# Patient Record
Sex: FEMALE | Race: WHITE | NOT HISPANIC OR LATINO | URBAN - METROPOLITAN AREA
[De-identification: names, ages, dates, MRNs, and addresses within clinical notes are randomized per-mention and may not be internally consistent; named-entity substitution may affect disease eponyms.]

---

## 2017-07-06 ENCOUNTER — APPOINTMENT (RX ONLY)
Dept: URBAN - METROPOLITAN AREA CLINIC 349 | Facility: CLINIC | Age: 77
Setting detail: DERMATOLOGY
End: 2017-07-06

## 2017-07-06 DIAGNOSIS — L81.4 OTHER MELANIN HYPERPIGMENTATION: ICD-10-CM

## 2017-07-06 DIAGNOSIS — D22 MELANOCYTIC NEVI: ICD-10-CM

## 2017-07-06 DIAGNOSIS — D18.0 HEMANGIOMA: ICD-10-CM

## 2017-07-06 PROBLEM — D18.01 HEMANGIOMA OF SKIN AND SUBCUTANEOUS TISSUE: Status: ACTIVE | Noted: 2017-07-06

## 2017-07-06 PROBLEM — E78.5 HYPERLIPIDEMIA, UNSPECIFIED: Status: ACTIVE | Noted: 2017-07-06

## 2017-07-06 PROBLEM — D22.5 MELANOCYTIC NEVI OF TRUNK: Status: ACTIVE | Noted: 2017-07-06

## 2017-07-06 PROCEDURE — ? COUNSELING

## 2017-07-06 PROCEDURE — ? RECOMMENDATIONS

## 2017-07-06 PROCEDURE — 99202 OFFICE O/P NEW SF 15 MIN: CPT

## 2017-07-06 ASSESSMENT — LOCATION ZONE DERM
LOCATION ZONE: TRUNK
LOCATION ZONE: NOSE

## 2017-07-06 ASSESSMENT — LOCATION DETAILED DESCRIPTION DERM
LOCATION DETAILED: UPPER STERNUM
LOCATION DETAILED: MIDDLE STERNUM
LOCATION DETAILED: LEFT MEDIAL SUPERIOR CHEST
LOCATION DETAILED: RIGHT SUPERIOR MEDIAL UPPER BACK
LOCATION DETAILED: RIGHT SUPERIOR UPPER BACK
LOCATION DETAILED: LEFT NASAL SIDEWALL

## 2017-07-06 ASSESSMENT — LOCATION SIMPLE DESCRIPTION DERM
LOCATION SIMPLE: CHEST
LOCATION SIMPLE: LEFT NOSE
LOCATION SIMPLE: RIGHT UPPER BACK

## 2017-07-06 NOTE — PROCEDURE: RECOMMENDATIONS
Recommendations (Free Text): Discussed bleaching cream, pt will consider this
Detail Level: Zone
Recommendations (Free Text): Sunscreen

## 2018-01-24 ENCOUNTER — APPOINTMENT (RX ONLY)
Dept: URBAN - METROPOLITAN AREA CLINIC 349 | Facility: CLINIC | Age: 78
Setting detail: DERMATOLOGY
End: 2018-01-24

## 2018-01-24 DIAGNOSIS — L81.4 OTHER MELANIN HYPERPIGMENTATION: ICD-10-CM

## 2018-01-24 PROBLEM — I10 ESSENTIAL (PRIMARY) HYPERTENSION: Status: ACTIVE | Noted: 2018-01-24

## 2018-01-24 PROCEDURE — ? COUNSELING

## 2018-01-24 PROCEDURE — 99213 OFFICE O/P EST LOW 20 MIN: CPT

## 2018-01-24 PROCEDURE — ? RECOMMENDATIONS

## 2018-01-24 ASSESSMENT — LOCATION SIMPLE DESCRIPTION DERM
LOCATION SIMPLE: RIGHT TEMPLE
LOCATION SIMPLE: LEFT CHEEK

## 2018-01-24 ASSESSMENT — LOCATION DETAILED DESCRIPTION DERM
LOCATION DETAILED: LEFT SUPERIOR LATERAL MALAR CHEEK
LOCATION DETAILED: LEFT INFERIOR NASAL CHEEK
LOCATION DETAILED: RIGHT INFERIOR TEMPLE

## 2018-01-24 ASSESSMENT — LOCATION ZONE DERM: LOCATION ZONE: FACE

## 2018-01-24 NOTE — PROCEDURE: RECOMMENDATIONS
Detail Level: Zone
Recommendations (Free Text): Stressed importance of cerave or aveeno sunscreen twice a day\\nTold pt to restart bleaching cream she purchased here last summer. \\nTold takes months to help lighten\\nDiscussed making an appointment with our  [place] for laser consult

## 2018-07-06 ENCOUNTER — APPOINTMENT (RX ONLY)
Dept: URBAN - METROPOLITAN AREA CLINIC 349 | Facility: CLINIC | Age: 78
Setting detail: DERMATOLOGY
End: 2018-07-06

## 2018-07-06 DIAGNOSIS — D18.0 HEMANGIOMA: ICD-10-CM

## 2018-07-06 DIAGNOSIS — L82.1 OTHER SEBORRHEIC KERATOSIS: ICD-10-CM

## 2018-07-06 DIAGNOSIS — L81.4 OTHER MELANIN HYPERPIGMENTATION: ICD-10-CM

## 2018-07-06 PROBLEM — D18.01 HEMANGIOMA OF SKIN AND SUBCUTANEOUS TISSUE: Status: ACTIVE | Noted: 2018-07-06

## 2018-07-06 PROCEDURE — 99213 OFFICE O/P EST LOW 20 MIN: CPT

## 2018-07-06 PROCEDURE — ? COUNSELING

## 2018-07-06 ASSESSMENT — LOCATION DETAILED DESCRIPTION DERM
LOCATION DETAILED: UPPER STERNUM
LOCATION DETAILED: RIGHT PROXIMAL DORSAL FOREARM
LOCATION DETAILED: LEFT CENTRAL MALAR CHEEK
LOCATION DETAILED: LEFT PROXIMAL DORSAL FOREARM
LOCATION DETAILED: LEFT SUPERIOR MEDIAL UPPER BACK

## 2018-07-06 ASSESSMENT — LOCATION SIMPLE DESCRIPTION DERM
LOCATION SIMPLE: LEFT UPPER BACK
LOCATION SIMPLE: LEFT FOREARM
LOCATION SIMPLE: CHEST
LOCATION SIMPLE: LEFT CHEEK
LOCATION SIMPLE: RIGHT FOREARM

## 2018-07-06 ASSESSMENT — LOCATION ZONE DERM
LOCATION ZONE: FACE
LOCATION ZONE: ARM
LOCATION ZONE: TRUNK

## 2018-09-26 PROBLEM — F32.1 MODERATE MAJOR DEPRESSION (HCC): Status: ACTIVE | Noted: 2018-09-26

## 2018-09-26 PROBLEM — F32.A DEPRESSION: Status: ACTIVE | Noted: 2018-09-26

## 2018-10-24 ENCOUNTER — HOSPITAL ENCOUNTER (OUTPATIENT)
Dept: MAMMOGRAPHY | Age: 78
Discharge: HOME | End: 2018-10-24
Payer: MEDICARE

## 2018-10-24 DIAGNOSIS — Z00.00 MEDICARE WELCOME EXAM: ICD-10-CM

## 2018-10-24 DIAGNOSIS — Z12.39 BREAST CANCER SCREENING: ICD-10-CM

## 2018-10-24 PROCEDURE — 77067 SCR MAMMO BI INCL CAD: CPT

## 2019-02-04 PROBLEM — I10 ESSENTIAL HYPERTENSION: Status: ACTIVE | Noted: 2019-02-04

## 2019-02-04 PROBLEM — I48.0 PAROXYSMAL ATRIAL FIBRILLATION (HCC): Status: ACTIVE | Noted: 2019-02-04

## 2019-03-01 PROBLEM — E78.2 MIXED HYPERLIPIDEMIA: Status: ACTIVE | Noted: 2019-03-01

## 2019-06-13 ENCOUNTER — HOSPITAL ENCOUNTER (OUTPATIENT)
Dept: LAB | Age: 79
Discharge: HOME | End: 2019-06-13
Attending: PSYCHIATRY & NEUROLOGY
Payer: MEDICARE

## 2019-06-13 DIAGNOSIS — F03.90 DEMENTIA WITHOUT BEHAVIORAL DISTURBANCE, UNSPECIFIED DEMENTIA TYPE: ICD-10-CM

## 2019-06-13 LAB
ALBUMIN SERPL-MCNC: 4.3 G/DL (ref 3.2–4.6)
ALBUMIN/GLOB SERPL: 1.5 {RATIO} (ref 1.2–3.5)
ALP SERPL-CCNC: 87 U/L (ref 50–136)
ALT SERPL-CCNC: 27 U/L (ref 12–65)
ANION GAP SERPL CALC-SCNC: 8 MMOL/L (ref 7–16)
AST SERPL-CCNC: 26 U/L (ref 15–37)
BILIRUB SERPL-MCNC: 0.5 MG/DL (ref 0.2–1.1)
BUN SERPL-MCNC: 12 MG/DL (ref 8–23)
CALCIUM SERPL-MCNC: 9.3 MG/DL (ref 8.3–10.4)
CHLORIDE SERPL-SCNC: 97 MMOL/L (ref 98–107)
CO2 SERPL-SCNC: 28 MMOL/L (ref 21–32)
CREAT SERPL-MCNC: 0.71 MG/DL (ref 0.6–1)
ERYTHROCYTE [DISTWIDTH] IN BLOOD BY AUTOMATED COUNT: 12.7 % (ref 11.9–14.6)
GLOBULIN SER CALC-MCNC: 2.9 G/DL (ref 2.3–3.5)
GLUCOSE SERPL-MCNC: 95 MG/DL (ref 65–100)
HCT VFR BLD AUTO: 43.2 % (ref 35.8–46.3)
HGB BLD-MCNC: 14.4 G/DL (ref 11.7–15.4)
MCH RBC QN AUTO: 31.3 PG (ref 26.1–32.9)
MCHC RBC AUTO-ENTMCNC: 33.3 G/DL (ref 31.4–35)
MCV RBC AUTO: 93.9 FL (ref 79.6–97.8)
NRBC # BLD: 0 K/UL (ref 0–0.2)
PLATELET # BLD AUTO: 289 K/UL (ref 150–450)
PMV BLD AUTO: 11.3 FL (ref 9.4–12.3)
POTASSIUM SERPL-SCNC: 4 MMOL/L (ref 3.5–5.1)
PROT SERPL-MCNC: 7.2 G/DL (ref 6.3–8.2)
RBC # BLD AUTO: 4.6 M/UL (ref 4.05–5.2)
SODIUM SERPL-SCNC: 133 MMOL/L (ref 136–145)
TSH SERPL DL<=0.005 MIU/L-ACNC: 2.24 UIU/ML (ref 0.36–3.74)
VIT B12 SERPL-MCNC: 280 PG/ML (ref 193–986)
WBC # BLD AUTO: 7.1 K/UL (ref 4.3–11.1)

## 2019-06-13 PROCEDURE — 84443 ASSAY THYROID STIM HORMONE: CPT

## 2019-06-13 PROCEDURE — 85027 COMPLETE CBC AUTOMATED: CPT

## 2019-06-13 PROCEDURE — 82607 VITAMIN B-12: CPT

## 2019-06-13 PROCEDURE — 80053 COMPREHEN METABOLIC PANEL: CPT

## 2019-06-13 PROCEDURE — 36415 COLL VENOUS BLD VENIPUNCTURE: CPT

## 2019-06-29 ENCOUNTER — HOSPITAL ENCOUNTER (OUTPATIENT)
Dept: MRI IMAGING | Age: 79
Discharge: HOME | End: 2019-06-29
Attending: PSYCHIATRY & NEUROLOGY
Payer: MEDICARE

## 2019-06-29 DIAGNOSIS — F03.90 DEMENTIA WITHOUT BEHAVIORAL DISTURBANCE, UNSPECIFIED DEMENTIA TYPE: ICD-10-CM

## 2019-06-29 PROCEDURE — 70551 MRI BRAIN STEM W/O DYE: CPT

## 2019-12-26 ENCOUNTER — HOSPITAL ENCOUNTER (OUTPATIENT)
Dept: LAB | Age: 79
Discharge: HOME | End: 2019-12-26
Payer: MEDICARE

## 2019-12-26 DIAGNOSIS — I10 ESSENTIAL HYPERTENSION: ICD-10-CM

## 2019-12-26 LAB
ANION GAP SERPL CALC-SCNC: 8 MMOL/L (ref 7–16)
BUN SERPL-MCNC: 17 MG/DL (ref 8–23)
CALCIUM SERPL-MCNC: 9.3 MG/DL (ref 8.3–10.4)
CHLORIDE SERPL-SCNC: 103 MMOL/L (ref 98–107)
CO2 SERPL-SCNC: 27 MMOL/L (ref 21–32)
CREAT SERPL-MCNC: 0.8 MG/DL (ref 0.6–1)
GLUCOSE SERPL-MCNC: 93 MG/DL (ref 65–100)
POTASSIUM SERPL-SCNC: 3.9 MMOL/L (ref 3.5–5.1)
SODIUM SERPL-SCNC: 138 MMOL/L (ref 136–145)

## 2019-12-26 PROCEDURE — 36415 COLL VENOUS BLD VENIPUNCTURE: CPT

## 2019-12-26 PROCEDURE — 80048 BASIC METABOLIC PNL TOTAL CA: CPT

## 2020-03-03 ENCOUNTER — HOSPITAL ENCOUNTER (EMERGENCY)
Age: 80
Discharge: HOME | End: 2020-03-03
Attending: EMERGENCY MEDICINE
Payer: MEDICARE

## 2020-03-03 VITALS
SYSTOLIC BLOOD PRESSURE: 133 MMHG | OXYGEN SATURATION: 96 % | BODY MASS INDEX: 19.73 KG/M2 | DIASTOLIC BLOOD PRESSURE: 62 MMHG | WEIGHT: 94 LBS | TEMPERATURE: 97.6 F | HEIGHT: 58 IN | HEART RATE: 64 BPM | RESPIRATION RATE: 34 BRPM

## 2020-03-03 DIAGNOSIS — R55 NEAR SYNCOPE: Primary | ICD-10-CM

## 2020-03-03 LAB
ALBUMIN SERPL-MCNC: 3.8 G/DL (ref 3.2–4.6)
ALBUMIN/GLOB SERPL: 1.2 {RATIO} (ref 1.2–3.5)
ALP SERPL-CCNC: 81 U/L (ref 50–136)
ALT SERPL-CCNC: 26 U/L (ref 12–65)
ANION GAP SERPL CALC-SCNC: 4 MMOL/L (ref 7–16)
AST SERPL-CCNC: 19 U/L (ref 15–37)
ATRIAL RATE: 65 BPM
BILIRUB SERPL-MCNC: 0.7 MG/DL (ref 0.2–1.1)
BUN SERPL-MCNC: 18 MG/DL (ref 8–23)
CALCIUM SERPL-MCNC: 9 MG/DL (ref 8.3–10.4)
CALCULATED P AXIS, ECG09: 86 DEGREES
CALCULATED R AXIS, ECG10: 58 DEGREES
CALCULATED T AXIS, ECG11: 51 DEGREES
CHLORIDE SERPL-SCNC: 104 MMOL/L (ref 98–107)
CO2 SERPL-SCNC: 29 MMOL/L (ref 21–32)
CREAT SERPL-MCNC: 0.78 MG/DL (ref 0.6–1)
DIAGNOSIS, 93000: NORMAL
ERYTHROCYTE [DISTWIDTH] IN BLOOD BY AUTOMATED COUNT: 12.3 % (ref 11.9–14.6)
GLOBULIN SER CALC-MCNC: 3.3 G/DL (ref 2.3–3.5)
GLUCOSE SERPL-MCNC: 105 MG/DL (ref 65–100)
HCT VFR BLD AUTO: 40.9 % (ref 35.8–46.3)
HGB BLD-MCNC: 13.9 G/DL (ref 11.7–15.4)
MCH RBC QN AUTO: 32.3 PG (ref 26.1–32.9)
MCHC RBC AUTO-ENTMCNC: 34 G/DL (ref 31.4–35)
MCV RBC AUTO: 94.9 FL (ref 79.6–97.8)
NRBC # BLD: 0 K/UL (ref 0–0.2)
P-R INTERVAL, ECG05: 162 MS
PLATELET # BLD AUTO: 277 K/UL (ref 150–450)
PMV BLD AUTO: 10.8 FL (ref 9.4–12.3)
POTASSIUM SERPL-SCNC: 4.1 MMOL/L (ref 3.5–5.1)
PROT SERPL-MCNC: 7.1 G/DL (ref 6.3–8.2)
Q-T INTERVAL, ECG07: 408 MS
QRS DURATION, ECG06: 60 MS
QTC CALCULATION (BEZET), ECG08: 424 MS
RBC # BLD AUTO: 4.31 M/UL (ref 4.05–5.2)
SODIUM SERPL-SCNC: 137 MMOL/L (ref 136–145)
TSH SERPL DL<=0.005 MIU/L-ACNC: 3.04 UIU/ML (ref 0.36–3.74)
VENTRICULAR RATE, ECG03: 65 BPM
WBC # BLD AUTO: 5.9 K/UL (ref 4.3–11.1)

## 2020-03-03 PROCEDURE — 99285 EMERGENCY DEPT VISIT HI MDM: CPT

## 2020-03-03 PROCEDURE — 85027 COMPLETE CBC AUTOMATED: CPT

## 2020-03-03 PROCEDURE — 80053 COMPREHEN METABOLIC PANEL: CPT

## 2020-03-03 PROCEDURE — 93005 ELECTROCARDIOGRAM TRACING: CPT | Performed by: EMERGENCY MEDICINE

## 2020-03-03 PROCEDURE — 84443 ASSAY THYROID STIM HORMONE: CPT

## 2020-03-03 NOTE — ED TRIAGE NOTES
Pt arrives via [facility] ems from home after syncopal episode this morning. Pt is on Xarelto. No pain after fall. Pt denies n/v/d.  Per ems negative for orthostatic hypotension. EMS VS: 146/76 hr 68. Pt is a/o x 4 at this time. Pt states she felt cold and diaphoretic prior to syncopal episode. Pt states  helped lower her to floor. Denies recent cp. Denies urinary sx.

## 2020-03-03 NOTE — ED PROVIDER NOTES
Patient is a 77-year-old female with a history of hypertension and paroxysmal A. fib who comes to the emergency department today after an episode of near syncope. She got up from bed was ambulating to the kitchen to make some coffee and states she got sweaty and felt lightheaded.  lowered her to the ground she never had loss of consciousness. There was no focal numbness or weakness. No chest pain. No palpitations. Feels better now. She does state that she is chronically cold her daughter is worried about a possible thyroid. The history is provided by the patient. Syncope    This is a new problem. The current episode started 1 to 2 hours ago. The problem has been resolved. There was no loss of consciousness. The problem is associated with normal activity. Associated symptoms include malaise/fatigue and light-headedness. Pertinent negatives include no visual change, no chest pain, no palpitations, no fever, no abdominal pain, no bowel incontinence, no nausea, no bladder incontinence, no congestion, no headaches, no back pain, no seizures, no weakness, no anal bleeding and no head injury. She has tried nothing for the symptoms. Her past medical history is significant for HTN. Her past medical history does not include no CVA, no TIA, no CAD or no syncope.         Past Medical History:   Diagnosis Date    Atrial fibrillation ([address]     Dementia ([address]     Depression     Hypercholesterolemia     Hypertension     IBS (irritable bowel syndrome)        Past Surgical History:   Procedure Laterality Date    BREAST SURGERY PROCEDURE UNLISTED  2013    lumpectomy    BREAST SURGERY PROCEDURE UNLISTED      bilat implant    HX BREAST BIOPSY Left 2014    [place]COPY  2012    in [place] (repeat in 10 years)    HX COLONOSCOPY  06/2018     no polyps    HX ENDOSCOPY  06/25/2018    HX HERNIA REPAIR  2012    inguinal    HX ORTHOPAEDIC Left     bunion    HX OTHER SURGICAL      face lift    IMPLANT BREAST SILICONE/EQ Bilateral     35 yrs ago         Family History:   Problem Relation Age of Onset    Hypertension Mother     Hypertension Father     Cancer Sister 78        pancreatic    Dementia Sister        Social History     Socioeconomic History    Marital status:      Spouse name: Not on file    Number of children: Not on file    Years of education: Not on file    Highest education level: Not on file   Occupational History    Not on file   Social Needs    Financial resource strain: Not on file    Food insecurity:     Worry: Not on file     Inability: Not on file    Transportation needs:     Medical: Not on file     Non-medical: Not on file   Tobacco Use    Smoking status: Never Smoker    Smokeless tobacco: Never Used   Substance and Sexual Activity    Alcohol use: Yes     Comment: occasional    Drug use: Never    Sexual activity: Not on file   Lifestyle    Physical activity:     Days per week: Not on file     Minutes per session: Not on file    Stress: Not on file   Relationships    Social connections:     Talks on phone: Not on file     Gets together: Not on file     Attends Roman Catholic service: Not on file     Active member of club or organization: Not on file     Attends meetings of clubs or organizations: Not on file     Relationship status: Not on file    Intimate partner violence:     Fear of current or ex partner: Not on file     Emotionally abused: Not on file     Physically abused: Not on file     Forced sexual activity: Not on file   Other Topics Concern    Not on file   Social History Narrative    Not on file         ALLERGIES: Patient has no known allergies. Review of Systems   Constitutional: Positive for malaise/fatigue. Negative for chills, fatigue and fever. HENT: Negative for congestion, rhinorrhea and sore throat. Eyes: Negative for pain, discharge and visual disturbance. Respiratory: Negative for cough and shortness of breath.     Cardiovascular: Positive for syncope. Negative for chest pain and palpitations. Gastrointestinal: Negative for abdominal pain, anal bleeding, bowel incontinence, diarrhea and nausea. Endocrine: Negative for polydipsia and polyuria. Genitourinary: Negative for bladder incontinence, dysuria, frequency and urgency. Musculoskeletal: Negative for back pain and neck pain. Skin: Negative for rash. Neurological: Positive for light-headedness. Negative for seizures, syncope, facial asymmetry, weakness, numbness and headaches. Hematological: Negative. Vitals:    03/03/20 0907 03/03/20 0913 03/03/20 0933 03/03/20 0952   BP:  120/58 130/65 129/62   Pulse:    64   Resp:    (!) 34   Temp:       SpO2: 100% 98% 97% 97%   Weight:       Height:                Physical Exam  Vitals signs and nursing note reviewed. Constitutional:       Appearance: She is well-developed. HENT:      Head: Normocephalic and atraumatic. Eyes:      Conjunctiva/sclera: Conjunctivae normal.      Pupils: Pupils are equal, round, and reactive to light. Neck:      Musculoskeletal: Normal range of motion and neck supple. Cardiovascular:      Rate and Rhythm: Normal rate and regular rhythm. Pulmonary:      Effort: Pulmonary effort is normal.      Breath sounds: Normal breath sounds. Abdominal:      Palpations: Abdomen is soft. Tenderness: There is no abdominal tenderness. There is no guarding or rebound. Musculoskeletal: Normal range of motion. General: No deformity. Lymphadenopathy:      Cervical: No cervical adenopathy. Skin:     General: Skin is warm and dry. Capillary Refill: Capillary refill takes less than 2 seconds. Findings: No rash. Neurological:      Mental Status: She is alert and oriented to person, place, and time. GCS: GCS eye subscore is 4. GCS verbal subscore is 5. GCS motor subscore is 6. Cranial Nerves: No cranial nerve deficit. Sensory: No sensory deficit. Motor: No weakness. MDM  Number of Diagnoses or Management Options  Diagnosis management comments: EKG reviewed by me shows normal sinus rhythm rate of 65 no acute ischemia  Laboratory evaluation unremarkable  Orthostatic vital signs are negative    I discussed all these with results with patient and multiple family members. She had no neurologic symptoms and no loss of consciousness or trauma so I see no indication for CAT scan of the head. They were reassured daughter does want me to add on a TSH which I will do and she will follow-up with her primary care physician to get the results. Voice dictation software was used during the making of this note. This software is not perfect and grammatical and other typographical errors may be present. This note has been proofread, but may still contain errors.   Reagan Christianson MD; 3/3/2020 @10:44 AM   ===================================================================         Amount and/or Complexity of Data Reviewed  Clinical lab tests: ordered and reviewed  Obtain history from someone other than the patient: yes  Review and summarize past medical records: yes  Independent visualization of images, tracings, or specimens: yes    Risk of Complications, Morbidity, and/or Mortality  Presenting problems: low  Diagnostic procedures: low  Management options: low    Patient Progress  Patient progress: stable         Procedures

## 2020-03-03 NOTE — ED NOTES
I have reviewed discharge instructions with the patient. The patient verbalized understanding. Patient left ED via Discharge Method: ambulatory to Home with family. Opportunity for questions and clarification provided. Patient given 0 scripts. To continue your aftercare when you leave the hospital, you may receive an automated call from our care team to check in on how you are doing. This is a free service and part of our promise to provide the best care and service to meet your aftercare needs.  If you have questions, or wish to unsubscribe from this service please call [phone]. Thank you for Choosing our [facility] Emergency Department.

## 2020-06-19 PROBLEM — R42 DIZZINESS: Status: ACTIVE | Noted: 2020-06-19

## 2020-09-25 ENCOUNTER — HOSPITAL ENCOUNTER (OUTPATIENT)
Dept: LAB | Age: 80
Discharge: HOME | End: 2020-09-25
Payer: MEDICARE

## 2020-09-25 DIAGNOSIS — R53.1 WEAKNESS GENERALIZED: ICD-10-CM

## 2020-09-25 DIAGNOSIS — I10 ESSENTIAL HYPERTENSION: ICD-10-CM

## 2020-09-25 DIAGNOSIS — I48.0 PAROXYSMAL ATRIAL FIBRILLATION (HCC): ICD-10-CM

## 2020-09-25 DIAGNOSIS — E78.2 MIXED HYPERLIPIDEMIA: ICD-10-CM

## 2020-09-25 LAB
ALBUMIN SERPL-MCNC: 4.1 G/DL (ref 3.2–4.6)
ALBUMIN/GLOB SERPL: 1.2 {RATIO} (ref 1.2–3.5)
ALP SERPL-CCNC: 86 U/L (ref 50–136)
ALT SERPL-CCNC: 23 U/L (ref 12–65)
ANION GAP SERPL CALC-SCNC: 5 MMOL/L (ref 7–16)
AST SERPL-CCNC: 17 U/L (ref 15–37)
BASOPHILS # BLD: 0 K/UL (ref 0–0.2)
BASOPHILS NFR BLD: 1 % (ref 0–2)
BILIRUB SERPL-MCNC: 0.9 MG/DL (ref 0.2–1.1)
BUN SERPL-MCNC: 16 MG/DL (ref 8–23)
CALCIUM SERPL-MCNC: 9.5 MG/DL (ref 8.3–10.4)
CHLORIDE SERPL-SCNC: 98 MMOL/L (ref 98–107)
CHOLEST SERPL-MCNC: 164 MG/DL
CO2 SERPL-SCNC: 29 MMOL/L (ref 21–32)
CREAT SERPL-MCNC: 0.72 MG/DL (ref 0.6–1)
DIFFERENTIAL METHOD BLD: NORMAL
EOSINOPHIL # BLD: 0.1 K/UL (ref 0–0.8)
EOSINOPHIL NFR BLD: 1 % (ref 0.5–7.8)
ERYTHROCYTE [DISTWIDTH] IN BLOOD BY AUTOMATED COUNT: 12.9 % (ref 11.9–14.6)
GLOBULIN SER CALC-MCNC: 3.3 G/DL (ref 2.3–3.5)
GLUCOSE SERPL-MCNC: 103 MG/DL (ref 65–100)
HCT VFR BLD AUTO: 40 % (ref 35.8–46.3)
HDLC SERPL-MCNC: 84 MG/DL (ref 40–60)
HDLC SERPL: 2 {RATIO}
HGB BLD-MCNC: 13.8 G/DL (ref 11.7–15.4)
IMM GRANULOCYTES # BLD AUTO: 0 K/UL (ref 0–0.5)
IMM GRANULOCYTES NFR BLD AUTO: 0 % (ref 0–5)
LDLC SERPL CALC-MCNC: 64.6 MG/DL
LIPID PROFILE,FLP: ABNORMAL
LYMPHOCYTES # BLD: 2.3 K/UL (ref 0.5–4.6)
LYMPHOCYTES NFR BLD: 32 % (ref 13–44)
MCH RBC QN AUTO: 32.2 PG (ref 26.1–32.9)
MCHC RBC AUTO-ENTMCNC: 34.5 G/DL (ref 31.4–35)
MCV RBC AUTO: 93.2 FL (ref 79.6–97.8)
MONOCYTES # BLD: 0.6 K/UL (ref 0.1–1.3)
MONOCYTES NFR BLD: 8 % (ref 4–12)
NEUTS SEG # BLD: 4.3 K/UL (ref 1.7–8.2)
NEUTS SEG NFR BLD: 59 % (ref 43–78)
NRBC # BLD: 0 K/UL (ref 0–0.2)
PLATELET # BLD AUTO: 303 K/UL (ref 150–450)
PMV BLD AUTO: 10.9 FL (ref 9.4–12.3)
POTASSIUM SERPL-SCNC: 3.3 MMOL/L (ref 3.5–5.1)
PROT SERPL-MCNC: 7.4 G/DL (ref 6.3–8.2)
RBC # BLD AUTO: 4.29 M/UL (ref 4.05–5.2)
SODIUM SERPL-SCNC: 132 MMOL/L (ref 136–145)
T4 FREE SERPL-MCNC: 1.3 NG/DL (ref 0.78–1.46)
TRIGL SERPL-MCNC: 77 MG/DL (ref 35–150)
TSH SERPL DL<=0.005 MIU/L-ACNC: 2.56 UIU/ML (ref 0.36–3.74)
VLDLC SERPL CALC-MCNC: 15.4 MG/DL (ref 6–23)
WBC # BLD AUTO: 7.3 K/UL (ref 4.3–11.1)

## 2020-09-25 PROCEDURE — 84443 ASSAY THYROID STIM HORMONE: CPT

## 2020-09-25 PROCEDURE — 80053 COMPREHEN METABOLIC PANEL: CPT

## 2020-09-25 PROCEDURE — 36415 COLL VENOUS BLD VENIPUNCTURE: CPT

## 2020-09-25 PROCEDURE — 85025 COMPLETE CBC W/AUTO DIFF WBC: CPT

## 2020-09-25 PROCEDURE — 84439 ASSAY OF FREE THYROXINE: CPT

## 2020-09-25 PROCEDURE — 80061 LIPID PANEL: CPT

## 2020-09-28 NOTE — PROGRESS NOTES
Please call patient. LAbs show low potassium and sodium. Likely due to HCTZ. Stop Lisinopril/HCTZ and replace with Lisinopril 10 mg a day. REcheck BMP in 2weeks.    Thank you

## 2020-10-07 ENCOUNTER — HOSPITAL ENCOUNTER (OUTPATIENT)
Dept: LAB | Age: 80
Discharge: HOME | End: 2020-10-07
Payer: MEDICARE

## 2020-10-07 DIAGNOSIS — E87.6 HYPOKALEMIA: ICD-10-CM

## 2020-10-07 LAB
ANION GAP SERPL CALC-SCNC: 6 MMOL/L (ref 7–16)
BUN SERPL-MCNC: 15 MG/DL (ref 8–23)
CALCIUM SERPL-MCNC: 9.6 MG/DL (ref 8.3–10.4)
CHLORIDE SERPL-SCNC: 103 MMOL/L (ref 98–107)
CO2 SERPL-SCNC: 28 MMOL/L (ref 21–32)
CREAT SERPL-MCNC: 0.79 MG/DL (ref 0.6–1)
GLUCOSE SERPL-MCNC: 94 MG/DL (ref 65–100)
POTASSIUM SERPL-SCNC: 3.8 MMOL/L (ref 3.5–5.1)
SODIUM SERPL-SCNC: 137 MMOL/L (ref 136–145)

## 2020-10-07 PROCEDURE — 80048 BASIC METABOLIC PNL TOTAL CA: CPT

## 2020-10-07 PROCEDURE — 36415 COLL VENOUS BLD VENIPUNCTURE: CPT

## 2021-05-20 ENCOUNTER — HOSPITAL ENCOUNTER (OUTPATIENT)
Dept: LAB | Age: 81
Discharge: HOME | End: 2021-05-20
Payer: MEDICARE

## 2021-05-20 DIAGNOSIS — I48.0 PAROXYSMAL ATRIAL FIBRILLATION (HCC): ICD-10-CM

## 2021-05-20 DIAGNOSIS — E78.2 MIXED HYPERLIPIDEMIA: ICD-10-CM

## 2021-05-20 LAB
ALBUMIN SERPL-MCNC: 4.2 G/DL (ref 3.2–4.6)
ALBUMIN/GLOB SERPL: 1.4 {RATIO} (ref 1.2–3.5)
ALP SERPL-CCNC: 70 U/L (ref 50–136)
ALT SERPL-CCNC: 30 U/L (ref 12–65)
ANION GAP SERPL CALC-SCNC: 3 MMOL/L (ref 7–16)
AST SERPL-CCNC: 25 U/L (ref 15–37)
BASOPHILS # BLD: 0 K/UL (ref 0–0.2)
BASOPHILS NFR BLD: 1 % (ref 0–2)
BILIRUB SERPL-MCNC: 0.8 MG/DL (ref 0.2–1.1)
BUN SERPL-MCNC: 10 MG/DL (ref 8–23)
CALCIUM SERPL-MCNC: 9.4 MG/DL (ref 8.3–10.4)
CHLORIDE SERPL-SCNC: 104 MMOL/L (ref 98–107)
CHOLEST SERPL-MCNC: 175 MG/DL
CO2 SERPL-SCNC: 28 MMOL/L (ref 21–32)
CREAT SERPL-MCNC: 0.65 MG/DL (ref 0.6–1)
DIFFERENTIAL METHOD BLD: ABNORMAL
EOSINOPHIL # BLD: 0.1 K/UL (ref 0–0.8)
EOSINOPHIL NFR BLD: 1 % (ref 0.5–7.8)
ERYTHROCYTE [DISTWIDTH] IN BLOOD BY AUTOMATED COUNT: 13 % (ref 11.9–14.6)
GLOBULIN SER CALC-MCNC: 2.9 G/DL (ref 2.3–3.5)
GLUCOSE SERPL-MCNC: 97 MG/DL (ref 65–100)
HCT VFR BLD AUTO: 40.4 % (ref 35.8–46.3)
HDLC SERPL-MCNC: 81 MG/DL (ref 40–60)
HDLC SERPL: 2.2 {RATIO}
HGB BLD-MCNC: 13.4 G/DL (ref 11.7–15.4)
IMM GRANULOCYTES # BLD AUTO: 0 K/UL (ref 0–0.5)
IMM GRANULOCYTES NFR BLD AUTO: 0 % (ref 0–5)
LDLC SERPL CALC-MCNC: 79.8 MG/DL
LYMPHOCYTES # BLD: 1.9 K/UL (ref 0.5–4.6)
LYMPHOCYTES NFR BLD: 27 % (ref 13–44)
MCH RBC QN AUTO: 32.6 PG (ref 26.1–32.9)
MCHC RBC AUTO-ENTMCNC: 33.2 G/DL (ref 31.4–35)
MCV RBC AUTO: 98.3 FL (ref 79.6–97.8)
MONOCYTES # BLD: 0.6 K/UL (ref 0.1–1.3)
MONOCYTES NFR BLD: 8 % (ref 4–12)
NEUTS SEG # BLD: 4.4 K/UL (ref 1.7–8.2)
NEUTS SEG NFR BLD: 63 % (ref 43–78)
NRBC # BLD: 0 K/UL (ref 0–0.2)
PLATELET # BLD AUTO: 244 K/UL (ref 150–450)
PMV BLD AUTO: 11.5 FL (ref 9.4–12.3)
POTASSIUM SERPL-SCNC: 4 MMOL/L (ref 3.5–5.1)
PROT SERPL-MCNC: 7.1 G/DL (ref 6.3–8.2)
RBC # BLD AUTO: 4.11 M/UL (ref 4.05–5.2)
SODIUM SERPL-SCNC: 135 MMOL/L (ref 136–145)
TRIGL SERPL-MCNC: 71 MG/DL (ref 35–150)
VLDLC SERPL CALC-MCNC: 14.2 MG/DL (ref 6–23)
WBC # BLD AUTO: 7 K/UL (ref 4.3–11.1)

## 2021-05-20 PROCEDURE — 85025 COMPLETE CBC W/AUTO DIFF WBC: CPT

## 2021-05-20 PROCEDURE — 80061 LIPID PANEL: CPT

## 2021-05-20 PROCEDURE — 80053 COMPREHEN METABOLIC PANEL: CPT

## 2021-05-20 PROCEDURE — 36415 COLL VENOUS BLD VENIPUNCTURE: CPT

## 2021-06-08 ENCOUNTER — APPOINTMENT (OUTPATIENT)
Dept: CT IMAGING | Age: 81
End: 2021-06-08
Attending: EMERGENCY MEDICINE
Payer: MEDICARE

## 2021-06-08 ENCOUNTER — HOSPITAL ENCOUNTER (EMERGENCY)
Age: 81
Discharge: HOME | End: 2021-06-09
Attending: EMERGENCY MEDICINE
Payer: MEDICARE

## 2021-06-08 ENCOUNTER — APPOINTMENT (OUTPATIENT)
Dept: GENERAL RADIOLOGY | Age: 81
End: 2021-06-08
Attending: EMERGENCY MEDICINE
Payer: MEDICARE

## 2021-06-08 DIAGNOSIS — R41.82 ALTERED MENTAL STATUS, UNSPECIFIED ALTERED MENTAL STATUS TYPE: Primary | ICD-10-CM

## 2021-06-08 DIAGNOSIS — F03.90 DEMENTIA WITHOUT BEHAVIORAL DISTURBANCE, UNSPECIFIED DEMENTIA TYPE: ICD-10-CM

## 2021-06-08 LAB
BASOPHILS # BLD: 0 K/UL (ref 0–0.2)
BASOPHILS NFR BLD: 0 % (ref 0–2)
DIFFERENTIAL METHOD BLD: ABNORMAL
EOSINOPHIL # BLD: 0.1 K/UL (ref 0–0.8)
EOSINOPHIL NFR BLD: 1 % (ref 0.5–7.8)
ERYTHROCYTE [DISTWIDTH] IN BLOOD BY AUTOMATED COUNT: 12.8 % (ref 11.9–14.6)
HCT VFR BLD AUTO: 35.2 % (ref 35.8–46.3)
HGB BLD-MCNC: 12.1 G/DL (ref 11.7–15.4)
IMM GRANULOCYTES # BLD AUTO: 0 K/UL (ref 0–0.5)
IMM GRANULOCYTES NFR BLD AUTO: 0 % (ref 0–5)
LYMPHOCYTES # BLD: 2.5 K/UL (ref 0.5–4.6)
LYMPHOCYTES NFR BLD: 35 % (ref 13–44)
MCH RBC QN AUTO: 32.9 PG (ref 26.1–32.9)
MCHC RBC AUTO-ENTMCNC: 34.4 G/DL (ref 31.4–35)
MCV RBC AUTO: 95.7 FL (ref 79.6–97.8)
MONOCYTES # BLD: 0.6 K/UL (ref 0.1–1.3)
MONOCYTES NFR BLD: 8 % (ref 4–12)
NEUTS SEG # BLD: 3.9 K/UL (ref 1.7–8.2)
NEUTS SEG NFR BLD: 55 % (ref 43–78)
NRBC # BLD: 0 K/UL (ref 0–0.2)
PLATELET # BLD AUTO: 219 K/UL (ref 150–450)
PMV BLD AUTO: 10.9 FL (ref 9.4–12.3)
RBC # BLD AUTO: 3.68 M/UL (ref 4.05–5.2)
WBC # BLD AUTO: 7.1 K/UL (ref 4.3–11.1)

## 2021-06-08 PROCEDURE — 82077 ASSAY SPEC XCP UR&BREATH IA: CPT

## 2021-06-08 PROCEDURE — 71045 X-RAY EXAM CHEST 1 VIEW: CPT

## 2021-06-08 PROCEDURE — 80053 COMPREHEN METABOLIC PANEL: CPT

## 2021-06-08 PROCEDURE — 99285 EMERGENCY DEPT VISIT HI MDM: CPT

## 2021-06-08 PROCEDURE — 93005 ELECTROCARDIOGRAM TRACING: CPT | Performed by: EMERGENCY MEDICINE

## 2021-06-08 PROCEDURE — 85025 COMPLETE CBC W/AUTO DIFF WBC: CPT

## 2021-06-08 PROCEDURE — 70450 CT HEAD/BRAIN W/O DYE: CPT

## 2021-06-09 VITALS
SYSTOLIC BLOOD PRESSURE: 142 MMHG | OXYGEN SATURATION: 98 % | WEIGHT: 98 LBS | BODY MASS INDEX: 20.57 KG/M2 | HEIGHT: 58 IN | DIASTOLIC BLOOD PRESSURE: 65 MMHG | TEMPERATURE: 97.9 F | HEART RATE: 65 BPM | RESPIRATION RATE: 18 BRPM

## 2021-06-09 LAB
ALBUMIN SERPL-MCNC: 3.5 G/DL (ref 3.2–4.6)
ALBUMIN/GLOB SERPL: 1.3 {RATIO} (ref 1.2–3.5)
ALP SERPL-CCNC: 66 U/L (ref 50–136)
ALT SERPL-CCNC: 26 U/L (ref 12–65)
ANION GAP SERPL CALC-SCNC: 7 MMOL/L (ref 7–16)
AST SERPL-CCNC: 16 U/L (ref 15–37)
ATRIAL RATE: 58 BPM
BILIRUB SERPL-MCNC: 0.5 MG/DL (ref 0.2–1.1)
BUN SERPL-MCNC: 12 MG/DL (ref 8–23)
CALCIUM SERPL-MCNC: 8.6 MG/DL (ref 8.3–10.4)
CALCULATED P AXIS, ECG09: 69 DEGREES
CALCULATED R AXIS, ECG10: 15 DEGREES
CALCULATED T AXIS, ECG11: 28 DEGREES
CHLORIDE SERPL-SCNC: 106 MMOL/L (ref 98–107)
CO2 SERPL-SCNC: 27 MMOL/L (ref 21–32)
CREAT SERPL-MCNC: 0.48 MG/DL (ref 0.6–1)
DIAGNOSIS, 93000: NORMAL
ETHANOL SERPL-MCNC: <3 MG/DL
GLOBULIN SER CALC-MCNC: 2.8 G/DL (ref 2.3–3.5)
GLUCOSE SERPL-MCNC: 85 MG/DL (ref 65–100)
P-R INTERVAL, ECG05: 178 MS
POTASSIUM SERPL-SCNC: 3.9 MMOL/L (ref 3.5–5.1)
PROT SERPL-MCNC: 6.3 G/DL (ref 6.3–8.2)
Q-T INTERVAL, ECG07: 426 MS
QRS DURATION, ECG06: 58 MS
QTC CALCULATION (BEZET), ECG08: 418 MS
SODIUM SERPL-SCNC: 140 MMOL/L (ref 136–145)
VENTRICULAR RATE, ECG03: 58 BPM

## 2021-06-09 NOTE — ED NOTES
I have reviewed discharge instructions with the caregiver. The caregiver verbalized understanding. Patient left ED via Discharge Method: wheelchair to Home with family    Opportunity for questions and clarification provided. Patient given 0 scripts. To continue your aftercare when you leave the hospital, you may receive an automated call from our care team to check in on how you are doing. This is a free service and part of our promise to provide the best care and service to meet your aftercare needs.  If you have questions, or wish to unsubscribe from this service please call [phone]. Thank you for Choosing our [facility] Emergency Department.

## 2021-06-09 NOTE — ED PROVIDER NOTES
Patient is a history atrial fibrillation, hypertension, hyperlipidemia and dementia who lives at home with her elderly . She presents to the ER with her daughter who lives nearby. The daughter states that over the past couple weeks the patient has been getting more confused. She has a baseline dementia but it has been worsening. Tonight around 9:30 PM, the daughter states the patient was sitting on a couch and slid off the couch onto the floor and became unresponsive. Her  was the only other went home, he called the daughter to come over. She found her unresponsive. The  stated that there was a Lunesta pill that was out that the  takes and he could not find it. He thinks it is possible that the patient might of taken Lunesta. The patient is unable to provide a meaningful history. The patient is now alert though confused per the daughter.            Past Medical History:   Diagnosis Date    Atrial fibrillation ([address]     Dementia ([address]     Depression     Hypercholesterolemia     Hypertension     IBS (irritable bowel syndrome)        Past Surgical History:   Procedure Laterality Date    HX BREAST BIOPSY Left 2014    [place]OSCOPY  2012    in [place] (repeat in 10 years)    HX COLONOSCOPY  06/2018     no polyps    HX ENDOSCOPY  06/25/2018    HX HERNIA REPAIR  2012    inguinal    HX ORTHOPAEDIC Left     bunion    HX OTHER SURGICAL      face lift    IMPLANT BREAST SILICONE/EQ Bilateral     35 yrs ago    CT BREAST SURGERY PROCEDURE UNLISTED  2013    lumpectomy    CT BREAST SURGERY PROCEDURE UNLISTED      bilat implant         Family History:   Problem Relation Age of Onset    Hypertension Mother     Hypertension Father     Cancer Sister 78        pancreatic    Dementia Sister        Social History     Socioeconomic History    Marital status:      Spouse name: Not on file    Number of children: Not on file    Years of education: Not on file   Coco [employer] education level: Not on file   Occupational History    Not on file   Tobacco Use    Smoking status: Former Smoker    Smokeless tobacco: Never Used   Substance and Sexual Activity    Alcohol use: Yes     Comment: occasional    Drug use: Never    Sexual activity: Not on file   Other Topics Concern    Not on file   Social History Narrative    Not on file     Social Determinants of Health     Financial Resource Strain:     Difficulty of Paying Living Expenses:    Food Insecurity:     Worried About Running Out of Food in the Last Year:     [address] in the Last Year:    Transportation Needs:     Lack of Transportation (Medical):  Lack of Transportation (Non-Medical):    Physical Activity:     Days of Exercise per Week:     Minutes of Exercise per Session:    Stress:     Feeling of Stress :    Social Connections:     Frequency of Communication with Friends and Family:     Frequency of Social Gatherings with Friends and Family:     Attends Orthodoxy Services:     Active Member of Clubs or Organizations:     Attends Club or Organization Meetings:     Marital Status:    Intimate Partner Violence:     Fear of Current or Ex-Partner:     Emotionally Abused:     Physically Abused:     Sexually Abused: ALLERGIES: Patient has no known allergies. Review of Systems   Unable to perform ROS: Dementia   Constitutional: Negative for chills and fever. Gastrointestinal: Negative for nausea and vomiting. Vitals:    06/08/21 2231   BP: (!) 142/67   Pulse: (!) 59   Resp: 16   SpO2: 97%   Weight: 44.5 kg (98 lb)   Height: 4' 10\" (1.473 m)            Physical Exam  Vitals and nursing note reviewed. Constitutional:       Appearance: She is well-developed and normal weight. HENT:      Head: Normocephalic and atraumatic. Eyes:      General:         Right eye: No discharge. Left eye: No discharge.       Conjunctiva/sclera: Conjunctivae normal.      Pupils: Pupils are equal, round, and reactive to light. Pulmonary:      Effort: Pulmonary effort is normal. No respiratory distress. Breath sounds: No wheezing or rales. Abdominal:      General: There is no distension. Palpations: Abdomen is soft. Tenderness: There is no abdominal tenderness. There is no guarding. Musculoskeletal:         General: No tenderness. Cervical back: Normal range of motion and neck supple. Right lower leg: No edema. Left lower leg: No edema. Skin:     General: Skin is warm and dry. Neurological:      Mental Status: She is alert and oriented to person, place, and time. Cranial Nerves: No cranial nerve deficit or facial asymmetry. Motor: No weakness. Psychiatric:      Comments: Oriented to person only          MDM  Number of Diagnoses or Management Options  Altered mental status, unspecified altered mental status type: new and requires workup  Dementia without behavioral disturbance, unspecified dementia type [address] [address] [facility]  Diagnosis management comments: 1:45 AM discussed results with daughter, unremarkable work-up. The  is now present, states he is \"99% sure she took my [place] tonight\". He states that he had a sitting next to a glass of water. He found the glass empty in the kitchen and the pill was gone. The certainly could contribute to her present complaints. Discussed possibility of keeping her in the hospital, inherent risks involved. They are comfortable taking her home. The daughter states she has an appointment with Dr. Coral Boyd next week.        Amount and/or Complexity of Data Reviewed  Clinical lab tests: ordered and reviewed  Tests in the radiology section of CPT®: ordered and reviewed  Tests in the medicine section of CPT®: reviewed and ordered  Obtain history from someone other than the patient: yes    Risk of Complications, Morbidity, and/or Mortality  Presenting problems: moderate  Diagnostic procedures: moderate  Management options: moderate    Patient Progress  Patient progress: stable         EKG    Date/Time: 6/8/2021 11:40 PM  Performed by: Jen Lacy MD  Authorized by: Jen Lacy MD     ECG reviewed by ED Physician in the absence of a cardiologist: yes    Previous ECG:     Previous ECG:  Unavailable  Interpretation:     Interpretation: normal    Rate:     ECG rate:  58    ECG rate assessment: bradycardic    Rhythm:     Rhythm: sinus bradycardia    Ectopy:     Ectopy: none    QRS:     QRS axis:  Normal    QRS intervals:  Normal  Conduction:     Conduction: normal    ST segments:     ST segments:  Normal  T waves:     T waves: normal

## 2021-06-09 NOTE — ED TRIAGE NOTES
Patient arrives via [facility] from home. Patient daughter called out for altered mental status post fall.  found patient on floor next to the couch, patient is on Xarelto. Family states 's Noble Lowry was missing off the counter and they are not sure what happened to it. Patient has hx of dementia at baseline. 18G LAC.

## 2022-03-18 PROBLEM — F32.A DEPRESSION: Status: ACTIVE | Noted: 2018-09-26

## 2022-03-18 PROBLEM — I10 ESSENTIAL HYPERTENSION: Status: ACTIVE | Noted: 2019-02-04

## 2022-03-19 PROBLEM — F32.1 MODERATE MAJOR DEPRESSION (HCC): Status: ACTIVE | Noted: 2018-09-26

## 2022-03-19 PROBLEM — I48.0 PAROXYSMAL ATRIAL FIBRILLATION (HCC): Status: ACTIVE | Noted: 2019-02-04

## 2022-03-19 PROBLEM — E78.2 MIXED HYPERLIPIDEMIA: Status: ACTIVE | Noted: 2019-03-01

## 2022-03-19 PROBLEM — R42 DIZZINESS: Status: ACTIVE | Noted: 2020-06-19

## 2022-06-04 ENCOUNTER — TELEPHONE ENCOUNTER (OUTPATIENT)
Dept: URBAN - METROPOLITAN AREA CLINIC 68 | Facility: CLINIC | Age: 82
End: 2022-06-04

## 2022-06-05 ENCOUNTER — TELEPHONE ENCOUNTER (OUTPATIENT)
Dept: URBAN - METROPOLITAN AREA CLINIC 68 | Facility: CLINIC | Age: 82
End: 2022-06-05

## 2022-06-05 RX ORDER — IBANDRONATE SODIUM 150 MG
BONIVA( 150MG ORAL  EVERY MONTH ) ACTIVE -HX ENTRY TABLET ORAL
Status: ACTIVE | COMMUNITY
Start: 2013-05-13

## 2022-06-05 RX ORDER — CELECOXIB 200 MG/1
CELEBREX( 200MG ORAL 100 MG PRN ) ACTIVE -HX ENTRY CAPSULE ORAL PRN
Status: ACTIVE | COMMUNITY
Start: 2013-05-13

## 2022-06-05 RX ORDER — VERAPAMIL HYDROCHLORIDE 360 MG/1
VERAPAMIL HCL ER( 360MG ORAL  DAILY ) ACTIVE -HX ENTRY CAPSULE, DELAYED RELEASE PELLETS ORAL DAILY
Status: ACTIVE | COMMUNITY
Start: 2013-05-13

## 2022-06-05 RX ORDER — HYDROCHLOROTHIAZIDE 25 MG/1
HYDROCHLOROTHIAZIDE( 25MG ORAL  DAILY ) ACTIVE -HX ENTRY TABLET ORAL DAILY
Status: ACTIVE | COMMUNITY
Start: 2013-05-13

## 2022-06-05 RX ORDER — POLYETHYLENE GLYCOL 3350, SODIUM SULFATE, SODIUM CHLORIDE, POTASSIUM CHLORIDE, ASCORBIC ACID, SODIUM ASCORBATE 7.5-2.691G
KIT ORAL
Qty: 1 | Refills: 0 | Status: ACTIVE | COMMUNITY
Start: 2013-05-13

## 2022-06-25 ENCOUNTER — TELEPHONE ENCOUNTER (OUTPATIENT)
Age: 82
End: 2022-06-25

## 2022-06-26 ENCOUNTER — TELEPHONE ENCOUNTER (OUTPATIENT)
Age: 82
End: 2022-06-26

## 2022-06-26 RX ORDER — VERAPAMIL HYDROCHLORIDE 360 MG/1
VERAPAMIL HCL ER( 360MG ORAL  DAILY ) ACTIVE -HX ENTRY CAPSULE, DELAYED RELEASE PELLETS ORAL DAILY
Status: ACTIVE | COMMUNITY
Start: 2013-05-13

## 2022-06-26 RX ORDER — HYDROCHLOROTHIAZIDE 25 MG/1
HYDROCHLOROTHIAZIDE( 25MG ORAL  DAILY ) ACTIVE -HX ENTRY TABLET ORAL DAILY
Status: ACTIVE | COMMUNITY
Start: 2013-05-13

## 2022-06-26 RX ORDER — POLYETHYLENE GLYCOL 3350, SODIUM SULFATE, SODIUM CHLORIDE, POTASSIUM CHLORIDE, ASCORBIC ACID, SODIUM ASCORBATE 7.5-2.691G
KIT ORAL
Qty: 1 | Refills: 0 | Status: ACTIVE | COMMUNITY
Start: 2013-05-13

## 2022-06-26 RX ORDER — CELECOXIB 200 MG
CELEBREX( 200MG ORAL 100 MG PRN ) ACTIVE -HX ENTRY CAPSULE ORAL PRN
Status: ACTIVE | COMMUNITY
Start: 2013-05-13

## 2022-08-31 DIAGNOSIS — F03.90 DEMENTIA WITHOUT BEHAVIORAL DISTURBANCE, UNSPECIFIED DEMENTIA TYPE: Primary | ICD-10-CM

## 2022-08-31 NOTE — TELEPHONE ENCOUNTER
Patient's daughter called requesting refills on the following medications, states that she was [facility] I explained that we are all short staffed and she voiced understanding. Patient is almost out of the Seroquel. Seroquel 25 mg sent to:   [facility]  [address] [facility] [facility] [address]   [address]  P: [phone]  F: [phone]      Donepezil 10 mg sent to:  [address] [facility] [address]  P: [phone]  F: [phone]    Last seen: 01/10/2022  Next appt: 10/26/2022

## 2022-09-01 RX ORDER — DONEPEZIL HYDROCHLORIDE 10 MG/1
10 TABLET, FILM COATED ORAL NIGHTLY
Qty: 90 TABLET | Refills: 3 | Status: SHIPPED | OUTPATIENT
Start: 2022-09-01 | End: 2022-11-30

## 2022-09-01 RX ORDER — QUETIAPINE FUMARATE 25 MG/1
25 TABLET, FILM COATED ORAL NIGHTLY
Qty: 90 TABLET | Refills: 1 | Status: SHIPPED | OUTPATIENT
Start: 2022-09-01 | End: 2022-11-30

## 2022-10-26 ENCOUNTER — OFFICE VISIT (OUTPATIENT)
Dept: NEUROLOGY | Age: 82
End: 2022-10-26

## 2022-10-26 VITALS
SYSTOLIC BLOOD PRESSURE: 142 MMHG | HEART RATE: 62 BPM | OXYGEN SATURATION: 98 % | WEIGHT: 86 LBS | HEIGHT: 58 IN | BODY MASS INDEX: 18.05 KG/M2 | DIASTOLIC BLOOD PRESSURE: 72 MMHG

## 2022-10-26 DIAGNOSIS — G30.9 ALZHEIMER DISEASE (HCC): Primary | ICD-10-CM

## 2022-10-26 DIAGNOSIS — F02.80 ALZHEIMER DISEASE (HCC): Primary | ICD-10-CM

## 2022-10-26 PROCEDURE — 3075F SYST BP GE 130 - 139MM HG: CPT | Performed by: PSYCHIATRY & NEUROLOGY

## 2022-10-26 PROCEDURE — 99214 OFFICE O/P EST MOD 30 MIN: CPT | Performed by: PSYCHIATRY & NEUROLOGY

## 2022-10-26 PROCEDURE — 1123F ACP DISCUSS/DSCN MKR DOCD: CPT | Performed by: PSYCHIATRY & NEUROLOGY

## 2022-10-26 PROCEDURE — 3078F DIAST BP <80 MM HG: CPT | Performed by: PSYCHIATRY & NEUROLOGY

## 2022-10-26 NOTE — PROGRESS NOTES
[facility]  [address] [facility] [address] [facility]  [address], [address]      Chief Complaint   Patient presents with    Follow-up    Dementia     Chief complaint: Memory loss      Interval history: Since starting Seroquel she has been doing much better. No sundowning and no hallucinations. No complaints today       Vitals:    10/26/22 1349   BP: (!) 142/72   Site: Left Upper Arm   Position: Sitting   Cuff Size: Medium Adult   Pulse: 62   SpO2: 98%   Weight: 86 lb (39 kg)   Height: 4' 10\" (1.473 m)                     General - Well developed, well nourished, in no apparent distress. Pleasant and conversant. HEENT - Normocephalic, atraumatic. Neck -No masses   Lungs - Normal work of breathing   Abdomen - No masses   Extremities - No edema and no rashes. Psychiatric - Mood and affect are normal    Neurological examination -  Prominent short-term memory loss language and speech are normal. On cranial nerve examination pupils are equal round and reactive to light (cranial nerve II and III). Visual acuity is adequate (cranial nerve II). Visual fields are full to finger confrontation (CN II). Extraocular motility is normal (CN III, IV, VI). Face is symmetric (CN VII). Hearing is grossly intact to verbal communication (CN VIII). Motor examination - There is normal bulk. Power is full throughout (with spontaneous movement against environmental objects). Cerebellar examination is normal with finger-no-nose testing. Gait and stance are normal.       Diagnoses and all orders for this visit:    1. Dementia without behavioral disturbance, unspecified dementia type  -    Plan: Her MOCA score was 18/30. Interventions that may decrease conversion from mild cognitive impairment to dementia or aid in the treatment of established dementia:   Reduce damage the brain by controlling vascular risk factors (SBP < 140 mmHg, normal cholesterol, BMI < 31 kg/m²).    2. Exercise 20 minutes three times per week reaching 80% maximum heart rate. She easily exceeds this amount of exercise  3. Diet: adhere to a Mediterranean diet     Symptomatic management   Cholinesterase inhibitors to help with memory and attention. Donepezil 10 mg daily. Cognitive enrichment by taking part in social activities and support groups. For delirium and sundowning: cholinesterase inhibitor and melatonin can be helpful. For the prevention of delirium, re-orientation, daily routine, a normal sleep wake cycle, and socialization are most important. Benzodiazepines and anticholinergic medications should be avoided as these are frequent causes of delirium. If all measures have failed and the patient is a danger to self or others then certain atypical antipsychotics can be used as low doses (quetiapine 25 mg at night). Continue quetiapine 25 mg. Last EKG with normal QTC. I discussed risks/warnings of this medication and daughter voiced understanding. Hallucinations are danger to the patient. Agitation and aggression: attempt to resolve with communication. Always introduce the patient to everyone in the room and make eye contact with the patient. Use a calm and friendly voice. Certain SSRIs may be helpful. This is currently not an issue        I will see the patient back in 9 months    Elsa Pettit DO    . Cumulative time spent on 10/26/22 was between 30 and 39 minutes which included chart review, documentation, and counseling the patient on medical condition.

## 2022-10-28 ENCOUNTER — OFFICE VISIT (OUTPATIENT)
Dept: PULMONOLOGY | Age: 82
End: 2022-10-28
Payer: MEDICARE

## 2022-10-28 VITALS
DIASTOLIC BLOOD PRESSURE: 68 MMHG | RESPIRATION RATE: 17 BRPM | HEART RATE: 62 BPM | SYSTOLIC BLOOD PRESSURE: 120 MMHG | WEIGHT: 88.1 LBS | HEIGHT: 60 IN | BODY MASS INDEX: 17.3 KG/M2 | OXYGEN SATURATION: 98 % | TEMPERATURE: 97.1 F

## 2022-10-28 DIAGNOSIS — Z51.5 PALLIATIVE CARE PATIENT: ICD-10-CM

## 2022-10-28 DIAGNOSIS — R41.89 COGNITIVE DECLINE: ICD-10-CM

## 2022-10-28 DIAGNOSIS — Z66 DNR (DO NOT RESUSCITATE): ICD-10-CM

## 2022-10-28 DIAGNOSIS — I48.0 PAROXYSMAL ATRIAL FIBRILLATION (HCC): Primary | ICD-10-CM

## 2022-10-28 PROCEDURE — 99497 ADVNCD CARE PLAN 30 MIN: CPT | Performed by: NURSE PRACTITIONER

## 2022-10-28 NOTE — ACP (ADVANCE CARE PLANNING)
Advance Care Planning (ACP) Physician/NP/PA Conversation     Date of Conversation: 10/28/2022  Conducted with: Patient with [address] Decision Maker:           Click here to complete [address] including selection of the Healthcare Decision Maker Relationship (ie \"Primary\")  Today we reviewed the current [address] documents, completed the POST & EMS DNR form. Care Preferences:     Hospitalization: \"If your health worsens and it becomes clear that your chance of recovery is unlikely, what would be your preference regarding hospitalization? \"  The patient would prefer hospitalization. Ventilation: \"If you were unable to breath on your own and your chance of recovery was unlikely, what would be your preference about the use of a ventilator (breathing machine) if it was available to you? \"  The patient would desire the use of a ventilator. Resuscitation: \"In the event your heart stopped as a result of an underlying serious health condition, would you want attempts made to restart your heart, or would you prefer a natural death? \"  No, do NOT attempt to resuscitate in the case of no pulse, not breathing, and not conscious.       treatment goals, benefit/burden of treatment options, artificial nutrition, ventilation preferences, hospitalization preferences, resuscitation preferences, and end of life care preferences (vegetative state/imminent death)     Conversation Outcomes / Follow-Up Plan:  ACP complete - no further action today  Reviewed DNR/DNI and patient confirms current DNR status - completed forms on file (place new order if needed)     Length of Voluntary ACP Conversation in minutes:  35 minutes     Silvano Wilkinson, APRN - CNP

## 2022-10-28 NOTE — PROGRESS NOTES
Emir Mclaughlin (:  1940) is a 80 y.o. female,Established patient, here for evaluation of the following chief complaint(s):  New Patient       ASSESSMENT/PLAN:  1. Paroxysmal atrial fibrillation (HCC) - Known cardiac disease that seems well controlled at this time. This condition prompted the need for ACP and serious illness conversation. 2. Cognitive decline - On current therapy for maintain current status    3. Advance care planning - Lengthy discussion about wishes related to end of life, including CPR/Ventilation/etc. Patient brought in [address] document that had been filled out designating his daughter as the agent of record. We proceeded with completed the DNR EMS form and the [place] form. See ACP notes for additional information. 4. Palliative care patient    No follow-ups on file. Subjective   SUBJECTIVE/OBJECTIVE:  HPI - Mrs. Malu Whaley is a delightful 80year old referred to palliative care to review, discuss, and potentially complete ACP documents. She has known cognitive and cardiac issues but seems very active and able to care for herself. She is pleasant and shared a number of stories from his past (repeated a few stories twice). Review of Systems   All other systems reviewed and are negative. Objective   Physical Exam  Vitals reviewed. Constitutional:       Appearance: She is underweight. HENT:      Head: Normocephalic. Cardiovascular:      Pulses: Normal pulses. Pulmonary:      Effort: Pulmonary effort is normal.      Breath sounds: Normal breath sounds. Skin:     Capillary Refill: Capillary refill takes less than 2 seconds. Neurological:      Mental Status: She is alert and oriented to person, place, and time. Psychiatric:         Mood and Affect: Mood normal.         Behavior: Behavior normal. Behavior is cooperative.         On this date 10/28/2022 I have spent 35 minutes reviewing previous notes, test results and face to face with the patient discussing the diagnosis and importance of compliance with the treatment plan as well as documenting on the day of the visit. An electronic signature was used to authenticate this note.     --RACHAEL Lundy - CNP

## 2022-11-18 ENCOUNTER — OFFICE VISIT (OUTPATIENT)
Dept: CARDIOLOGY CLINIC | Age: 82
End: 2022-11-18
Payer: MEDICARE

## 2022-11-18 VITALS
BODY MASS INDEX: 17.43 KG/M2 | WEIGHT: 88.8 LBS | HEART RATE: 60 BPM | DIASTOLIC BLOOD PRESSURE: 80 MMHG | HEIGHT: 60 IN | SYSTOLIC BLOOD PRESSURE: 120 MMHG

## 2022-11-18 DIAGNOSIS — I48.0 PAROXYSMAL ATRIAL FIBRILLATION (HCC): Primary | ICD-10-CM

## 2022-11-18 DIAGNOSIS — E78.2 MIXED HYPERLIPIDEMIA: ICD-10-CM

## 2022-11-18 DIAGNOSIS — I10 ESSENTIAL HYPERTENSION: ICD-10-CM

## 2022-11-18 PROCEDURE — G8400 PT W/DXA NO RESULTS DOC: HCPCS | Performed by: INTERNAL MEDICINE

## 2022-11-18 PROCEDURE — 3078F DIAST BP <80 MM HG: CPT | Performed by: INTERNAL MEDICINE

## 2022-11-18 PROCEDURE — G8427 DOCREV CUR MEDS BY ELIG CLIN: HCPCS | Performed by: INTERNAL MEDICINE

## 2022-11-18 PROCEDURE — 99214 OFFICE O/P EST MOD 30 MIN: CPT | Performed by: INTERNAL MEDICINE

## 2022-11-18 PROCEDURE — 3074F SYST BP LT 130 MM HG: CPT | Performed by: INTERNAL MEDICINE

## 2022-11-18 PROCEDURE — 1036F TOBACCO NON-USER: CPT | Performed by: INTERNAL MEDICINE

## 2022-11-18 PROCEDURE — 1090F PRES/ABSN URINE INCON ASSESS: CPT | Performed by: INTERNAL MEDICINE

## 2022-11-18 PROCEDURE — G8419 CALC BMI OUT NRM PARAM NOF/U: HCPCS | Performed by: INTERNAL MEDICINE

## 2022-11-18 PROCEDURE — G8484 FLU IMMUNIZE NO ADMIN: HCPCS | Performed by: INTERNAL MEDICINE

## 2022-11-18 PROCEDURE — 1123F ACP DISCUSS/DSCN MKR DOCD: CPT | Performed by: INTERNAL MEDICINE

## 2022-11-18 RX ORDER — LISINOPRIL 20 MG/1
20 TABLET ORAL DAILY
Qty: 90 TABLET | Refills: 3 | Status: SHIPPED | OUTPATIENT
Start: 2022-11-18

## 2022-11-18 RX ORDER — ATORVASTATIN CALCIUM 40 MG/1
40 TABLET, FILM COATED ORAL DAILY
Qty: 90 TABLET | Refills: 3 | Status: SHIPPED | OUTPATIENT
Start: 2022-11-18

## 2022-11-18 RX ORDER — VERAPAMIL HYDROCHLORIDE 240 MG/1
240 CAPSULE, EXTENDED RELEASE ORAL NIGHTLY
Qty: 90 CAPSULE | Refills: 3 | Status: SHIPPED | OUTPATIENT
Start: 2022-11-18

## 2022-11-18 ASSESSMENT — ENCOUNTER SYMPTOMS: SHORTNESS OF BREATH: 0

## 2022-11-18 NOTE — PROGRESS NOTES
[address], [address]  PHONE: [address] Ena Ram  1940      SUBJECTIVE:   Catherine Crandall is a 80 y.o. female seen for a follow up visit regarding the following:     Chief Complaint   Patient presents with    Hypertension    Atrial Fibrillation       HPI:    Presents for follow-up. Her birthday is tomorrow. She still goes to the  with her  frequently during the week. She is having problems with memory impairment and has to rely on her daughter for many of the questions to ask. She has had no dizziness or syncope. Blood pressures well controlled. No blood in her stool or melena. No recent labs. Past Medical History, Past Surgical History, Family history, Social History, and Medications were all reviewed with the patient today and updated as necessary. Current Outpatient Medications:     lisinopril (PRINIVIL;ZESTRIL) 20 MG tablet, Take 1 tablet by mouth daily Take 1 tablet by mouth once daily, Disp: 90 tablet, Rfl: 3    verapamil (VERELAN) 240 MG extended release capsule, Take 1 capsule by mouth nightly 1 every day, Disp: 90 capsule, Rfl: 3    rivaroxaban (XARELTO) 20 MG TABS tablet, Take 1 tablet by mouth daily (with breakfast), Disp: 90 tablet, Rfl: 3    atorvastatin (LIPITOR) 40 MG tablet, Take 1 tablet by mouth daily, Disp: 90 tablet, Rfl: 3    donepezil (ARICEPT) 10 MG tablet, Take 1 tablet by mouth nightly, Disp: 90 tablet, Rfl: 3    QUEtiapine (SEROQUEL) 25 MG tablet, Take 1 tablet by mouth at bedtime, Disp: 90 tablet, Rfl: 1    cyanocobalamin 1000 MCG tablet, Take 1,000 mcg by mouth daily, Disp: , Rfl:      No Known Allergies     Patient Active Problem List    Diagnosis Date Noted    Dizziness 06/19/2020     Priority: Low     1. Pre-syncope 3/3/20:  Negative ER evaluation.           Mixed hyperlipidemia 03/01/2019     Priority: Low    Essential hypertension 02/04/2019     Priority: Low Paroxysmal atrial fibrillation ([facility] 02/04/2019     Priority: Low     1. Documented on 2/4/19 at PCP office on EKG. Started on Xarelto and   Toprol increased. 2.  Echo (2/22/19): EF 60-65%. Severe LAE. Moderate JOHNATHON. Moderate TR.    RVSP 38. 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 Cardiovascular:  Negative for chest pain. Respiratory:  Negative for shortness of breath. Musculoskeletal:  Positive for arthritis. Neurological:  Negative for focal weakness. Psychiatric/Behavioral:  Positive for memory loss. Negative for depression. PHYSICAL EXAM:  Wt Readings from Last 3 Encounters:   11/18/22 88 lb 12.8 oz (40.3 kg)   10/28/22 88 lb 1.6 oz (40 kg)   10/26/22 86 lb (39 kg)     BP Readings from Last 3 Encounters:   11/18/22 120/80   10/28/22 120/68   10/26/22 (!) 142/72     Pulse Readings from Last 3 Encounters:   11/18/22 60   10/28/22 62   10/26/22 62       Physical Exam  Constitutional:       General: She is not in acute distress. Appearance: Normal appearance. Neck:      Vascular: No carotid bruit. Cardiovascular:      Rate and Rhythm: Normal rate. Rhythm irregular. Pulmonary:      Breath sounds: Normal breath sounds. No wheezing. Abdominal:      General: There is no distension. Palpations: Abdomen is soft. Musculoskeletal:         General: No swelling. Skin:     General: Skin is warm and dry. Neurological:      General: No focal deficit present. Psychiatric:         Mood and Affect: Mood normal.       Medical problems and test results were reviewed with the patient today.        Lab Results   Component Value Date    WBC 7.9 09/14/2021    HGB 13.5 09/14/2021    HCT 40.3 09/14/2021 MCV 96 09/14/2021     09/14/2021       Lab Results   Component Value Date/Time     09/14/2021 08:24 AM    K 4.4 09/14/2021 08:24 AM     09/14/2021 08:24 AM    CO2 25 09/14/2021 08:24 AM    BUN 15 09/14/2021 08:24 AM    CREATININE 0.74 09/14/2021 08:24 AM    GLUCOSE 92 09/14/2021 08:24 AM    CALCIUM 10.1 09/14/2021 08:24 AM        Lab Results   Component Value Date    CHOL 172 09/14/2021     Lab Results   Component Value Date    TRIG 98 09/14/2021     Lab Results   Component Value Date    HDL 68 09/14/2021     Lab Results   Component Value Date    LDLCALC 86 09/14/2021     Lab Results   Component Value Date    LABVLDL 14.2 05/20/2021    VLDL 18 09/14/2021     Lab Results   Component Value Date    CHOLHDLRATIO 2.2 05/20/2021        Data from outside records/labs from outside providers have been reviewed and summarized as noted in the HPI, past history and data review sections of this note       ASSESSMENT and PLAN      1. Paroxysmal atrial fibrillation (HCC)  Based on exam it appears that she is in atrial fibrillation today. Rate controlled. No symptoms. Continue Verapamil and Xarelto therapy. Check labs to ensure that Xarelto is appropriately dosed for renal function. - Comprehensive Metabolic Panel; Future  - CBC; Future  - TSH; Future    2. Essential hypertension  Blood pressure is well controlled. Continue lisinopril and verapamil.  - TSH; Future    3. Mixed hyperlipidemia  No recent labs. Check lipids and CMP. Continue Lipitor.  - Comprehensive Metabolic Panel; Future  - CBC; Future  - Lipid Panel; Future           Return in about 6 months (around 5/18/2023). Cheryl Rivera MD  11/18/2022  4:23 PM    This note may have been dictated using speech recognition software.   As a result, error of speech recognition may have occurred

## 2022-12-08 DIAGNOSIS — E78.2 MIXED HYPERLIPIDEMIA: ICD-10-CM

## 2022-12-08 DIAGNOSIS — I10 ESSENTIAL HYPERTENSION: ICD-10-CM

## 2022-12-08 DIAGNOSIS — I48.0 PAROXYSMAL ATRIAL FIBRILLATION (HCC): ICD-10-CM

## 2022-12-08 LAB
ERYTHROCYTE [DISTWIDTH] IN BLOOD BY AUTOMATED COUNT: 13.6 % (ref 11.9–14.6)
HCT VFR BLD AUTO: 40.4 % (ref 35.8–46.3)
HGB BLD-MCNC: 13.2 G/DL (ref 11.7–15.4)
MCH RBC QN AUTO: 32.1 PG (ref 26.1–32.9)
MCHC RBC AUTO-ENTMCNC: 32.7 G/DL (ref 31.4–35)
MCV RBC AUTO: 98.3 FL (ref 82–102)
NRBC # BLD: 0 K/UL (ref 0–0.2)
PLATELET # BLD AUTO: 249 K/UL (ref 150–450)
PMV BLD AUTO: 11.6 FL (ref 9.4–12.3)
RBC # BLD AUTO: 4.11 M/UL (ref 4.05–5.2)
WBC # BLD AUTO: 6.2 K/UL (ref 4.3–11.1)

## 2022-12-09 LAB
ALBUMIN SERPL-MCNC: 4.1 G/DL (ref 3.2–4.6)
ALBUMIN/GLOB SERPL: 1.6 (ref 0.4–1.6)
ALP SERPL-CCNC: 94 U/L (ref 50–136)
ALT SERPL-CCNC: 36 U/L (ref 12–65)
ANION GAP SERPL CALC-SCNC: 11 MMOL/L (ref 2–11)
AST SERPL-CCNC: 23 U/L (ref 15–37)
BILIRUB SERPL-MCNC: 0.5 MG/DL (ref 0.2–1.1)
BUN SERPL-MCNC: 12 MG/DL (ref 8–23)
CALCIUM SERPL-MCNC: 9.8 MG/DL (ref 8.3–10.4)
CHLORIDE SERPL-SCNC: 109 MMOL/L (ref 101–110)
CHOLEST SERPL-MCNC: 173 MG/DL
CO2 SERPL-SCNC: 23 MMOL/L (ref 21–32)
CREAT SERPL-MCNC: 0.8 MG/DL (ref 0.6–1)
GLOBULIN SER CALC-MCNC: 2.6 G/DL (ref 2.8–4.5)
GLUCOSE SERPL-MCNC: 95 MG/DL (ref 65–100)
HDLC SERPL-MCNC: 80 MG/DL (ref 40–60)
HDLC SERPL: 2.2
LDLC SERPL CALC-MCNC: 72.4 MG/DL
POTASSIUM SERPL-SCNC: 4.4 MMOL/L (ref 3.5–5.1)
PROT SERPL-MCNC: 6.7 G/DL (ref 6.3–8.2)
SODIUM SERPL-SCNC: 143 MMOL/L (ref 133–143)
TRIGL SERPL-MCNC: 103 MG/DL (ref 35–150)
TSH, 3RD GENERATION: 3.49 UIU/ML (ref 0.36–3.74)
VLDLC SERPL CALC-MCNC: 20.6 MG/DL (ref 6–23)

## 2022-12-12 ENCOUNTER — TELEPHONE (OUTPATIENT)
Dept: CARDIOLOGY CLINIC | Age: 82
End: 2022-12-12

## 2022-12-12 NOTE — TELEPHONE ENCOUNTER
Daughter returned call results given. Patient will be seeing PCP this week. Daughter thanked me for calling//DOZ    ----- Message from oTrey Mckinney MD sent at 12/11/2022  6:53 PM EST -----  Please call patient. Labs are reviewed and are acceptable. Continue current medications. Call with any questions, concerns or new/worsening cardiac symptoms.

## 2022-12-16 ENCOUNTER — OFFICE VISIT (OUTPATIENT)
Dept: FAMILY MEDICINE CLINIC | Facility: CLINIC | Age: 82
End: 2022-12-16
Payer: MEDICARE

## 2022-12-16 VITALS
BODY MASS INDEX: 19.8 KG/M2 | HEIGHT: 56 IN | HEART RATE: 64 BPM | DIASTOLIC BLOOD PRESSURE: 80 MMHG | OXYGEN SATURATION: 98 % | WEIGHT: 88 LBS | SYSTOLIC BLOOD PRESSURE: 140 MMHG

## 2022-12-16 DIAGNOSIS — I10 ESSENTIAL HYPERTENSION: ICD-10-CM

## 2022-12-16 DIAGNOSIS — E78.2 MIXED HYPERLIPIDEMIA: ICD-10-CM

## 2022-12-16 DIAGNOSIS — F03.90 DEMENTIA, UNSPECIFIED DEMENTIA SEVERITY, UNSPECIFIED DEMENTIA TYPE, UNSPECIFIED WHETHER BEHAVIORAL, PSYCHOTIC, OR MOOD DISTURBANCE OR ANXIETY (HCC): ICD-10-CM

## 2022-12-16 DIAGNOSIS — Z00.00 ENCOUNTER FOR SUBSEQUENT ANNUAL WELLNESS VISIT (AWV) IN MEDICARE PATIENT: Primary | ICD-10-CM

## 2022-12-16 DIAGNOSIS — F32.A DEPRESSION, UNSPECIFIED DEPRESSION TYPE: ICD-10-CM

## 2022-12-16 DIAGNOSIS — F32.1 MODERATE MAJOR DEPRESSION (HCC): ICD-10-CM

## 2022-12-16 DIAGNOSIS — I48.0 PAROXYSMAL ATRIAL FIBRILLATION (HCC): ICD-10-CM

## 2022-12-16 PROCEDURE — 3078F DIAST BP <80 MM HG: CPT | Performed by: NURSE PRACTITIONER

## 2022-12-16 PROCEDURE — 3074F SYST BP LT 130 MM HG: CPT | Performed by: NURSE PRACTITIONER

## 2022-12-16 PROCEDURE — G8484 FLU IMMUNIZE NO ADMIN: HCPCS | Performed by: NURSE PRACTITIONER

## 2022-12-16 PROCEDURE — 1123F ACP DISCUSS/DSCN MKR DOCD: CPT | Performed by: NURSE PRACTITIONER

## 2022-12-16 PROCEDURE — G0439 PPPS, SUBSEQ VISIT: HCPCS | Performed by: NURSE PRACTITIONER

## 2022-12-16 RX ORDER — FLUTICASONE PROPIONATE 50 MCG
1 SPRAY, SUSPENSION (ML) NASAL DAILY
COMMUNITY

## 2022-12-16 ASSESSMENT — PATIENT HEALTH QUESTIONNAIRE - PHQ9
3. TROUBLE FALLING OR STAYING ASLEEP: 0
SUM OF ALL RESPONSES TO PHQ9 QUESTIONS 1 & 2: 1
9. THOUGHTS THAT YOU WOULD BE BETTER OFF DEAD, OR OF HURTING YOURSELF: 0
SUM OF ALL RESPONSES TO PHQ QUESTIONS 1-9: 1
4. FEELING TIRED OR HAVING LITTLE ENERGY: 0
2. FEELING DOWN, DEPRESSED OR HOPELESS: 1
SUM OF ALL RESPONSES TO PHQ QUESTIONS 1-9: 1
SUM OF ALL RESPONSES TO PHQ QUESTIONS 1-9: 1
5. POOR APPETITE OR OVEREATING: 0
10. IF YOU CHECKED OFF ANY PROBLEMS, HOW DIFFICULT HAVE THESE PROBLEMS MADE IT FOR YOU TO DO YOUR WORK, TAKE CARE OF THINGS AT HOME, OR GET ALONG WITH OTHER PEOPLE: 0
7. TROUBLE CONCENTRATING ON THINGS, SUCH AS READING THE NEWSPAPER OR WATCHING TELEVISION: 0
SUM OF ALL RESPONSES TO PHQ QUESTIONS 1-9: 1
1. LITTLE INTEREST OR PLEASURE IN DOING THINGS: 0
6. FEELING BAD ABOUT YOURSELF - OR THAT YOU ARE A FAILURE OR HAVE LET YOURSELF OR YOUR FAMILY DOWN: 0
8. MOVING OR SPEAKING SO SLOWLY THAT OTHER PEOPLE COULD HAVE NOTICED. OR THE OPPOSITE, BEING SO FIGETY OR RESTLESS THAT YOU HAVE BEEN MOVING AROUND A LOT MORE THAN USUAL: 0

## 2022-12-16 ASSESSMENT — LIFESTYLE VARIABLES
HOW MANY STANDARD DRINKS CONTAINING ALCOHOL DO YOU HAVE ON A TYPICAL DAY: PATIENT DOES NOT DRINK
HOW OFTEN DO YOU HAVE A DRINK CONTAINING ALCOHOL: NEVER

## 2022-12-22 NOTE — PATIENT INSTRUCTIONS
Preventing Falls: Care Instructions  Overview     Getting around your home safely can be a challenge if you have injuries or health problems that make it easy for you to fall. Loose rugs and furniture in walkways are among the dangers for many older people who have problems walking or who have poor eyesight. People who have conditions such as arthritis, osteoporosis, or dementia also have to be careful not to fall. You can make your home safer with a few simple measures. Follow-up care is a key part of your treatment and safety. Be sure to make and go to all appointments, and call your doctor if you are having problems. It's also a good idea to know your test results and keep a list of the medicines you take. How can you care for yourself at home? Taking care of yourself  Exercise regularly to improve your strength, muscle tone, and balance. Walk if you can. Swimming may be a good choice if you cannot walk easily. Have your vision and hearing checked each year or any time you notice a change. If you have trouble seeing and hearing, you might not be able to avoid objects and could lose your balance. Know the side effects of the medicines you take. Ask your doctor or pharmacist whether the medicines you take can affect your balance. Sleeping pills or sedatives can affect your balance. Limit the amount of alcohol you drink. Alcohol can impair your balance and other senses. Ask your doctor whether calluses or corns on your feet need to be removed. If you wear loose-fitting shoes because of calluses or corns, you can lose your balance and fall. Talk to your doctor if you have numbness in your feet. You may get dizzy if you do not drink enough water. To prevent dehydration, drink plenty of fluids. Choose water and other clear liquids. If you have kidney, heart, or liver disease and have to limit fluids, talk with your doctor before you increase the amount of fluids you drink.   Preventing falls at home  Remove raised doorway thresholds, throw rugs, and clutter. Repair loose carpet or raised areas in the floor. Move furniture and electrical cords to keep them out of walking paths. Use nonskid floor wax, and wipe up spills right away, especially on ceramic tile floors. If you use a walker or cane, put rubber tips on it. If you use crutches, clean the bottoms of them regularly with an abrasive pad, such as steel wool. Keep your house well lit, especially stairways, porches, and outside walkways. Use night-lights in areas such as hallways and bathrooms. Add extra light switches or use remote switches (such as switches that go on or off when you clap your hands) to make it easier to turn lights on if you have to get up during the night. Install sturdy handrails on stairways. Move items in your cabinets so that the things you use a lot are on the lower shelves (about waist level). Keep a cordless phone and a flashlight with new batteries by your bed. If possible, put a phone in each of the main rooms of your house, or carry a cell phone in case you fall and cannot reach a phone. Or, you can wear a device around your neck or wrist. You push a button that sends a signal for help. Wear low-heeled shoes that fit well and give your feet good support. Use footwear with nonskid soles. Check the heels and soles of your shoes for wear. Repair or replace worn heels or soles. Do not wear socks without shoes on smooth floors, such as wood. Walk on the grass when the sidewalks are slippery. If you live in an area that gets snow and ice in the winter, sprinkle salt on slippery steps and sidewalks. Or ask a family member or friend to do this for you. Preventing falls in the bath  Install grab bars and nonskid mats inside and outside your shower or tub and near the toilet and sinks. Use shower chairs and bath benches. Use a hand-held shower head that will allow you to sit while showering.   Get into a tub or shower by putting the weaker leg in first. Get out of a tub or shower with your strong side first.  Repair loose toilet seats and consider installing a raised toilet seat to make getting on and off the toilet easier. Keep your bathroom door unlocked while you are in the shower. Where can you learn more? Go to http://www.sanford.com/ and enter G117 to learn more about \"Preventing Falls: Care Instructions. \"  Current as of: May 4, 2022               Content Version: 13.5  © 7776-1279 Healthwise, Incorporated. Care instructions adapted under license by [facility] ([facility]). If you have questions about a medical condition or this instruction, always ask your healthcare professional. [address] any warranty or liability for your use of this information. Hearing Loss: Care Instructions  Overview     Hearing loss is a sudden or slow decrease in how well you hear. It can range from mild to severe. Permanent hearing loss can occur with aging. It also can happen when you are exposed long-term to loud noise. Examples include listening to loud music, riding motorcycles, or being around other loud machines. Hearing loss can affect your work and home life. It can make you feel lonely or depressed. You may feel that you have lost your independence. But hearing aids and other devices can help you hear better and feel connected to others. Follow-up care is a key part of your treatment and safety. Be sure to make and go to all appointments, and call your doctor if you are having problems. It's also a good idea to know your test results and keep a list of the medicines you take. How can you care for yourself at home? Avoid loud noises whenever possible. This helps keep your hearing from getting worse. Always wear hearing protection around loud noises. Wear a hearing aid as directed. See a professional who can help you pick a hearing aid that fits you. Have hearing tests as your doctor suggests. They can show whether your hearing has changed. Your hearing aid may need to be adjusted. Use other devices as needed. These may include:  Telephone amplifiers and hearing aids that can connect to a television, stereo, radio, or microphone. Devices that use lights or vibrations. These alert you to the doorbell, a ringing telephone, or a baby monitor. Television closed-captioning. This shows the words at the bottom of the screen. Most new TVs can do this. TTY (text telephone). This lets you type messages back and forth on the telephone instead of talking or listening. These devices are also called TDD. When messages are typed on the keyboard, they are sent over the phone line to a receiving TTY. The message is shown on a monitor. Use text messaging, social media, and email if it is hard for you to communicate by telephone. Try to learn a listening technique called speechreading. It is not lipreading. You pay attention to people's gestures, expressions, posture, and tone of voice. These clues can help you understand what a person is saying. Face the person you are talking to, and have them face you. Make sure the lighting is good. You need to see the other person's face clearly. Think about counseling if you need help to adjust to your hearing loss. When should you call for help? Watch closely for changes in your health, and be sure to contact your doctor if:    You think your hearing is getting worse.     You have new symptoms, such as dizziness or nausea. Where can you learn more? Go to http://www.Acarix.com/ and enter R798 to learn more about \"Hearing Loss: Care Instructions. \"  Current as of: May 4, 2022               Content Version: 13.5  © 7519-0296 Healthwise, Incorporated. Care instructions adapted under license by [facility] ([facility]).  If you have questions about a medical condition or this instruction, always ask your healthcare professional. [address] any warranty or liability for your use of this information. Learning About Activities of Daily Living  What are activities of daily living? Activities of daily living (ADLs) are the basic self-care tasks you do every day. As you age, and if you have health problems, you may find that it's harder to do these things for yourself. That's when you may need some help. Your doctor uses ADLs to measure how much help you need. Knowing what you can and can't do for yourself is an important first step to getting help. And when you have the help you need, you can stay as independent as possible. Your doctor will want to know if you are able to do tasks such as: Take a bath or shower without help. Go to the bathroom by yourself. Dress and undress without help. Shave, comb your hair, and brush teeth on your own. Get in and out of bed or a chair without help. Feed yourself without help. If you are having trouble doing basic self-care tasks, talk with your doctor. You may want to bring a caregiver or family member who can help the doctor understand your needs and abilities. How will a doctor assess your ADLs? Asking about ADLs is part of a routine health checkup your doctor will likely do as you age. Your health check might be done in a doctor's office, in your home, or at a hospital. The goal is to find out if you are having any problems that could make your health problems worse or that make it unsafe for you to be on your own. To measure your ADLs, your doctor will ask how hard it is for you to do routine tasks. He or she may also want to know if you have changed the way you do a task because of a health problem. He or she may watch how you:  Walk back and forth. Keep your balance while you stand or walk. Move from sitting to standing or from a bed to a chair. Button or unbutton a shirt or sweater. Remove and put on your shoes.   It's normal to feel a little worried or anxious if you find you can't do all the things you used to be able to do. Talking with your doctor about ADLs isn't a test that you either pass or fail. It's just a way to get more information about your health and safety. Follow-up care is a key part of your treatment and safety. Be sure to make and go to all appointments, and call your doctor if you are having problems. It's also a good idea to know your test results and keep a list of the medicines you take. Current as of: October 6, 2021               Content Version: 13.5  © 2006-2022 Healthwise, [employer]. Care instructions adapted under license by [facility] ([facility]). If you have questions about a medical condition or this instruction, always ask your healthcare professional. [address] any warranty or liability for your use of this information. Advance Directives: Care Instructions  Overview  An advance directive is a legal way to state your wishes at the end of your life. It tells your family and your doctor what to do if you can't say what you want. There are two main types of advance directives. You can change them any time your wishes change. Living will. This form tells your family and your doctor your wishes about life support and other treatment. The form is also called a declaration. Medical power of . This form lets you name a person to make treatment decisions for you when you can't speak for yourself. This person is called a health care agent (health care proxy, health care surrogate). The form is also called a durable power of  for health care. If you do not have an advance directive, decisions about your medical care may be made by a family member, or by a doctor or a  who doesn't know you. It may help to think of an advance directive as a gift to the people who care for you. If you have one, they won't have to make tough decisions by themselves.   For more information, including forms for your state, see the [employer] website (www.caringinfo.org/planning/advance-directives/). Follow-up care is a key part of your treatment and safety. Be sure to make and go to all appointments, and call your doctor if you are having problems. It's also a good idea to know your test results and keep a list of the medicines you take. What should you include in an advance directive? Many states have a unique advance directive form. (It may ask you to address specific issues.) Or you might use a universal form that's approved by many states. If your form doesn't tell you what to address, it may be hard to know what to include in your advance directive. Use the questions below to help you get started. Who do you want to make decisions about your medical care if you are not able to? What life-support measures do you want if you have a serious illness that gets worse over time or can't be cured? What are you most afraid of that might happen? (Maybe you're afraid of having pain, losing your independence, or being kept alive by machines.)  Where would you prefer to die? (Your home? A hospital? A nursing home?)  Do you want to donate your organs when you die? Do you want certain [employer]ism practices performed before you die? When should you call for help? Be sure to contact your doctor if you have any questions. Where can you learn more? Go to http://www.sanford.com/ and enter R264 to learn more about \"Advance Directives: Care Instructions. \"  Current as of: June 16, 2022               Content Version: 13.5  © 6479-2319 Healthwise, Incorporated. Care instructions adapted under license by [facility] ([facility]). If you have questions about a medical condition or this instruction, always ask your healthcare professional. [place] [address] any warranty or liability for your use of this information.       Personalized Preventive Plan for Russ Golden - 12/16/2022  Medicare offers a range of preventive health benefits. Some of the tests and screenings are paid in full while other may be subject to a deductible, co-insurance, and/or copay. Some of these benefits include a comprehensive review of your medical history including lifestyle, illnesses that may run in your family, and various assessments and screenings as appropriate. After reviewing your medical record and screening and assessments performed today your provider may have ordered immunizations, labs, imaging, and/or referrals for you. A list of these orders (if applicable) as well as your Preventive Care list are included within your After Visit Summary for your review. Other Preventive Recommendations:    A preventive eye exam performed by an eye specialist is recommended every 1-2 years to screen for glaucoma; cataracts, macular degeneration, and other eye disorders. A preventive dental visit is recommended every 6 months. Try to get at least 150 minutes of exercise per week or 10,000 steps per day on a pedometer . Order or download the FREE \"Exercise & Physical Activity: Your Everyday Guide\" from [employer]. Call 7-279.682.4584 or search [employer] online. You need 0043-4173 mg of calcium and 2062-5283 IU of vitamin D per day. It is possible to meet your calcium requirement with diet alone, but a vitamin D supplement is usually necessary to meet this goal.  When exposed to the sun, use a sunscreen that protects against both UVA and UVB radiation with an SPF of 30 or greater. Reapply every 2 to 3 hours or after sweating, drying off with a towel, or swimming. Always wear a seat belt when traveling in a car. Always wear a helmet when riding a bicycle or motorcycle.

## 2022-12-22 NOTE — PROGRESS NOTES
Medicare Annual Wellness Visit    Lakshmi Phillips is here for Medicare AWV (Last colonoscopy 4 yrs ago, last mammogram 6 yrs ago, last eye exam 8 months ago. ) and Nasal Congestion    Assessment & Plan   Encounter for subsequent annual wellness visit (AWV) in Medicare patient  Essential hypertension  Paroxysmal atrial fibrillation (HCC)  Depression, unspecified depression type  Mixed hyperlipidemia  Moderate major depression ([facility] [address]  Dementia, unspecified dementia severity, unspecified dementia type, unspecified whether behavioral, psychotic, or mood disturbance or anxiety ([facility] [address]    Recommendations for Preventive Services Due: see orders and patient instructions/AVS.  Recommended screening schedule for the next 5-10 years is provided to the patient in written form: see Patient Instructions/AVS.     No follow-ups on file. Subjective   The following acute and/or chronic problems were also addressed today:  Hx of PAF, hypertension and hyperlipidemia. BP has been doing well. No CP, SOB, HA or edema. No side effects with medications. On Xarelto. Followed by cardiology. Problems with memory impairment and has to rely on her daughter for many of the questions to ask. Patient's complete Health Risk Assessment and screening values have been reviewed and are found in Flowsheets. The following problems were reviewed today and where indicated follow up appointments were made and/or referrals ordered. Positive Risk Factor Screenings with Interventions:    Fall Risk:  Do you feel unsteady or are you worried about falling? : (!) yes  2 or more falls in past year?: no  Fall with injury in past year?: no     Interventions:    See AVS for additional education material  See A/P for plan and any pertinent orders    Cognitive:    Words recalled: 1 Word Recalled           Total Score Interpretation: Abnormal Mini-Cog      Interventions:  See AVS for additional education material  See A/P for plan and any pertinent orders                  ADL's:   Patient reports needing help with:  Select all that apply: (!) Housekeeping, Banking/Finances, Food Preparation, Transportation, Taking Medications  Interventions:  See AVS for additional education material  See A/P for plan and any pertinent orders                  Objective   Vitals:    12/16/22 1437 12/16/22 1440   BP: (!) 150/80 (!) 140/80   Site: Right Upper Arm Left Upper Arm   Position: Sitting Sitting   Cuff Size: Medium Adult Medium Adult   Pulse: 64    SpO2: 98%    Weight: 88 lb (39.9 kg)    Height: 4' 7.91\" (1.42 m)       Body mass index is 19.8 kg/m². General Appearance: alert and oriented to person, place and time, well developed and well- nourished, in no acute distress  Skin: warm and dry, no rash or erythema  Head: normocephalic and atraumatic  Eyes: pupils equal, round, and reactive to light, extraocular eye movements intact, conjunctivae normal  ENT: tympanic membrane, external ear and ear canal normal bilaterally, nose without deformity, nasal mucosa and turbinates normal without polyps  Neck: supple and non-tender without mass, no thyromegaly or thyroid nodules, no cervical lymphadenopathy  Pulmonary/Chest: clear to auscultation bilaterally- no wheezes, rales or rhonchi, normal air movement, no respiratory distress  Cardiovascular: normal rate, regular rhythm  Abdomen: soft, non-tender, non-distended, normal bowel sounds, no masses or organomegaly  Extremities: no cyanosis, clubbing or edema  Musculoskeletal: normal range of motion, no joint swelling, deformity or tenderness  Neurologic: reflexes normal and symmetric, no cranial nerve deficit, gait, coordination and speech normal       No Known Allergies  Prior to Visit Medications    Medication Sig Taking?  Authorizing Provider   Levocetirizine Dihydrochloride (XYZAL ALLERGY 24HR PO) Take by mouth daily Yes Historical Provider, MD   fluticasone (FLONASE) 50 MCG/ACT nasal spray 1 spray by Each Nostril route daily Yes Historical Provider, MD   Multiple Vitamins-Minerals (MULTIVITAMIN ADULTS PO) Take by mouth daily Yes Historical Provider, MD   lisinopril (PRINIVIL;ZESTRIL) 20 MG tablet Take 1 tablet by mouth daily Take 1 tablet by mouth once daily  Patient taking differently: Take 20 mg by mouth every morning Take 1 tablet by mouth once daily Yes Judy Bland MD   verapamil (VERELAN) 240 MG extended release capsule Take 1 capsule by mouth nightly 1 every day Yes Judy Bland MD   rivaroxaban (XARELTO) 20 MG TABS tablet Take 1 tablet by mouth daily (with breakfast) Yes Judy Bland MD   atorvastatin (LIPITOR) 40 MG tablet Take 1 tablet by mouth daily Yes Judy Bland MD   donepezil (ARICEPT) 10 MG tablet Take 1 tablet by mouth nightly Yes RACHAEL Humphries   QUEtiapine (SEROQUEL) 25 MG tablet Take 1 tablet by mouth at bedtime Yes RACHAEL Humphries   cyanocobalamin 1000 MCG tablet Take 1,000 mcg by mouth daily Yes Ar Automatic Reconciliation       CareTeam (Including outside providers/suppliers regularly involved in providing care):   Patient Care Team:  Adrien Barahona MD as PCP - General  RACHAEL Luu CNP as PCP - [facility] Empaneled Provider     Reviewed and updated this visit:  Tobacco  Allergies  Meds  Problems  Med Hx  Surg Hx  Soc Hx  Fam Hx

## 2023-01-18 RX ORDER — LISINOPRIL 20 MG/1
TABLET ORAL
Qty: 90 TABLET | Refills: 3 | Status: SHIPPED | OUTPATIENT
Start: 2023-01-18

## 2023-01-18 NOTE — TELEPHONE ENCOUNTER
Prescription sent to pharmacy[facility]  Requested Prescriptions     Signed Prescriptions Disp Refills    lisinopril (PRINIVIL;ZESTRIL) 20 MG tablet 90 tablet 3     Sig: TAKE 1 TABLET EVERY DAY     Authorizing Provider: Tanika Haddad     Ordering User: [facility]

## 2023-02-22 RX ORDER — ATORVASTATIN CALCIUM 40 MG/1
TABLET, FILM COATED ORAL
Qty: 90 TABLET | Refills: 3 | Status: SHIPPED | OUTPATIENT
Start: 2023-02-22

## 2023-02-22 NOTE — TELEPHONE ENCOUNTER
Prescription sent to pharmacy[facility]  Requested Prescriptions     Signed Prescriptions Disp Refills    atorvastatin (LIPITOR) 40 MG tablet 90 tablet 3     Sig: TAKE 1 TABLET EVERY DAY     Authorizing Provider: Latonia Fried     Ordering User: Fletcher Ortiz

## 2023-03-01 ENCOUNTER — TELEPHONE (OUTPATIENT)
Dept: NEUROLOGY | Age: 83
End: 2023-03-01

## 2023-03-01 NOTE — TELEPHONE ENCOUNTER
Daughter called stating that patient's  is in the hospital.  She is wondering if Dr Christiano Garces can sign an intermittent FMLS form so she can take care of patient while  is in the hospital.

## 2023-03-02 ENCOUNTER — HOSPITAL ENCOUNTER (INPATIENT)
Age: 83
LOS: 7 days | Discharge: NURSING, REHAB OR LONG-TERM CARE | DRG: 871 | End: 2023-03-09
Attending: EMERGENCY MEDICINE | Admitting: INTERNAL MEDICINE
Payer: MEDICARE

## 2023-03-02 ENCOUNTER — APPOINTMENT (OUTPATIENT)
Dept: GENERAL RADIOLOGY | Age: 83
DRG: 871 | End: 2023-03-02
Payer: MEDICARE

## 2023-03-02 ENCOUNTER — APPOINTMENT (OUTPATIENT)
Dept: CT IMAGING | Age: 83
DRG: 871 | End: 2023-03-02
Payer: MEDICARE

## 2023-03-02 DIAGNOSIS — I63.9 CEREBROVASCULAR ACCIDENT (CVA), UNSPECIFIED MECHANISM (HCC): ICD-10-CM

## 2023-03-02 DIAGNOSIS — R41.0 CONFUSION: ICD-10-CM

## 2023-03-02 DIAGNOSIS — J90 PLEURAL EFFUSION: Primary | ICD-10-CM

## 2023-03-02 DIAGNOSIS — R53.1 GENERALIZED WEAKNESS: ICD-10-CM

## 2023-03-02 DIAGNOSIS — F03.90 DEMENTIA WITHOUT BEHAVIORAL DISTURBANCE, PSYCHOTIC DISTURBANCE, MOOD DISTURBANCE, OR ANXIETY, UNSPECIFIED DEMENTIA SEVERITY, UNSPECIFIED DEMENTIA TYPE (HCC): ICD-10-CM

## 2023-03-02 DIAGNOSIS — J18.9 PNEUMONIA OF LEFT LOWER LOBE DUE TO INFECTIOUS ORGANISM: ICD-10-CM

## 2023-03-02 PROBLEM — I10 ESSENTIAL HYPERTENSION: Status: ACTIVE | Noted: 2019-02-04

## 2023-03-02 PROBLEM — R13.10 DYSPHAGIA: Status: ACTIVE | Noted: 2023-03-02

## 2023-03-02 PROBLEM — A41.9 SEPSIS (HCC): Status: ACTIVE | Noted: 2023-03-02

## 2023-03-02 PROBLEM — I48.0 PAROXYSMAL ATRIAL FIBRILLATION (HCC): Status: ACTIVE | Noted: 2019-02-04

## 2023-03-02 PROBLEM — F03.918 DEMENTIA WITH BEHAVIORAL DISTURBANCE: Status: ACTIVE | Noted: 2023-03-02

## 2023-03-02 LAB
ALBUMIN SERPL-MCNC: 3.3 G/DL (ref 3.2–4.6)
ALBUMIN/GLOB SERPL: 0.8 (ref 0.4–1.6)
ALP SERPL-CCNC: 104 U/L (ref 50–136)
ALT SERPL-CCNC: 27 U/L (ref 12–65)
ANION GAP SERPL CALC-SCNC: 6 MMOL/L (ref 2–11)
APPEARANCE UR: CLEAR
AST SERPL-CCNC: 19 U/L (ref 15–37)
BACTERIA URNS QL MICRO: 0 /HPF
BASOPHILS # BLD: 0 K/UL (ref 0–0.2)
BASOPHILS NFR BLD: 0 % (ref 0–2)
BILIRUB SERPL-MCNC: 1.2 MG/DL (ref 0.2–1.1)
BILIRUB UR QL: NEGATIVE
BUN SERPL-MCNC: 18 MG/DL (ref 8–23)
CALCIUM SERPL-MCNC: 9.6 MG/DL (ref 8.3–10.4)
CHLORIDE SERPL-SCNC: 104 MMOL/L (ref 101–110)
CO2 SERPL-SCNC: 26 MMOL/L (ref 21–32)
COLOR UR: ABNORMAL
CREAT SERPL-MCNC: 0.92 MG/DL (ref 0.6–1)
DIFFERENTIAL METHOD BLD: ABNORMAL
EKG ATRIAL RATE: 81 BPM
EKG DIAGNOSIS: ABNORMAL
EKG P AXIS: 51 DEGREES
EKG P-R INTERVAL: 163 MS
EKG Q-T INTERVAL: 488 MS
EKG QRS DURATION: 76 MS
EKG QTC CALCULATION (BAZETT): 556 MS
EKG R AXIS: 10 DEGREES
EKG T AXIS: 29 DEGREES
EKG VENTRICULAR RATE: 78 BPM
EOSINOPHIL # BLD: 0.1 K/UL (ref 0–0.8)
EOSINOPHIL NFR BLD: 1 % (ref 0.5–7.8)
EPI CELLS #/AREA URNS HPF: ABNORMAL /HPF
ERYTHROCYTE [DISTWIDTH] IN BLOOD BY AUTOMATED COUNT: 12.9 % (ref 11.9–14.6)
GLOBULIN SER CALC-MCNC: 3.9 G/DL (ref 2.8–4.5)
GLUCOSE BLD STRIP.AUTO-MCNC: 124 MG/DL (ref 65–100)
GLUCOSE BLD-MCNC: 124 MG/DL
GLUCOSE SERPL-MCNC: 136 MG/DL (ref 65–100)
GLUCOSE UR STRIP.AUTO-MCNC: NEGATIVE MG/DL
HCT VFR BLD AUTO: 39.2 % (ref 35.8–46.3)
HGB BLD-MCNC: 13.5 G/DL (ref 11.7–15.4)
HGB UR QL STRIP: NEGATIVE
IMM GRANULOCYTES # BLD AUTO: 0.2 K/UL (ref 0–0.5)
IMM GRANULOCYTES NFR BLD AUTO: 1 % (ref 0–5)
INR BLD: 1.5 (ref 0.9–1.2)
INR PPP: 1.3
KETONES UR QL STRIP.AUTO: NEGATIVE MG/DL
LACTATE SERPL-SCNC: 1.3 MMOL/L (ref 0.4–2)
LACTATE SERPL-SCNC: 2.2 MMOL/L (ref 0.4–2)
LEUKOCYTE ESTERASE UR QL STRIP.AUTO: NEGATIVE
LYMPHOCYTES # BLD: 0.9 K/UL (ref 0.5–4.6)
LYMPHOCYTES NFR BLD: 5 % (ref 13–44)
MCH RBC QN AUTO: 32.8 PG (ref 26.1–32.9)
MCHC RBC AUTO-ENTMCNC: 34.4 G/DL (ref 31.4–35)
MCV RBC AUTO: 95.1 FL (ref 82–102)
MONOCYTES # BLD: 1.2 K/UL (ref 0.1–1.3)
MONOCYTES NFR BLD: 7 % (ref 4–12)
NEUTS SEG # BLD: 15.9 K/UL (ref 1.7–8.2)
NEUTS SEG NFR BLD: 87 % (ref 43–78)
NITRITE UR QL STRIP.AUTO: NEGATIVE
NRBC # BLD: 0 K/UL (ref 0–0.2)
PH UR STRIP: 6 (ref 5–9)
PLATELET # BLD AUTO: 240 K/UL (ref 150–450)
PMV BLD AUTO: 11.1 FL (ref 9.4–12.3)
POTASSIUM SERPL-SCNC: 3.8 MMOL/L (ref 3.5–5.1)
PROT SERPL-MCNC: 7.2 G/DL (ref 6.3–8.2)
PROT UR STRIP-MCNC: 30 MG/DL
PROTHROMBIN TIME: 16.4 SEC (ref 12.6–14.3)
PT BLD: 17.1 SECS (ref 9.6–11.6)
RBC # BLD AUTO: 4.12 M/UL (ref 4.05–5.2)
RBC #/AREA URNS HPF: ABNORMAL /HPF
SARS-COV-2 RDRP RESP QL NAA+PROBE: NOT DETECTED
SERVICE CMNT-IMP: ABNORMAL
SODIUM SERPL-SCNC: 136 MMOL/L (ref 133–143)
SOURCE: NORMAL
SP GR UR REFRACTOMETRY: 1.03 (ref 1–1.02)
TROPONIN I SERPL HS-MCNC: 18.5 PG/ML (ref 0–14)
UROBILINOGEN UR QL STRIP.AUTO: 1 EU/DL (ref 0.2–1)
WBC # BLD AUTO: 18.3 K/UL (ref 4.3–11.1)
WBC URNS QL MICRO: ABNORMAL /HPF

## 2023-03-02 PROCEDURE — 83605 ASSAY OF LACTIC ACID: CPT

## 2023-03-02 PROCEDURE — 6370000000 HC RX 637 (ALT 250 FOR IP): Performed by: EMERGENCY MEDICINE

## 2023-03-02 PROCEDURE — 2580000003 HC RX 258: Performed by: INTERNAL MEDICINE

## 2023-03-02 PROCEDURE — 82962 GLUCOSE BLOOD TEST: CPT

## 2023-03-02 PROCEDURE — 80053 COMPREHEN METABOLIC PANEL: CPT

## 2023-03-02 PROCEDURE — 99285 EMERGENCY DEPT VISIT HI MDM: CPT | Performed by: EMERGENCY MEDICINE

## 2023-03-02 PROCEDURE — 6360000002 HC RX W HCPCS: Performed by: EMERGENCY MEDICINE

## 2023-03-02 PROCEDURE — 84484 ASSAY OF TROPONIN QUANT: CPT

## 2023-03-02 PROCEDURE — 85610 PROTHROMBIN TIME: CPT

## 2023-03-02 PROCEDURE — 87040 BLOOD CULTURE FOR BACTERIA: CPT

## 2023-03-02 PROCEDURE — 96361 HYDRATE IV INFUSION ADD-ON: CPT | Performed by: EMERGENCY MEDICINE

## 2023-03-02 PROCEDURE — 2580000003 HC RX 258: Performed by: EMERGENCY MEDICINE

## 2023-03-02 PROCEDURE — 1100000000 HC RM PRIVATE

## 2023-03-02 PROCEDURE — 81001 URINALYSIS AUTO W/SCOPE: CPT

## 2023-03-02 PROCEDURE — 87635 SARS-COV-2 COVID-19 AMP PRB: CPT

## 2023-03-02 PROCEDURE — 96374 THER/PROPH/DIAG INJ IV PUSH: CPT | Performed by: EMERGENCY MEDICINE

## 2023-03-02 PROCEDURE — 85025 COMPLETE CBC W/AUTO DIFF WBC: CPT

## 2023-03-02 PROCEDURE — 51701 INSERT BLADDER CATHETER: CPT | Performed by: EMERGENCY MEDICINE

## 2023-03-02 PROCEDURE — 71045 X-RAY EXAM CHEST 1 VIEW: CPT

## 2023-03-02 PROCEDURE — 93005 ELECTROCARDIOGRAM TRACING: CPT | Performed by: EMERGENCY MEDICINE

## 2023-03-02 PROCEDURE — 70450 CT HEAD/BRAIN W/O DYE: CPT

## 2023-03-02 PROCEDURE — 99232 SBSQ HOSP IP/OBS MODERATE 35: CPT | Performed by: PSYCHIATRY & NEUROLOGY

## 2023-03-02 PROCEDURE — 6370000000 HC RX 637 (ALT 250 FOR IP): Performed by: INTERNAL MEDICINE

## 2023-03-02 PROCEDURE — 87086 URINE CULTURE/COLONY COUNT: CPT

## 2023-03-02 RX ORDER — ONDANSETRON 4 MG/1
4 TABLET, ORALLY DISINTEGRATING ORAL EVERY 8 HOURS PRN
Status: DISCONTINUED | OUTPATIENT
Start: 2023-03-02 | End: 2023-03-09 | Stop reason: HOSPADM

## 2023-03-02 RX ORDER — GUAIFENESIN 600 MG/1
600 TABLET, EXTENDED RELEASE ORAL 2 TIMES DAILY
Status: DISCONTINUED | OUTPATIENT
Start: 2023-03-02 | End: 2023-03-07

## 2023-03-02 RX ORDER — QUETIAPINE FUMARATE 25 MG/1
25 TABLET, FILM COATED ORAL NIGHTLY
Status: DISCONTINUED | OUTPATIENT
Start: 2023-03-02 | End: 2023-03-09 | Stop reason: HOSPADM

## 2023-03-02 RX ORDER — LANOLIN ALCOHOL/MO/W.PET/CERES
1000 CREAM (GRAM) TOPICAL DAILY
Status: DISCONTINUED | OUTPATIENT
Start: 2023-03-03 | End: 2023-03-09 | Stop reason: HOSPADM

## 2023-03-02 RX ORDER — 0.9 % SODIUM CHLORIDE 0.9 %
1000 INTRAVENOUS SOLUTION INTRAVENOUS ONCE
Status: COMPLETED | OUTPATIENT
Start: 2023-03-02 | End: 2023-03-02

## 2023-03-02 RX ORDER — LABETALOL HYDROCHLORIDE 5 MG/ML
10 INJECTION, SOLUTION INTRAVENOUS EVERY 10 MIN PRN
Status: DISCONTINUED | OUTPATIENT
Start: 2023-03-02 | End: 2023-03-09 | Stop reason: HOSPADM

## 2023-03-02 RX ORDER — SODIUM CHLORIDE 9 MG/ML
INJECTION, SOLUTION INTRAVENOUS PRN
Status: DISCONTINUED | OUTPATIENT
Start: 2023-03-02 | End: 2023-03-09 | Stop reason: HOSPADM

## 2023-03-02 RX ORDER — ATORVASTATIN CALCIUM 40 MG/1
80 TABLET, FILM COATED ORAL NIGHTLY
Status: DISCONTINUED | OUTPATIENT
Start: 2023-03-02 | End: 2023-03-09 | Stop reason: HOSPADM

## 2023-03-02 RX ORDER — DONEPEZIL HYDROCHLORIDE 5 MG/1
10 TABLET, FILM COATED ORAL NIGHTLY
Status: DISCONTINUED | OUTPATIENT
Start: 2023-03-02 | End: 2023-03-09 | Stop reason: HOSPADM

## 2023-03-02 RX ORDER — ONDANSETRON 2 MG/ML
4 INJECTION INTRAMUSCULAR; INTRAVENOUS EVERY 6 HOURS PRN
Status: DISCONTINUED | OUTPATIENT
Start: 2023-03-02 | End: 2023-03-09 | Stop reason: HOSPADM

## 2023-03-02 RX ORDER — SODIUM CHLORIDE 0.9 % (FLUSH) 0.9 %
5-40 SYRINGE (ML) INJECTION PRN
Status: DISCONTINUED | OUTPATIENT
Start: 2023-03-02 | End: 2023-03-09 | Stop reason: HOSPADM

## 2023-03-02 RX ORDER — ATORVASTATIN CALCIUM 40 MG/1
40 TABLET, FILM COATED ORAL DAILY
Status: DISCONTINUED | OUTPATIENT
Start: 2023-03-02 | End: 2023-03-02

## 2023-03-02 RX ORDER — SODIUM CHLORIDE 9 MG/ML
INJECTION, SOLUTION INTRAVENOUS CONTINUOUS
Status: DISCONTINUED | OUTPATIENT
Start: 2023-03-02 | End: 2023-03-08

## 2023-03-02 RX ORDER — MAGNESIUM HYDROXIDE/ALUMINUM HYDROXICE/SIMETHICONE 120; 1200; 1200 MG/30ML; MG/30ML; MG/30ML
30 SUSPENSION ORAL EVERY 6 HOURS PRN
Status: DISCONTINUED | OUTPATIENT
Start: 2023-03-02 | End: 2023-03-09 | Stop reason: HOSPADM

## 2023-03-02 RX ORDER — BISACODYL 10 MG
10 SUPPOSITORY, RECTAL RECTAL DAILY PRN
Status: DISCONTINUED | OUTPATIENT
Start: 2023-03-02 | End: 2023-03-09 | Stop reason: HOSPADM

## 2023-03-02 RX ORDER — SODIUM CHLORIDE 0.9 % (FLUSH) 0.9 %
5-40 SYRINGE (ML) INJECTION EVERY 12 HOURS SCHEDULED
Status: DISCONTINUED | OUTPATIENT
Start: 2023-03-02 | End: 2023-03-09 | Stop reason: HOSPADM

## 2023-03-02 RX ORDER — ACETAMINOPHEN 500 MG
1000 TABLET ORAL
Status: COMPLETED | OUTPATIENT
Start: 2023-03-02 | End: 2023-03-02

## 2023-03-02 RX ORDER — ASPIRIN 81 MG/1
81 TABLET ORAL DAILY
Status: DISCONTINUED | OUTPATIENT
Start: 2023-03-02 | End: 2023-03-02

## 2023-03-02 RX ORDER — ASPIRIN 300 MG/1
300 SUPPOSITORY RECTAL DAILY
Status: DISCONTINUED | OUTPATIENT
Start: 2023-03-02 | End: 2023-03-02

## 2023-03-02 RX ORDER — ALBUTEROL SULFATE 2.5 MG/3ML
2.5 SOLUTION RESPIRATORY (INHALATION) EVERY 6 HOURS PRN
OUTPATIENT
Start: 2023-03-02

## 2023-03-02 RX ORDER — ACETAMINOPHEN 650 MG/1
650 SUPPOSITORY RECTAL EVERY 6 HOURS PRN
Status: DISCONTINUED | OUTPATIENT
Start: 2023-03-02 | End: 2023-03-02

## 2023-03-02 RX ORDER — FLUTICASONE PROPIONATE 50 MCG
1 SPRAY, SUSPENSION (ML) NASAL DAILY PRN
Status: DISCONTINUED | OUTPATIENT
Start: 2023-03-02 | End: 2023-03-09 | Stop reason: HOSPADM

## 2023-03-02 RX ORDER — ACETAMINOPHEN 650 MG/1
650 SUPPOSITORY RECTAL EVERY 4 HOURS PRN
Status: DISCONTINUED | OUTPATIENT
Start: 2023-03-02 | End: 2023-03-09 | Stop reason: HOSPADM

## 2023-03-02 RX ORDER — POLYETHYLENE GLYCOL 3350 17 G/17G
17 POWDER, FOR SOLUTION ORAL DAILY PRN
Status: DISCONTINUED | OUTPATIENT
Start: 2023-03-02 | End: 2023-03-09 | Stop reason: HOSPADM

## 2023-03-02 RX ORDER — LISINOPRIL 20 MG/1
20 TABLET ORAL DAILY
Status: DISCONTINUED | OUTPATIENT
Start: 2023-03-03 | End: 2023-03-09 | Stop reason: HOSPADM

## 2023-03-02 RX ORDER — ACETAMINOPHEN 325 MG/1
650 TABLET ORAL EVERY 4 HOURS PRN
Status: DISCONTINUED | OUTPATIENT
Start: 2023-03-02 | End: 2023-03-09 | Stop reason: HOSPADM

## 2023-03-02 RX ORDER — ACETAMINOPHEN 325 MG/1
650 TABLET ORAL EVERY 6 HOURS PRN
Status: DISCONTINUED | OUTPATIENT
Start: 2023-03-02 | End: 2023-03-02

## 2023-03-02 RX ADMIN — AZITHROMYCIN MONOHYDRATE 500 MG: 500 INJECTION, POWDER, LYOPHILIZED, FOR SOLUTION INTRAVENOUS at 19:00

## 2023-03-02 RX ADMIN — SODIUM CHLORIDE: 9 INJECTION, SOLUTION INTRAVENOUS at 18:30

## 2023-03-02 RX ADMIN — DONEPEZIL HYDROCHLORIDE 10 MG: 5 TABLET ORAL at 21:26

## 2023-03-02 RX ADMIN — QUETIAPINE FUMARATE 25 MG: 25 TABLET ORAL at 21:26

## 2023-03-02 RX ADMIN — SODIUM CHLORIDE: 9 INJECTION, SOLUTION INTRAVENOUS at 21:10

## 2023-03-02 RX ADMIN — CEFTRIAXONE 1000 MG: 1 INJECTION, POWDER, FOR SOLUTION INTRAMUSCULAR; INTRAVENOUS at 18:12

## 2023-03-02 RX ADMIN — ACETAMINOPHEN 1000 MG: 500 TABLET, FILM COATED ORAL at 17:36

## 2023-03-02 RX ADMIN — SODIUM CHLORIDE 1000 ML: 9 INJECTION, SOLUTION INTRAVENOUS at 17:36

## 2023-03-02 RX ADMIN — GUAIFENESIN 600 MG: 600 TABLET, EXTENDED RELEASE ORAL at 21:26

## 2023-03-02 ASSESSMENT — PAIN SCALES - GENERAL
PAINLEVEL_OUTOF10: 0
PAINLEVEL_OUTOF10: 0

## 2023-03-02 NOTE — ED PROVIDER NOTES
Emergency Department Provider Note                   PCP: Radha Correia MD               Age: 80 y.o. Sex: female     DISPOSITION       No diagnosis found. MEDICAL DECISION MAKING  Complexity of Problems Addressed:  2 or more acute illnesses which pose threat to life. #1 patient has a dense consolidated left lower lobe pneumonia causing her fever, and #2 there is concern for a stroke based on perceived left facial droop by family and inability to walk. A Code S was called    Data Reviewed and Analyzed:  Category 1:   I reviewed records from an external source: provider visit notes from PCP. I ordered each unique test.  I reviewed the results of each unique test.    The patients assessment required an independent historian: Son and daughter interviewed at the bedside as independent historians to give details as patient is not able to answer many questions for self    Category 2:   ED EKG was independently interpreted in the absence of a cardiologist.  Rate: 81 bpm  EKG Interpretation: EKG Interpretation: sinus rhythm  ST Segments: Nonspecific ST segments - NO STEMI  I independently ordered and reviewed the EKG. I independently ordered and reviewed the X-rays. Blood work from the laboratory  I independently ordered and reviewed the CT Scan. Cranial CT shows no hemorrhage or acute stroke  I independently ordered and reviewed the lab work as below    Category 3: Discussion of management or test interpretation.   Lab work reveals a white count of 18.3 with a lactic acid of 2.2 supporting suspicion of sepsis and infection from the pneumonia, urinalysis is negative, chemistry panel looks okay and there is a minimal bump in her troponin, no ACS on EKG    Patient was brought in for stroke concerns however striking feature upon arrival was the fever, no facial droop was perceived, however patient had great difficulty complying with instructions as to pronator drift, finger-nose, and other neurological exams. A Code S was called and CT scan ordered with neurology consult who happened to be her own personal neurologist who follows her for dementia consensus is that this is weakness and confusion due to sepsis and not stroke patient would not be a candidate for reperfusion procedures, and is out of the timeframe for tPA  Patient will be admitted to the hospitalist service on broad-spectrum antibiotics to cover for pneumonia         Risk of Complications and/or Morbidity of Patient Management:  Patient was admitted I have communication with admitting physician and Considerations: The following items were considered but not ordered: MRI of the brain      Is this patient to be included in the SEP-1 core measure due to severe sepsis or septic shock? Yes SEP-1 CORE MEASURE DATA      Sepsis Criteria   Severe Sepsis Criteria   Septic Shock Criteria       Must meet 2:    [x]Temp >100.9 F (38.3 C) or < 96.8 F (36 C)  []HR > 90  []RR > 20  [x]WBC > 12 or < 4 or 10% bands    AND:    [x] Infection Confirmed or Suspected.      Must meet 1:    [x]Lactate > 2       or   [x]Signs of Organ Dysfunction:    - SBP < 90 or MAP < 65  -Creatinine > 2 or increased from baseline  -Urine Output < 0.5 ml/kg/hr  -Bilirubin > 2  -INR > 1.5 (not anticoagulated)  -Platelets < 194,652  -Acute Respiratory Failure as evidenced by new need for NIPPV or mechanical ventilation   Must meet 1:    []Lactate > 4        or   []SBP < 90 or MAP < 65 for at least two readings in the first hour after fluid bolus administration    []Vasopressors initiated (if hypotension persists after fluid resuscitation)   Patient Vitals for the past 6 hrs:   BP Temp Pulse Resp SpO2   03/03/23 0802 (!) 126/55 99 °F (37.2 °C) 78 18 92 %      Recent Labs     03/02/23  1623 03/02/23  1625 03/03/23  0619   WBC 18.3*  --  13.3*   CREATININE 0.92  --  0.53*   BILITOT 1.2*  --  0.9   INR 1.3 1.5*  --      --  175        Infection (sepsis)  related to pneumonia (fill in source of infection) pneumonia identified date: 3/02/23 time: 17:25    Fluid Resuscitation Rationale: less than 30ml/kg because she is not in severe sepsis or septic shock. Instead, 1 L of crystalloid was ordered. Repeat lactate level: ordered and pending at this time    Reassessment Exam: Not applicable. Patient does not have septic shock. Claus Martínez is a 80 y.o. female who presents to the Emergency Department with chief complaint of    Chief Complaint   Patient presents with    Facial Droop      41-year-old lady brought in by her 2 adult children for concerns of possible stroke, she seemed okay around 5 or 10 this morning when her son checked on her she was still in bed laying on her left side, there is no opportunity to really directly assess for facial symmetry, however when she finally awoke around 130 this afternoon and sat up, it was felt that she had a left-sided facial droop, in addition patient was not really able to stand or walk under her own power so EMS was summoned. Patient has a history of atrial fibrillation currently on anticoagulation. Son noticed her to feel hot and checked her temperature and found it to be around 101       Review of Systems   Unable to perform ROS: Dementia   Constitutional:  Positive for activity change and fever. Negative for chills. HENT:  Negative for facial swelling and rhinorrhea. Eyes:  Negative for discharge and redness. Respiratory:  Positive for cough. Negative for shortness of breath. Cardiovascular:  Negative for chest pain and palpitations. Gastrointestinal:  Negative for abdominal pain, nausea and vomiting. Genitourinary:  Negative for difficulty urinating and dysuria. Musculoskeletal:  Positive for gait problem. Negative for arthralgias and back pain. Skin:  Negative for color change and pallor. Psychiatric/Behavioral:  Positive for confusion and decreased concentration.     All other systems reviewed and are negative. Vitals signs and nursing note reviewed. Patient Vitals for the past 4 hrs:   Temp Pulse Resp BP SpO2   03/02/23 1607 (!) 101.1 °F (38.4 °C) 79 19 119/62 95 %          Physical Exam  Vitals and nursing note reviewed. Constitutional:       General: She is not in acute distress. Appearance: Normal appearance. She is not ill-appearing, toxic-appearing or diaphoretic. HENT:      Head: Normocephalic and atraumatic. Right Ear: External ear normal.      Left Ear: External ear normal.      Nose: Nose normal. No rhinorrhea. Eyes:      General: No scleral icterus. Right eye: No discharge. Left eye: No discharge. Extraocular Movements: Extraocular movements intact. Conjunctiva/sclera: Conjunctivae normal.   Cardiovascular:      Rate and Rhythm: Normal rate and regular rhythm. Pulmonary:      Effort: Pulmonary effort is normal. No respiratory distress. Breath sounds: Examination of the left-middle field reveals decreased breath sounds and rales. Examination of the left-lower field reveals rales. Decreased breath sounds and rales present. Musculoskeletal:         General: No deformity or signs of injury. Normal range of motion. Cervical back: Normal range of motion and neck supple. No rigidity. Skin:     General: Skin is warm and dry. Coloration: Skin is not jaundiced or pale. Neurological:      General: No focal deficit present. Mental Status: She is alert. She is disoriented.       Comments: No facial droop appreciated, between her acute illness from the pneumonia and dementia patient not really able to comply with muscle testing, pronator drift, finger-nose, or really any instructions other than lifting each leg individually off of the bed which she does well, but after several failed attempts each        EKG 12 Lead    Date/Time: 3/2/2023 11:43 AM  Performed by: Chyna Lawrence MD  Authorized by: Chyna Lawrence MD     ECG reviewed by ED Physician in the absence of a cardiologist: yes    Interpretation:     Interpretation: abnormal    Rate:     ECG rate:  81    ECG rate assessment: normal    Rhythm:     Rhythm: sinus rhythm    Ectopy:     Ectopy: PAC    QRS:     QRS axis:  Normal    QRS intervals:  Normal    QRS conduction: normal    ST segments:     ST segments:  Non-specific  T waves:     T waves: non-specific    Other findings:     Other findings: prolonged qTc interval            No orders of the defined types were placed in this encounter.        Medications - No data to display    New Prescriptions    No medications on file        Past Medical History:   Diagnosis Date    Atrial fibrillation ([facility] [address]     Dementia ([facility] [address]     Depression     Hypercholesterolemia     Hypertension     IBS (irritable bowel syndrome)         Past Surgical History:   Procedure Laterality Date    BREAST BIOPSY Left 2014    BREAST SURGERY      bilat implant    BREAST SURGERY  2013    lumpectomy    COLONOSCOPY  06/2018     no polyps    COLONOSCOPY  2012    in [place] (repeat in 10 years)    HERNIA REPAIR  2012    inguinal    IMPLANT BREAST SILICONE/EQ Bilateral     35 yrs ago    ORTHOPEDIC SURGERY Left     bunion    OTHER SURGICAL HISTORY      face lift    UPPER GASTROINTESTINAL ENDOSCOPY  06/25/2018        Family History   Problem Relation Age of Onset    Dementia Sister     Hypertension Mother     Cancer Sister 78        pancreatic    Hypertension Father         Social History     Socioeconomic History    Marital status:      Spouse name: None    Number of children: None    Years of education: None    Highest education level: None   Tobacco Use    Smoking status: Never    Smokeless tobacco: Never   Vaping Use    Vaping Use: Never used   Substance and Sexual Activity    Alcohol use: Never    Drug use: Never    Sexual activity: Not Currently     Social Determinants of Health     Physical Activity: Sufficiently Active    Days of Exercise per Week: 5 days    Minutes of Exercise per Session: 40 min        Allergies: Patient has no known allergies. Previous Medications    ATORVASTATIN (LIPITOR) 40 MG TABLET    TAKE 1 TABLET EVERY DAY    CYANOCOBALAMIN 1000 MCG TABLET    Take 1,000 mcg by mouth daily    DONEPEZIL (ARICEPT) 10 MG TABLET    Take 1 tablet by mouth nightly    FLUTICASONE (FLONASE) 50 MCG/ACT NASAL SPRAY    1 spray by Each Nostril route daily    LEVOCETIRIZINE DIHYDROCHLORIDE (XYZAL ALLERGY 24HR PO)    Take by mouth daily    LISINOPRIL (PRINIVIL;ZESTRIL) 20 MG TABLET    TAKE 1 TABLET EVERY DAY    MULTIPLE VITAMINS-MINERALS (MULTIVITAMIN ADULTS PO)    Take by mouth daily    QUETIAPINE (SEROQUEL) 25 MG TABLET    Take 1 tablet by mouth at bedtime    RIVAROXABAN (XARELTO) 20 MG TABS TABLET    Take 1 tablet by mouth daily (with breakfast)    VERAPAMIL (VERELAN) 240 MG EXTENDED RELEASE CAPSULE    Take 1 capsule by mouth nightly 1 every day        No results found for any visits on 03/02/23. No orders to display                     Voice dictation software was used during the making of this note. This software is not perfect and grammatical and other typographical errors may be present. This note has not been completely proofread for errors.      Lafrances Skiff, MD  03/03/23 3782       Lafrances Skiff, MD  03/03/23 0573

## 2023-03-02 NOTE — TELEPHONE ENCOUNTER
I called and spoke with Micheal Curran, child, she is going to get the forms and fill out all that she can. She will then bring the forms to us and we will get the rest filled and Dr Dk Torres to sign it.

## 2023-03-02 NOTE — CONSULTS
Consult    Patient: Chandana Mccain MRN: [id number]     YOB: 1940  Age: 80 y.o. Sex: female      Subjective: Chandana Mccain is a 80 y.o. female who is being seen for a code stroke for confusion and possible facial droop on the left    The patient is known to me. I followed her in clinic for dementia. Her family reports a left-sided facial droop since waking up today around noon. In addition she has had fatigue, fever, and flank pain. She takes Xarelto for atrial fibrillation. A code stroke was called at 4:20 PM.  Neurology arrived at bedside at 4:23 PM.  NIH stroke scale is as below. CT scan of the head is being done now.       Past Medical History:   Diagnosis Date    Atrial fibrillation ([facility] [address]     Dementia ([facility] [address]     Depression     Hypercholesterolemia     Hypertension     IBS (irritable bowel syndrome)      Past Surgical History:   Procedure Laterality Date    BREAST BIOPSY Left 2014    BREAST SURGERY      bilat implant    BREAST SURGERY  2013    lumpectomy    COLONOSCOPY  06/2018     no polyps    COLONOSCOPY  2012    in [place] (repeat in 10 years)    HERNIA REPAIR  2012    inguinal    IMPLANT BREAST SILICONE/EQ Bilateral     35 yrs ago    ORTHOPEDIC SURGERY Left     bunion    OTHER SURGICAL HISTORY      face lift    UPPER GASTROINTESTINAL ENDOSCOPY  06/25/2018      Family History   Problem Relation Age of Onset    Dementia Sister     Hypertension Mother     Cancer Sister 78        pancreatic    Hypertension Father      Social History     Tobacco Use    Smoking status: Never    Smokeless tobacco: Never   Substance Use Topics    Alcohol use: Never      Current Facility-Administered Medications   Medication Dose Route Frequency Provider Last Rate Last Admin    acetaminophen (TYLENOL) tablet 1,000 mg  1,000 mg Oral NOW Iftikhar Garcia MD        0.9 % sodium chloride bolus  1,000 mL IntraVENous Once Iftikhar Garcia MD         Current Outpatient Medications   Medication Sig Dispense Refill    atorvastatin (LIPITOR) 40 MG tablet TAKE 1 TABLET EVERY DAY 90 tablet 3    lisinopril (PRINIVIL;ZESTRIL) 20 MG tablet TAKE 1 TABLET EVERY DAY 90 tablet 3    Levocetirizine Dihydrochloride (XYZAL ALLERGY 24HR PO) Take by mouth daily      fluticasone (FLONASE) 50 MCG/ACT nasal spray 1 spray by Each Nostril route daily      Multiple Vitamins-Minerals (MULTIVITAMIN ADULTS PO) Take by mouth daily      verapamil (VERELAN) 240 MG extended release capsule Take 1 capsule by mouth nightly 1 every day 90 capsule 3    rivaroxaban (XARELTO) 20 MG TABS tablet Take 1 tablet by mouth daily (with breakfast) 90 tablet 3    donepezil (ARICEPT) 10 MG tablet Take 1 tablet by mouth nightly 90 tablet 3    QUEtiapine (SEROQUEL) 25 MG tablet Take 1 tablet by mouth at bedtime 90 tablet 1    cyanocobalamin 1000 MCG tablet Take 1,000 mcg by mouth daily          No Known Allergies    Review of Systems:  Could not obtain due to altered mental status        Objective:     Vitals:    03/02/23 1607   BP: 119/62   Pulse: 79   Resp: 19   Temp: (!) 101.1 °F (38.4 °C)   TempSrc: Oral   SpO2: 95%   Weight: 89 lb (40.4 kg)   Height: 4' 10\" (1.473 m)        Physical Exam:  General - Well developed, well nourished, in no apparent distress. Pleasant and conversant but confused  HEENT - Normocephalic, atraumatic. Neck -No masses   Lungs - Normal work of breathing   Abdomen - No masses   Extremities - No edema and no rashes.    Psychiatric - Mood and affect are normal        NIHSS   NIHSS Score: 2  1a-Level of Consciousness 0  1b-What is Month/Age 2 (baseline dementia)  1c-Open/Close Eyes&Hand 0  2 -Best Gaze 0  3 -Visual Fields 0  4 -Facial Palsy 0  5a-Motor-Left Arm 0  5b-Motor-Right Arm 0  6a-Motor-Left Leg 0  6b-Motor-Right Leg 0  7 -Limb Ataxia 0  8 -Sensory 0  9 -Best Language 0  10-Dysarthria 0  11-Extinction/Inattention 0    Lab Results   Component Value Date/Time    CHOL 173 12/08/2022 08:50 AM    HDL 80 12/08/2022 08:50 AM    VLDL 18 09/14/2021 08:24 AM          CT head: Pending             Assessment and Plan    60-year-old woman with Alzheimer disease dementia who presented with fever and flank pain. Family noted left facial droop. Overall, my suspicion for stroke is low. I will review CT scan when it is available. She is not a candidate for thrombolytic therapy secondary to being outside the therapeutic window. She is not a candidate for mechanical thrombectomy can [place] to low NIH stroke scale. Again, I suspect her symptoms are secondary to infection. If CT scan of the head is abnormal then I will make further recommendations. Otherwise, continue oral anticoagulation for secondary stroke prevention in the setting of atrial fibrillation.

## 2023-03-02 NOTE — PROGRESS NOTES
Hospitalist History and Physical   Admit Date:  3/2/2023  4:09 PM   Name:  Tommy Woo   Age:  80 y.o. Sex:  female  :  1940   MRN:  [id number]   Room:  ER08/    Presenting Complaint: Facial Droop     Reason(s) for Admission: Sepsis ([facility] [A41.9]     History of Present Illness:   Tommy Woo is a 80 y.o. female with medical history of paroxysmal atrial fibrillation on Xarelto and verapamil rate control, hypertension and dementia with behavioral abnormalities on Seroquel nocturnally. She has been visiting her  in the hospital with a hernia recently and has had fatigue and is staying with family. Family reports that patient was difficult to arouse this morning weak and could not stand. Noticed increased temp complained of left shoulder plain and family felt possible left-sided weakness and possible left facial droop. Droop not felt to be significant by ER or neurology and initial work-up for code stroke negative. Patient found to have sepsis with fever leukocytosis and a new left lower lobe pneumonia. Neurology feels symptoms likely related to her baseline dementia with acute infectious process. Patient after receiving IV fluids somewhat more alert conversant but alert to person only. Pleasant and cooperative and maintains social graces. Discussed with daughter and son at bedside and patient to be admitted and will honor DO NOT RESUSCITATE status. Patient has had outpatient palliative care consultations. Patient does have behavioral abnormality with her dementia with nocturnal confusion and will continue her nocturnal Seroquel but family expects she could have aggravation of behavioral abnormalities with change in environment during hospitalization. Discussed planned antibiotics supportive care and continued home meds including anticoagulation and rate control regarding atrial fibrillation. No dysuria. Serum creatinine okay. Lactic acid pending.   A-fib apparently paroxysmal and EKG was sinus mechanism noted. Assessment & Plan:     Principal Problem:    Sepsis ([address]  Plan: Secondary to pneumonia. We will utilize sepsis protocol. Risk-benefit of aggressive IV hydration given patient age and status and BP stable. Creatinine normal.  Ceftriaxone and azithromycin. Blood cultures. Further intervention pending initial response and clinical stability    Active Problems:    Left lower lobe pneumonia  Plan: Above antibiotics, mucolytic, as needed bronchodilators. Dementia with behavioral disturbance  Plan: Continue home meds including Aricept and Seroquel. Monitor for behavioral disturbance. Dysphagia  Plan: Speech therapy evaluation. N.p.o. until speech therapy eval.  Meds with sips. Essential hypertension  Plan: Continue home lisinopril. Paroxysmal atrial fibrillation (HCC)  Plan: Continue home verapamil and anticoagulation. Xarelto. PT/OT evals and PPD needed/ordered? Yes  Diet: Diet NPO Exceptions are: Sips of Water with Meds, Sips of Clear Liquids  VTE prophylaxis: Already on anticoagulation  Code status: DNR    Hospital Problems:  Principal Problem:    Sepsis ([address]  Active Problems:    Left lower lobe pneumonia    Dementia with behavioral disturbance    Dysphagia    Essential hypertension    Paroxysmal atrial fibrillation (HCC)  Resolved Problems:    * No resolved hospital problems.  *       Past History:     Past Medical History:   Diagnosis Date    Atrial fibrillation ([address]     Dementia ([address]     Depression     Hypercholesterolemia     Hypertension     IBS (irritable bowel syndrome)        Past Surgical History:   Procedure Laterality Date    BREAST BIOPSY Left 2014    BREAST SURGERY      bilat implant    BREAST SURGERY  2013    lumpectomy    COLONOSCOPY  06/2018     no polyps    COLONOSCOPY  2012    in [place] (repeat in 10 years)    HERNIA REPAIR  2012    inguinal    IMPLANT BREAST SILICONE/EQ Bilateral     35 yrs ago    ORTHOPEDIC SURGERY Left     bunion    OTHER SURGICAL HISTORY      face lift    UPPER GASTROINTESTINAL ENDOSCOPY  06/25/2018        Social History     Tobacco Use    Smoking status: Never    Smokeless tobacco: Never   Substance Use Topics    Alcohol use: Never      Social History     Substance and Sexual Activity   Drug Use Never       Family History   Problem Relation Age of Onset    Dementia Sister     Hypertension Mother     Cancer Sister 78        pancreatic    Hypertension Father         Immunization History   Administered Date(s) Administered    COVID-19, MODERNA BLUE border, Primary or Immunocompromised, (age 12y+), IM, 100 mcg/0.5mL 01/11/2021, 02/11/2021    Pneumococcal Conjugate 13-valent ([id number]) 11/20/2014    Zoster Recombinant (Shingrix) 07/31/2019, 11/19/2019     No Known Allergies  Prior to Admit Medications:  Current Outpatient Medications   Medication Instructions    atorvastatin (LIPITOR) 40 MG tablet TAKE 1 TABLET EVERY DAY    cyanocobalamin 1,000 mcg, Oral, DAILY    donepezil (ARICEPT) 10 mg, Oral, NIGHTLY    fluticasone (FLONASE) 50 MCG/ACT nasal spray 1 spray, Each Nostril, DAILY    Levocetirizine Dihydrochloride (XYZAL ALLERGY 24HR PO) Oral, DAILY    lisinopril (PRINIVIL;ZESTRIL) 20 MG tablet TAKE 1 TABLET EVERY DAY    Multiple Vitamins-Minerals (MULTIVITAMIN ADULTS PO) Oral, DAILY    QUEtiapine (SEROQUEL) 25 mg, Oral, Nightly    rivaroxaban (XARELTO) 20 mg, Oral, DAILY WITH BREAKFAST    verapamil (VERELAN) 240 mg, Oral, NIGHTLY, 1 every day         Objective:   Patient Vitals for the past 24 hrs:   Temp Pulse Resp BP SpO2   03/02/23 1810 -- 77 16 (!) 109/58 --   03/02/23 1800 -- 79 19 (!) 106/57 --   03/02/23 1740 -- 86 24 122/60 95 %   03/02/23 1710 -- 81 17 (!) 111/59 --   03/02/23 1703 -- 78 -- (!) 119/55 95 %   03/02/23 1630 -- 80 19 108/66 93 %   03/02/23 1620 -- -- -- (!) 104/53 --   03/02/23 1607 (!) 101.1 °F (38.4 °C) 79 19 119/62 95 %       Oxygen Therapy  SpO2: 95 %  O2 Device: None (Room air)    Estimated body mass index is 18.6 kg/m² as calculated from the following:    Height as of this encounter: 4' 10\" (1.473 m). Weight as of this encounter: 89 lb (40.4 kg). Intake/Output Summary (Last 24 hours) at 3/2/2023 1839  Last data filed at 3/2/2023 1700  Gross per 24 hour   Intake --   Output 200 ml   Net -200 ml         Physical Exam:    Blood pressure (!) 109/58, pulse 77, temperature (!) 101.1 °F (38.4 °C), temperature source Oral, resp. rate 16, height 4' 10\" (1.473 m), weight 89 lb (40.4 kg), SpO2 95 %. General:    Alert to person only-advanced dementia. Fatigue. Head:  Normocephalic, atraumatic  Eyes:  Sclerae appear normal.  Pupils equally round. ENT:  Nares appear normal.  Moist oral mucosa  Neck:  No restricted ROM. Trachea midline   CV:   Rate controlled and fairly regular at time of exam.  No gross S3 gallop no JVD or HJR  Lungs:   Pulmonary rales left base. Faint rales right side but no obvious wheeze. Abdomen:   Soft, nontender, nondistended. Extremities: No cyanosis or clubbing. No unilateral lower extremity edema  Skin:     No rashes and normal coloration. Warm and dry. Neuro:  CN II-XII grossly intact. Sensation intact. Moves all extremities symmetrically. No past-pointing.   No focal motor deficits     I have personally reviewed labs and tests:  Recent Labs:  Recent Results (from the past 24 hour(s))   POCT Glucose    Collection Time: 03/02/23  4:22 PM   Result Value Ref Range    POC Glucose 124 (H) 65 - 100 mg/dL    Performed by: Nia    CBC with Auto Differential    Collection Time: 03/02/23  4:23 PM   Result Value Ref Range    WBC 18.3 (H) 4.3 - 11.1 K/uL    RBC 4.12 4.05 - 5.2 M/uL    Hemoglobin 13.5 11.7 - 15.4 g/dL    Hematocrit 39.2 35.8 - 46.3 %    MCV 95.1 82.0 - 102.0 FL    MCH 32.8 26.1 - 32.9 PG    MCHC 34.4 31.4 - 35.0 g/dL    RDW 12.9 11.9 - 14.6 %    Platelets 464 774 - 105 K/uL    MPV 11.1 9.4 - 12.3 FL    nRBC 0.00 0.0 - 0.2 K/uL Differential Type AUTOMATED      Seg Neutrophils 87 (H) 43 - 78 %    Lymphocytes 5 (L) 13 - 44 %    Monocytes 7 4.0 - 12.0 %    Eosinophils % 1 0.5 - 7.8 %    Basophils 0 0.0 - 2.0 %    Immature Granulocytes 1 0.0 - 5.0 %    Segs Absolute 15.9 (H) 1.7 - 8.2 K/UL    Absolute Lymph # 0.9 0.5 - 4.6 K/UL    Absolute Mono # 1.2 0.1 - 1.3 K/UL    Absolute Eos # 0.1 0.0 - 0.8 K/UL    Basophils Absolute 0.0 0.0 - 0.2 K/UL    Absolute Immature Granulocyte 0.2 0.0 - 0.5 K/UL   Comprehensive Metabolic Panel w/ Reflex to MG    Collection Time: 03/02/23  4:23 PM   Result Value Ref Range    Sodium 136 133 - 143 mmol/L    Potassium 3.8 3.5 - 5.1 mmol/L    Chloride 104 101 - 110 mmol/L    CO2 26 21 - 32 mmol/L    Anion Gap 6 2 - 11 mmol/L    Glucose 136 (H) 65 - 100 mg/dL    BUN 18 8 - 23 MG/DL    Creatinine 0.92 0.6 - 1.0 MG/DL    Est, Glom Filt Rate >60 >60 ml/min/1.73m2    Calcium 9.6 8.3 - 10.4 MG/DL    Total Bilirubin 1.2 (H) 0.2 - 1.1 MG/DL    ALT 27 12 - 65 U/L    AST 19 15 - 37 U/L    Alk Phosphatase 104 50 - 136 U/L    Total Protein 7.2 6.3 - 8.2 g/dL    Albumin 3.3 3.2 - 4.6 g/dL    Globulin 3.9 2.8 - 4.5 g/dL    Albumin/Globulin Ratio 0.8 0.4 - 1.6     Troponin    Collection Time: 03/02/23  4:23 PM   Result Value Ref Range    Troponin, High Sensitivity 18.5 (H) 0 - 14 pg/mL   Protime-INR    Collection Time: 03/02/23  4:23 PM   Result Value Ref Range    Protime 16.4 (H) 12.6 - 14.3 sec    INR 1.3     POCT Glucose    Collection Time: 03/02/23  4:25 PM   Result Value Ref Range    Glucose 124 mg/dL   POCT INR    Collection Time: 03/02/23  4:25 PM   Result Value Ref Range    POC Protime 17.1 (H) 9.6 - 11.6 SECS    POC INR 1.5 (H) 0.9 - 1.2     Urinalysis    Collection Time: 03/02/23  5:31 PM   Result Value Ref Range    Color, UA YELLOW/STRAW      Appearance CLEAR      Specific Gravity, UA 1.028 (H) 1.001 - 1.023      pH, Urine 6.0 5.0 - 9.0      Protein, UA 30 (A) NEG mg/dL    Glucose, UA Negative mg/dL    Ketones, Urine Negative NEG mg/dL    Bilirubin Urine Negative NEG      Blood, Urine Negative NEG      Urobilinogen, Urine 1.0 0.2 - 1.0 EU/dL    Nitrite, Urine Negative NEG      Leukocyte Esterase, Urine Negative NEG      WBC, UA 0-3 0 /hpf    RBC, UA 0-3 0 /hpf    Epithelial Cells UA 0-3 0 /hpf    BACTERIA, URINE 0 0 /hpf   Lactic Acid    Collection Time: 03/02/23  5:53 PM   Result Value Ref Range    Lactic Acid, Plasma 2.2 (H) 0.4 - 2.0 MMOL/L   COVID-19, Rapid    Collection Time: 03/02/23  5:53 PM    Specimen: Nasopharyngeal   Result Value Ref Range    Source Nasopharyngeal      SARS-CoV-2, Rapid Not detected NOTD         I have personally reviewed imaging studies:  CT HEAD WO CONTRAST    Result Date: 3/2/2023  STUDY: CT CODE NEURO HEAD WO CONTRAST [id number] STUDY DATE: 3/2/2023 4:40 PM HISTORY: stroke like symptoms TECHNIQUE: Contiguous axial CT images were obtained of the head without intravenous contrast. Coronal and sagittal reconstructions were performed. Radiation dose reduction techniques were used for this study: All CT scans performed at this facility use one or all of the following: Automated exposure control, adjustment of the mA and/or kVp according to patient's size, iterative reconstruction. CONTRAST: None. COMPARISON:  June 8, 2021 FINDINGS: HEMORRHAGE: None. BRAIN PARENCHYMA: Grey-white matter differentiation is maintained. WHITE MATTER: Periventricular and subcortical white matter hypoattenuation is again noted, nonspecific, but most likely sequela of chronic microvascular ischemic change. VENTRICLES: Stable diffuse cerebral volume loss. CALVARIUM: No fracture. VASCULATURE: Scattered intracranial and scattered calcifications. GLOBES AND ORBITS: Bilateral cataract extraction. SINUSES: Mild mucosal thickening within the left maxillary sinus. 1.  No acute cranial abnormality. 2.  Stable senescent changes, as above.      XR CHEST PORTABLE    Result Date: 3/2/2023  Chest X-ray INDICATION: Post CVA AP/PA view of the chest was obtained. FINDINGS: There is infiltrate in the lateral left lung base. Right lung is clear. There is mild cardiomegaly. Vascular calcification is present. Left lower lobe pneumonia       Echocardiogram:  No results found for this or any previous visit. Orders Placed This Encounter   Medications    acetaminophen (TYLENOL) tablet 1,000 mg    0.9 % sodium chloride bolus    cefTRIAXone (ROCEPHIN) 1,000 mg in sodium chloride 0.9 % 50 mL IVPB (mini-bag)     Order Specific Question:   Antimicrobial Indications     Answer:   Pneumonia (CAP)    azithromycin (ZITHROMAX) 500 mg in sodium chloride 0.9 % 250 mL IVPB ([id number])     Order Specific Question:   Antimicrobial Indications     Answer:   Pneumonia (CAP)    0.9 % sodium chloride infusion    sodium chloride flush 0.9 % injection 5-40 mL    sodium chloride flush 0.9 % injection 5-40 mL    0.9 % sodium chloride infusion    famotidine (PEPCID) 20 mg in sodium chloride (PF) 0.9 % 10 mL injection    aluminum & magnesium hydroxide-simethicone (MAALOX) 200-200-20 MG/5ML suspension 30 mL    OR Linked Order Group     acetaminophen (TYLENOL) tablet 650 mg     acetaminophen (TYLENOL) suppository 650 mg    AND Linked Order Group     cefTRIAXone (ROCEPHIN) 1,000 mg in sodium chloride 0.9 % 50 mL IVPB (mini-bag)      Order Specific Question:   Antimicrobial Indications      Answer:   Pneumonia (CAP)      Order Specific Question:   CAP duration of therapy      Answer:   7 days     azithromycin (ZITHROMAX) 500 mg in sodium chloride 0.9 % 250 mL IVPB ([id number])      Order Specific Question:   Antimicrobial Indications      Answer:   Pneumonia (CAP)      Order Specific Question:   CAP duration of therapy      Answer:    Other      Order Specific Question:   Other CAP Duration      Answer:   3 days    DISCONTD: atorvastatin (LIPITOR) tablet 40 mg    vitamin B-12 (CYANOCOBALAMIN) tablet 1,000 mcg    donepezil (ARICEPT) tablet 10 mg    fluticasone (FLONASE) 50 MCG/ACT nasal spray 1 spray    lisinopril (PRINIVIL;ZESTRIL) tablet 10 mg    QUEtiapine (SEROQUEL) tablet 25 mg    rivaroxaban (XARELTO) tablet 20 mg     Order Specific Question:   Indication of Use     Answer:   A Fib/A Flutter    verapamil (VERELAN) extended release capsule 240 mg    OR Linked Order Group     acetaminophen (TYLENOL) tablet 650 mg     acetaminophen (TYLENOL) suppository 650 mg    OR Linked Order Group     ondansetron (ZOFRAN-ODT) disintegrating tablet 4 mg     ondansetron (ZOFRAN) injection 4 mg    polyethylene glycol (GLYCOLAX) packet 17 g    bisacodyl (DULCOLAX) suppository 10 mg    DISCONTD: aspirin EC tablet 81 mg    DISCONTD: aspirin suppository 300 mg    atorvastatin (LIPITOR) tablet 80 mg    labetalol (NORMODYNE;TRANDATE) injection 10 mg         Signed:  Ben June DO    Part of this note may have been written by using a voice dictation software.  The note has been proof read but may still contain some grammatical/other typographical errors.

## 2023-03-03 LAB
ALBUMIN SERPL-MCNC: 2.3 G/DL (ref 3.2–4.6)
ALBUMIN/GLOB SERPL: 0.7 (ref 0.4–1.6)
ALP SERPL-CCNC: 81 U/L (ref 50–136)
ALT SERPL-CCNC: 23 U/L (ref 12–65)
ANION GAP SERPL CALC-SCNC: 4 MMOL/L (ref 2–11)
AST SERPL-CCNC: 23 U/L (ref 15–37)
BASOPHILS # BLD: 0 K/UL (ref 0–0.2)
BASOPHILS NFR BLD: 0 % (ref 0–2)
BILIRUB SERPL-MCNC: 0.9 MG/DL (ref 0.2–1.1)
BUN SERPL-MCNC: 15 MG/DL (ref 8–23)
CALCIUM SERPL-MCNC: 8 MG/DL (ref 8.3–10.4)
CHLORIDE SERPL-SCNC: 112 MMOL/L (ref 101–110)
CHOLEST SERPL-MCNC: 111 MG/DL
CO2 SERPL-SCNC: 22 MMOL/L (ref 21–32)
CREAT SERPL-MCNC: 0.53 MG/DL (ref 0.6–1)
DIFFERENTIAL METHOD BLD: ABNORMAL
EOSINOPHIL # BLD: 0 K/UL (ref 0–0.8)
EOSINOPHIL NFR BLD: 0 % (ref 0.5–7.8)
ERYTHROCYTE [DISTWIDTH] IN BLOOD BY AUTOMATED COUNT: 13.2 % (ref 11.9–14.6)
EST. AVERAGE GLUCOSE BLD GHB EST-MCNC: 117 MG/DL
GLOBULIN SER CALC-MCNC: 3.1 G/DL (ref 2.8–4.5)
GLUCOSE SERPL-MCNC: 103 MG/DL (ref 65–100)
HBA1C MFR BLD: 5.7 % (ref 4.8–5.6)
HCT VFR BLD AUTO: 33.5 % (ref 35.8–46.3)
HDLC SERPL-MCNC: 55 MG/DL (ref 40–60)
HDLC SERPL: 2
HGB BLD-MCNC: 11.2 G/DL (ref 11.7–15.4)
IMM GRANULOCYTES # BLD AUTO: 0.1 K/UL (ref 0–0.5)
IMM GRANULOCYTES NFR BLD AUTO: 1 % (ref 0–5)
LDLC SERPL CALC-MCNC: 41.6 MG/DL
LYMPHOCYTES # BLD: 1.5 K/UL (ref 0.5–4.6)
LYMPHOCYTES NFR BLD: 11 % (ref 13–44)
MCH RBC QN AUTO: 32.3 PG (ref 26.1–32.9)
MCHC RBC AUTO-ENTMCNC: 33.4 G/DL (ref 31.4–35)
MCV RBC AUTO: 96.5 FL (ref 82–102)
MONOCYTES # BLD: 0.9 K/UL (ref 0.1–1.3)
MONOCYTES NFR BLD: 7 % (ref 4–12)
NEUTS SEG # BLD: 10.8 K/UL (ref 1.7–8.2)
NEUTS SEG NFR BLD: 81 % (ref 43–78)
NRBC # BLD: 0 K/UL (ref 0–0.2)
PLATELET # BLD AUTO: 175 K/UL (ref 150–450)
PMV BLD AUTO: 11.5 FL (ref 9.4–12.3)
POTASSIUM SERPL-SCNC: 3.6 MMOL/L (ref 3.5–5.1)
PROCALCITONIN SERPL-MCNC: 2.84 NG/ML (ref 0–0.49)
PROT SERPL-MCNC: 5.4 G/DL (ref 6.3–8.2)
RBC # BLD AUTO: 3.47 M/UL (ref 4.05–5.2)
SODIUM SERPL-SCNC: 138 MMOL/L (ref 133–143)
TRIGL SERPL-MCNC: 72 MG/DL (ref 35–150)
VLDLC SERPL CALC-MCNC: 14.4 MG/DL (ref 6–23)
WBC # BLD AUTO: 13.3 K/UL (ref 4.3–11.1)

## 2023-03-03 PROCEDURE — 97165 OT EVAL LOW COMPLEX 30 MIN: CPT

## 2023-03-03 PROCEDURE — 80053 COMPREHEN METABOLIC PANEL: CPT

## 2023-03-03 PROCEDURE — 83036 HEMOGLOBIN GLYCOSYLATED A1C: CPT

## 2023-03-03 PROCEDURE — 94760 N-INVAS EAR/PLS OXIMETRY 1: CPT

## 2023-03-03 PROCEDURE — 92610 EVALUATE SWALLOWING FUNCTION: CPT

## 2023-03-03 PROCEDURE — 2580000003 HC RX 258: Performed by: INTERNAL MEDICINE

## 2023-03-03 PROCEDURE — 2500000003 HC RX 250 WO HCPCS: Performed by: INTERNAL MEDICINE

## 2023-03-03 PROCEDURE — 6360000002 HC RX W HCPCS: Performed by: INTERNAL MEDICINE

## 2023-03-03 PROCEDURE — 6370000000 HC RX 637 (ALT 250 FOR IP): Performed by: INTERNAL MEDICINE

## 2023-03-03 PROCEDURE — 1100000000 HC RM PRIVATE

## 2023-03-03 PROCEDURE — 97530 THERAPEUTIC ACTIVITIES: CPT

## 2023-03-03 PROCEDURE — 97161 PT EVAL LOW COMPLEX 20 MIN: CPT

## 2023-03-03 PROCEDURE — 80061 LIPID PANEL: CPT

## 2023-03-03 PROCEDURE — 36415 COLL VENOUS BLD VENIPUNCTURE: CPT

## 2023-03-03 PROCEDURE — 85025 COMPLETE CBC W/AUTO DIFF WBC: CPT

## 2023-03-03 PROCEDURE — A4216 STERILE WATER/SALINE, 10 ML: HCPCS | Performed by: INTERNAL MEDICINE

## 2023-03-03 PROCEDURE — 84145 PROCALCITONIN (PCT): CPT

## 2023-03-03 PROCEDURE — 97535 SELF CARE MNGMENT TRAINING: CPT

## 2023-03-03 RX ADMIN — RIVAROXABAN 20 MG: 20 TABLET, FILM COATED ORAL at 09:21

## 2023-03-03 RX ADMIN — ACETAMINOPHEN 650 MG: 325 TABLET, FILM COATED ORAL at 19:03

## 2023-03-03 RX ADMIN — AZITHROMYCIN MONOHYDRATE 500 MG: 500 INJECTION, POWDER, LYOPHILIZED, FOR SOLUTION INTRAVENOUS at 19:29

## 2023-03-03 RX ADMIN — GUAIFENESIN 600 MG: 600 TABLET, EXTENDED RELEASE ORAL at 19:31

## 2023-03-03 RX ADMIN — CEFTRIAXONE 1000 MG: 1 INJECTION, POWDER, FOR SOLUTION INTRAMUSCULAR; INTRAVENOUS at 17:46

## 2023-03-03 RX ADMIN — ONDANSETRON 4 MG: 4 TABLET, ORALLY DISINTEGRATING ORAL at 16:47

## 2023-03-03 RX ADMIN — DONEPEZIL HYDROCHLORIDE 10 MG: 5 TABLET ORAL at 19:31

## 2023-03-03 RX ADMIN — ATORVASTATIN CALCIUM 80 MG: 40 TABLET, FILM COATED ORAL at 19:31

## 2023-03-03 RX ADMIN — CYANOCOBALAMIN TAB 1000 MCG 1000 MCG: 1000 TAB at 09:21

## 2023-03-03 RX ADMIN — VERAPAMIL HYDROCHLORIDE 180 MG: 180 TABLET, FILM COATED, EXTENDED RELEASE ORAL at 19:31

## 2023-03-03 RX ADMIN — QUETIAPINE FUMARATE 25 MG: 25 TABLET ORAL at 19:31

## 2023-03-03 RX ADMIN — GUAIFENESIN 600 MG: 600 TABLET, EXTENDED RELEASE ORAL at 09:21

## 2023-03-03 RX ADMIN — SODIUM CHLORIDE: 9 INJECTION, SOLUTION INTRAVENOUS at 22:03

## 2023-03-03 RX ADMIN — FAMOTIDINE 20 MG: 10 INJECTION, SOLUTION INTRAVENOUS at 09:24

## 2023-03-03 ASSESSMENT — ENCOUNTER SYMPTOMS
FACIAL SWELLING: 0
BACK PAIN: 0
VOMITING: 0
COLOR CHANGE: 0
EYE DISCHARGE: 0
COUGH: 1
NAUSEA: 0
ABDOMINAL PAIN: 0
RHINORRHEA: 0
EYE REDNESS: 0
SHORTNESS OF BREATH: 0

## 2023-03-03 ASSESSMENT — PAIN SCALES - GENERAL: PAINLEVEL_OUTOF10: 0

## 2023-03-03 NOTE — ED NOTES
TRANSFER - OUT REPORT:    Verbal report given to Noel Harmon  being transferred to [address] [place] [address] for routine progression of patient care       Report consisted of patient's Situation, Background, Assessment and   Recommendations(SBAR). Information from the following report(s) Nurse Handoff Report was reviewed with the receiving nurse. Lines:   Peripheral IV 03/02/23 Left Antecubital (Active)   Site Assessment Clean, dry & intact 03/02/23 1627   Line Status Normal saline locked;Specimen collected; Flushed;Capped;Brisk blood return 03/02/23 1627   Phlebitis Assessment No symptoms 03/02/23 1627   Infiltration Assessment 0 03/02/23 1627   Dressing Status Clean, dry & intact; New dressing applied 03/02/23 1627   Dressing Type Transparent 03/02/23 1627   Dressing Intervention New 03/02/23 1627        Opportunity for questions and clarification was provided.       Patient transported with:  Registered Nurse      Modesta England RN  03/02/23 [id number]

## 2023-03-03 NOTE — ED NOTES
DR Calixto Chun notified of pt's current BP and is comfortable with the pt being transferred to room 347 at this time      [facility], [address] [facility]  03/02/23 2029

## 2023-03-03 NOTE — PROGRESS NOTES
GOALS:  STG:  Patient will consume easy to chew textures and thin liquids without overt signs or symptoms of aspiration. Patient will participate in modified barium swallow study as clinically indicated. LTG: Patient will tolerate least restrictive diet without overt signs or symptoms of airway compromise by discharge. SPEECH LANGUAGE PATHOLOGY: DYSPHAGIA  Initial Assessment    NAME: Kaleigh Silva  : 1940  MRN: [id number]    ADMISSION DATE: 3/2/2023  PRIMARY DIAGNOSIS: Sepsis ([address]  Confusion [R41.0]  Generalized weakness [R53.1]  Sepsis ([address] [A41.9]  Pneumonia of left lower lobe due to infectious organism [J18.9]  Cerebrovascular accident (CVA), unspecified mechanism ([address] [I63.9]  Dementia without behavioral disturbance, psychotic disturbance, mood disturbance, or anxiety, unspecified dementia severity, unspecified dementia type ([facility] [F03.90]    ICD-10: Treatment Diagnosis: R13.12 Dysphagia, Oropharyngeal Phase    RECOMMENDATIONS   Diet:  Diet Solids Recommendation: Easy to Chew  Liquid Consistency Recommendation: Thin    Medications: PO     Recommendations: Modified barium swallow study;Assistance with meals    Therapeutic Interventions: Patient/Family education;Diet tolerance monitoring   Compensatory Swallowing Strategies: Small bites/sips;Assist feed;Eat/Feed slowly; Remain upright for 30-45 minutes after meals;Upright as possible for all oral intake   Patient continues to require skilled intervention: Yes  D/C Recommendations: To be determined, Ongoing speech therapy is recommended during this hospitalization     ASSESSMENT    Dysphagia Diagnosis: Mild oral stage dysphagia; Suspected needs further assessment  Dysphagia Impression : No overt signs or symptoms of aspiration, but patient becomes increasingly fatigued with PO trials. throat clear observed at conclusion of assessment when repositioning bed to 45 degrees for bed alarm.  Recommend further objective assessment of pharyngeal phase of swallow due to LLL pneumonia and increased SOB with PO.      GENERAL    History of Present Injury/Illness: Ms. Isaías Burgess  has a past medical history of Atrial fibrillation ([facility] Dementia ([address] Depression, Hypercholesterolemia, Hypertension, and IBS (irritable bowel syndrome). . She also  has a past surgical history that includes Breast surgery; Breast biopsy (Left, 2014); orthopedic surgery (Left); other surgical history; hernia repair (2012); Breast surgery (2013); implant breast silicone/eq (Bilateral); Colonoscopy (06/2018); Colonoscopy (2012); and Upper gastrointestinal endoscopy (06/25/2018). Chart Reviewed: Yes  Behavior/Cognition: Alert; Cooperative;Pleasant mood; Requires cueing  Communication Observation: Functional  Follows Directions: Simple    Prior Dysphagia History: none reported     Current Diet : NPO  Current Liquid Diet : NPO    Respiratory Status: Room air              Pain:   Patient does not c/o pain, Patient does not appear in pain                                        OBJECTIVE    Patient Positioning: Upright in bed   Oral Motor   Labial: No impairment  Oral Hygiene: Moist;Clean  Lingual: No impairment  Dentition: Adequate     Volitional Cough: Strong  Baseline Vocal Quality: Normal    Oropharyngeal Phase:  Regular;Pureed;Mixed Consistencies;Pills; Thin  Assessment Method(s): Cervical auscultation;Observation;Palpation  Vocal Quality: No Impairment  Bolus Acceptance: No impairment  Bolus Formation/Control: Impaired  Type of Impairment: Delayed;Mastication  Propulsion: Delayed (# of seconds)  Oral Residue: None  Initiation of Swallow: No impairment  Laryngeal Elevation: Functional  Aspiration Signs/Symptoms: None  Pharyngeal Phase Characteristics: Poor endurance              PLAN    Duration/Frequency: To be determined pending MBS results    Dysphagia Outcome and Severity Scale (TIMO)  Dysphagia Outcome Severity Scale: Level 5: Mild dysphagia- Distant supervision.  May need one diet consistency restricted  Interpretation of Tool: The Dysphagia Outcome and Severity Scale (TIMO) is a simple, easy-to-use, 7-point scale developed to systematically rate the functional severity of dysphagia based on objective assessment and make recommendations for diet level, independence level, and type of nutrition. Normal(7), Functional(6), Mild(5), Mild-Moderate(4), Moderate(3), Moderate-Severe(2), Severe(1)    Speech Therapy Prognosis  Prognosis: Good    Education: Patient, RN, Physician (Caregiver)  Patient Education: Role of SLP, diet consistency recommendations, benefit of MBS  Patient Education Response: Verbalizes understanding    PRECAUTIONS/ALLERGIES: Patient has no known allergies.    Safety Devices in place: Yes        Therapy Time  SLP Individual Minutes  Time In: 0915  Time Out: 7937  Minutes: [id number] [place], [place]  3/3/2023 9:58 AM

## 2023-03-03 NOTE — CARE COORDINATION
Pt chart reviewed for discharge planning. [facility] [place] and caregiver at bedside. Plced call to daughterRocio about care planning. HIPPA compliant message left requesting call back to CM and/or follow up tomorrow with assigned CM staff. Pt is anticipated to return home with family and caregiver support. Therapy notes today recommended home health follow up if pt/family in agreement. CM will follow pt plan of care and assist with supportive care referrals pending pt clinical progress. Please consult case management if specific needs arise. 03/03/23 1959   Service Assessment   Patient Orientation Alert and Oriented   Cognition Alert   History Provided By Patient;Medical Record   Primary Caregiver Family   Accompanied By/Relationship daughter   Support Systems Spouse/Significant Other;Children; Other (Comment)  (pvt care giver)   Patient's Roger 8 is: Named in [address] [place] [address]   PCP Verified by CM Yes   Last Visit to PCP Within last 3 months   Prior Functional Level Assistance with the following:;Mobility;Housework   Current Functional Level Assistance with the following:;Mobility;Housework   Can patient return to prior living arrangement Yes   Ability to make needs known: Good   Family able to assist with home care needs: Yes   Would you like for me to discuss the discharge plan with any other family members/significant others, and if so, who?  Yes  (daughter,)   Financial Resources Medicare   Social/Functional History   Lives With Spouse   Type of Home House   Discharge Planning   Type of [address] Medications No   Type of [address]  (pvt care giver)   Patient expects to be discharged to: . [address] Discharge   Transition of Care Consult (CM Consult) Discharge [place] [id number] Resource Information Provided? No   Mode of Transport at Discharge Other (see comment)  (family)   Confirm Follow Up Transport Family   Condition of Participation: Discharge Planning   The Plan for Transition of Care is related to the following treatment goals: Pt will return home with supportive family and home care services as recommended. The Patient and/or Patient Representative was provided with a Choice of Provider? Patient;Patient Representative   Name of the Patient Representative who was provided with the Choice of Provider and agrees with the Discharge Plan?  daughter   The Patient and/Or Patient Representative agree with the Discharge Plan? Yes   Freedom of Choice list was provided with basic dialogue that supports the patient's individualized plan of care/goals, treatment preferences, and shares the quality data associated with the providers?   Yes

## 2023-03-03 NOTE — PLAN OF CARE
Problem: Discharge Planning  Goal: Discharge to home or other facility with appropriate resources  3/3/2023 0950 by Bashir Merino RN  Outcome: Progressing  3/2/2023 2133 by Scooby Erickson RN  Outcome: Progressing     Problem: Pain  Goal: Verbalizes/displays adequate comfort level or baseline comfort level  3/3/2023 0950 by Bashir Merino RN  Outcome: Progressing  3/2/2023 2133 by Scooby Erickson RN  Outcome: Progressing     Problem: Safety - Adult  Goal: Free from fall injury  Outcome: Progressing

## 2023-03-03 NOTE — H&P
H and p completed and dictated 3/2/23 but saved erroneously as progress note    Hospitalist History and Physical   Admit Date:  3/2/2023  4:09 PM   Name:  Aracelis Molina   Age:  80 y.o. Sex:  female  :  1940   MRN:  [id number]   Room:  [facility]    Presenting Complaint: Facial Droop     Reason(s) for Admission: Confusion [R41.0]  Generalized weakness [R53.1]  Sepsis ([address] [A41.9]  Pneumonia of left lower lobe due to infectious organism [J18.9]  Cerebrovascular accident (CVA), unspecified mechanism ([address] [I63.9]  Dementia without behavioral disturbance, psychotic disturbance, mood disturbance, or anxiety, unspecified dementia severity, unspecified dementia type ([facility] [address] [F03.90]     History of Present Illness:   Aracelis Molina is a 80 y.o. female with medical history of paroxysmal atrial fibrillation on Xarelto and verapamil rate control, hypertension and dementia with behavioral abnormalities on Seroquel nocturnally. She has been visiting her  in the hospital with a hernia recently and has had fatigue and is staying with family. Family reports that patient was difficult to arouse this morning weak and could not stand. Noticed increased temp complained of left shoulder plain and family felt possible left-sided weakness and possible left facial droop. Droop not felt to be significant by ER or neurology and initial work-up for code stroke negative. Patient found to have sepsis with fever leukocytosis and a new left lower lobe pneumonia. Neurology feels symptoms likely related to her baseline dementia with acute infectious process. Patient after receiving IV fluids somewhat more alert conversant but alert to person only. Pleasant and cooperative and maintains social graces. Discussed with daughter and son at bedside and patient to be admitted and will honor DO NOT RESUSCITATE status. Patient has had outpatient palliative care consultations.   Patient does have behavioral abnormality with her dementia with nocturnal confusion and will continue her nocturnal Seroquel but family expects she could have aggravation of behavioral abnormalities with change in environment during hospitalization. Discussed planned antibiotics supportive care and continued home meds including anticoagulation and rate control regarding atrial fibrillation. No dysuria. Serum creatinine okay. Lactic acid pending. A-fib apparently paroxysmal and EKG was sinus mechanism noted. Assessment & Plan:     Principal Problem:    Sepsis ([address]  Plan: Secondary to pneumonia. We will utilize sepsis protocol. Risk-benefit of aggressive IV hydration given patient age and status and BP stable. Creatinine normal.  Ceftriaxone and azithromycin. Blood cultures. Further intervention pending initial response and clinical stability    Active Problems:    Left lower lobe pneumonia  Plan: Above antibiotics, mucolytic, as needed bronchodilators. Dementia with behavioral disturbance  Plan: Continue home meds including Aricept and Seroquel. Monitor for behavioral disturbance. Dysphagia  Plan: Speech therapy evaluation. N.p.o. until speech therapy eval.  Meds with sips. Essential hypertension  Plan: Continue home lisinopril. Paroxysmal atrial fibrillation (HCC)  Plan: Continue home verapamil and anticoagulation. Xarelto. PT/OT evals and PPD needed/ordered? Yes  Diet: Diet NPO Exceptions are: Sips of Water with Meds, Sips of Clear Liquids  VTE prophylaxis: Already on anticoagulation  Code status: DNR    Hospital Problems:  Principal Problem:    Sepsis ([address]  Active Problems:    Left lower lobe pneumonia    Dementia with behavioral disturbance    Dysphagia    Essential hypertension    Paroxysmal atrial fibrillation (HCC)  Resolved Problems:    * No resolved hospital problems.  *       Past History:     Past Medical History:   Diagnosis Date    Atrial fibrillation ([facility] [address]     Dementia ([facility] [address]     Depression Hypercholesterolemia     Hypertension     IBS (irritable bowel syndrome)        Past Surgical History:   Procedure Laterality Date    BREAST BIOPSY Left 2014    BREAST SURGERY      bilat implant    BREAST SURGERY  2013    lumpectomy    COLONOSCOPY  06/2018     no polyps    COLONOSCOPY  2012    in [place] (repeat in 10 years)    HERNIA REPAIR  2012    inguinal    IMPLANT BREAST SILICONE/EQ Bilateral     35 yrs ago    ORTHOPEDIC SURGERY Left     bunion    OTHER SURGICAL HISTORY      face lift    UPPER GASTROINTESTINAL ENDOSCOPY  06/25/2018        Social History     Tobacco Use    Smoking status: Never    Smokeless tobacco: Never   Substance Use Topics    Alcohol use: Never      Social History     Substance and Sexual Activity   Drug Use Never       Family History   Problem Relation Age of Onset    Dementia Sister     Hypertension Mother     Cancer Sister 78        pancreatic    Hypertension Father         Immunization History   Administered Date(s) Administered    COVID-19, MODERNA BLUE border, Primary or Immunocompromised, (age 12y+), IM, 100 mcg/0.5mL 01/11/2021, 02/11/2021    Pneumococcal Conjugate 13-valent ([id number]) 11/20/2014    Zoster Recombinant (Shingrix) 07/31/2019, 11/19/2019     No Known Allergies  Prior to Admit Medications:  Current Outpatient Medications   Medication Instructions    atorvastatin (LIPITOR) 40 MG tablet TAKE 1 TABLET EVERY DAY    cyanocobalamin 1,000 mcg, Oral, DAILY    donepezil (ARICEPT) 10 mg, Oral, NIGHTLY    fluticasone (FLONASE) 50 MCG/ACT nasal spray 1 spray, Each Nostril, DAILY    Levocetirizine Dihydrochloride (XYZAL ALLERGY 24HR PO) Oral, DAILY    lisinopril (PRINIVIL;ZESTRIL) 20 MG tablet TAKE 1 TABLET EVERY DAY    Multiple Vitamins-Minerals (MULTIVITAMIN ADULTS PO) Oral, DAILY    QUEtiapine (SEROQUEL) 25 mg, Oral, Nightly    rivaroxaban (XARELTO) 20 mg, Oral, DAILY WITH BREAKFAST    verapamil (VERELAN) 240 mg, Oral, NIGHTLY, 1 every day         Objective:   Patient Vitals for the past 24 hrs:   Temp Pulse Resp BP SpO2   03/03/23 0320 98.2 °F (36.8 °C) 77 18 (!) 100/55 95 %   03/02/23 2345 98.4 °F (36.9 °C) 65 18 (!) 97/59 92 %   03/02/23 2048 98.1 °F (36.7 °C) 71 20 94/61 94 %   03/02/23 2030 -- 69 15 (!) 87/49 --   03/02/23 2025 -- 69 19 (!) 89/53 --   03/02/23 2010 -- 69 13 (!) 86/57 --   03/02/23 1956 99 °F (37.2 °C) -- -- -- --   03/02/23 1952 99 °F (37.2 °C) -- -- -- --   03/02/23 1936 -- 71 15 (!) 91/55 94 %   03/02/23 1930 -- 68 18 (!) 86/53 94 %   03/02/23 1810 -- 77 16 (!) 109/58 --   03/02/23 1800 -- 79 19 (!) 106/57 --   03/02/23 1740 -- 86 24 122/60 95 %   03/02/23 1710 -- 81 17 (!) 111/59 --   03/02/23 1703 -- 78 -- (!) 119/55 95 %   03/02/23 1630 -- 80 19 108/66 93 %   03/02/23 1620 -- -- -- (!) 104/53 --   03/02/23 1607 (!) 101.1 °F (38.4 °C) 79 19 119/62 95 %         Oxygen Therapy  SpO2: 95 %  O2 Device: None (Room air)    Estimated body mass index is 19.86 kg/m² as calculated from the following:    Height as of this encounter: 4' 10\" (1.473 m). Weight as of this encounter: 95 lb (43.1 kg). Intake/Output Summary (Last 24 hours) at 3/3/2023 0709  Last data filed at 3/3/2023 0615  Gross per 24 hour   Intake 1050 ml   Output 800 ml   Net 250 ml           Physical Exam:    Blood pressure (!) 100/55, pulse 77, temperature 98.2 °F (36.8 °C), temperature source Axillary, resp. rate 18, height 4' 10\" (1.473 m), weight 95 lb (43.1 kg), SpO2 95 %. General:    Alert to person only-advanced dementia. Fatigue. Head:  Normocephalic, atraumatic  Eyes:  Sclerae appear normal.  Pupils equally round. ENT:  Nares appear normal.  Moist oral mucosa  Neck:  No restricted ROM. Trachea midline   CV:   Rate controlled and fairly regular at time of exam.  No gross S3 gallop no JVD or HJR  Lungs:   Pulmonary rales left base. Faint rales right side but no obvious wheeze. Abdomen:   Soft, nontender, nondistended. Extremities: No cyanosis or clubbing.   No unilateral lower extremity edema  Skin:     No rashes and normal coloration. Warm and dry. Neuro:  CN II-XII grossly intact. Sensation intact. Moves all extremities symmetrically. No past-pointing.   No focal motor deficits     I have personally reviewed labs and tests:  Recent Labs:  Recent Results (from the past 24 hour(s))   EKG 12 Lead    Collection Time: 03/02/23  4:20 PM   Result Value Ref Range    Ventricular Rate 78 BPM    Atrial Rate 81 BPM    P-R Interval 163 ms    QRS Duration 76 ms    Q-T Interval 488 ms    QTc Calculation (Bazett) 556 ms    P Axis 51 degrees    R Axis 10 degrees    T Axis 29 degrees    Diagnosis Sinus rhythm    POCT Glucose    Collection Time: 03/02/23  4:22 PM   Result Value Ref Range    POC Glucose 124 (H) 65 - 100 mg/dL    Performed by: Nia    CBC with Auto Differential    Collection Time: 03/02/23  4:23 PM   Result Value Ref Range    WBC 18.3 (H) 4.3 - 11.1 K/uL    RBC 4.12 4.05 - 5.2 M/uL    Hemoglobin 13.5 11.7 - 15.4 g/dL    Hematocrit 39.2 35.8 - 46.3 %    MCV 95.1 82.0 - 102.0 FL    MCH 32.8 26.1 - 32.9 PG    MCHC 34.4 31.4 - 35.0 g/dL    RDW 12.9 11.9 - 14.6 %    Platelets 363 758 - 699 K/uL    MPV 11.1 9.4 - 12.3 FL    nRBC 0.00 0.0 - 0.2 K/uL    Differential Type AUTOMATED      Seg Neutrophils 87 (H) 43 - 78 %    Lymphocytes 5 (L) 13 - 44 %    Monocytes 7 4.0 - 12.0 %    Eosinophils % 1 0.5 - 7.8 %    Basophils 0 0.0 - 2.0 %    Immature Granulocytes 1 0.0 - 5.0 %    Segs Absolute 15.9 (H) 1.7 - 8.2 K/UL    Absolute Lymph # 0.9 0.5 - 4.6 K/UL    Absolute Mono # 1.2 0.1 - 1.3 K/UL    Absolute Eos # 0.1 0.0 - 0.8 K/UL    Basophils Absolute 0.0 0.0 - 0.2 K/UL    Absolute Immature Granulocyte 0.2 0.0 - 0.5 K/UL   Comprehensive Metabolic Panel w/ Reflex to MG    Collection Time: 03/02/23  4:23 PM   Result Value Ref Range    Sodium 136 133 - 143 mmol/L    Potassium 3.8 3.5 - 5.1 mmol/L    Chloride 104 101 - 110 mmol/L    CO2 26 21 - 32 mmol/L    Anion Gap 6 2 - 11 mmol/L    Glucose 136 (H) 65 - 100 mg/dL    BUN 18 8 - 23 MG/DL    Creatinine 0.92 0.6 - 1.0 MG/DL    Est, Glom Filt Rate >60 >60 ml/min/1.73m2    Calcium 9.6 8.3 - 10.4 MG/DL    Total Bilirubin 1.2 (H) 0.2 - 1.1 MG/DL    ALT 27 12 - 65 U/L    AST 19 15 - 37 U/L    Alk Phosphatase 104 50 - 136 U/L    Total Protein 7.2 6.3 - 8.2 g/dL    Albumin 3.3 3.2 - 4.6 g/dL    Globulin 3.9 2.8 - 4.5 g/dL    Albumin/Globulin Ratio 0.8 0.4 - 1.6     Troponin    Collection Time: 03/02/23  4:23 PM   Result Value Ref Range    Troponin, High Sensitivity 18.5 (H) 0 - 14 pg/mL   Protime-INR    Collection Time: 03/02/23  4:23 PM   Result Value Ref Range    Protime 16.4 (H) 12.6 - 14.3 sec    INR 1.3     POCT Glucose    Collection Time: 03/02/23  4:25 PM   Result Value Ref Range    Glucose 124 mg/dL   POCT INR    Collection Time: 03/02/23  4:25 PM   Result Value Ref Range    POC Protime 17.1 (H) 9.6 - 11.6 SECS    POC INR 1.5 (H) 0.9 - 1.2     Urinalysis    Collection Time: 03/02/23  5:31 PM   Result Value Ref Range    Color, UA YELLOW/STRAW      Appearance CLEAR      Specific Gravity, UA 1.028 (H) 1.001 - 1.023      pH, Urine 6.0 5.0 - 9.0      Protein, UA 30 (A) NEG mg/dL    Glucose, UA Negative mg/dL    Ketones, Urine Negative NEG mg/dL    Bilirubin Urine Negative NEG      Blood, Urine Negative NEG      Urobilinogen, Urine 1.0 0.2 - 1.0 EU/dL    Nitrite, Urine Negative NEG      Leukocyte Esterase, Urine Negative NEG      WBC, UA 0-3 0 /hpf    RBC, UA 0-3 0 /hpf    Epithelial Cells UA 0-3 0 /hpf    BACTERIA, URINE 0 0 /hpf   Lactic Acid    Collection Time: 03/02/23  5:53 PM   Result Value Ref Range    Lactic Acid, Plasma 2.2 (H) 0.4 - 2.0 MMOL/L   COVID-19, Rapid    Collection Time: 03/02/23  5:53 PM    Specimen: Nasopharyngeal   Result Value Ref Range    Source Nasopharyngeal      SARS-CoV-2, Rapid Not detected NOTD     Lactate, Sepsis    Collection Time: 03/02/23  7:13 PM   Result Value Ref Range    Lactic Acid, Sepsis 1.3 0.4 - 2.0 MMOL/L   CBC with Auto Differential    Collection Time: 03/03/23  6:19 AM   Result Value Ref Range    WBC 13.3 (H) 4.3 - 11.1 K/uL    RBC 3.47 (L) 4.05 - 5.2 M/uL    Hemoglobin 11.2 (L) 11.7 - 15.4 g/dL    Hematocrit 33.5 (L) 35.8 - 46.3 %    MCV 96.5 82.0 - 102.0 FL    MCH 32.3 26.1 - 32.9 PG    MCHC 33.4 31.4 - 35.0 g/dL    RDW 13.2 11.9 - 14.6 %    Platelets 939 155 - 728 K/uL    MPV 11.5 9.4 - 12.3 FL    nRBC 0.00 0.0 - 0.2 K/uL    Differential Type AUTOMATED      Seg Neutrophils 81 (H) 43 - 78 %    Lymphocytes 11 (L) 13 - 44 %    Monocytes 7 4.0 - 12.0 %    Eosinophils % 0 (L) 0.5 - 7.8 %    Basophils 0 0.0 - 2.0 %    Immature Granulocytes 1 0.0 - 5.0 %    Segs Absolute 10.8 (H) 1.7 - 8.2 K/UL    Absolute Lymph # 1.5 0.5 - 4.6 K/UL    Absolute Mono # 0.9 0.1 - 1.3 K/UL    Absolute Eos # 0.0 0.0 - 0.8 K/UL    Basophils Absolute 0.0 0.0 - 0.2 K/UL    Absolute Immature Granulocyte 0.1 0.0 - 0.5 K/UL       I have personally reviewed imaging studies:  CT HEAD WO CONTRAST    Result Date: 3/2/2023  STUDY: CT CODE NEURO HEAD WO CONTRAST [id number] STUDY DATE: 3/2/2023 4:40 PM HISTORY: stroke like symptoms TECHNIQUE: Contiguous axial CT images were obtained of the head without intravenous contrast. Coronal and sagittal reconstructions were performed. Radiation dose reduction techniques were used for this study: All CT scans performed at this facility use one or all of the following: Automated exposure control, adjustment of the mA and/or kVp according to patient's size, iterative reconstruction. CONTRAST: None. COMPARISON:  June 8, 2021 FINDINGS: HEMORRHAGE: None. BRAIN PARENCHYMA: Grey-white matter differentiation is maintained. WHITE MATTER: Periventricular and subcortical white matter hypoattenuation is again noted, nonspecific, but most likely sequela of chronic microvascular ischemic change. VENTRICLES: Stable diffuse cerebral volume loss. CALVARIUM: No fracture. VASCULATURE: Scattered intracranial and scattered calcifications.  GLOBES AND ORBITS: Bilateral cataract extraction. SINUSES: Mild mucosal thickening within the left maxillary sinus. 1.  No acute cranial abnormality. 2.  Stable senescent changes, as above. XR CHEST PORTABLE    Result Date: 3/2/2023  Chest X-ray INDICATION: Post CVA AP/PA view of the chest was obtained. FINDINGS: There is infiltrate in the lateral left lung base. Right lung is clear. There is mild cardiomegaly. Vascular calcification is present. Left lower lobe pneumonia       Echocardiogram:  No results found for this or any previous visit.         Orders Placed This Encounter   Medications    acetaminophen (TYLENOL) tablet 1,000 mg    0.9 % sodium chloride bolus    cefTRIAXone (ROCEPHIN) 1,000 mg in sodium chloride 0.9 % 50 mL IVPB (mini-bag)     Order Specific Question:   Antimicrobial Indications     Answer:   Pneumonia (CAP)    azithromycin (ZITHROMAX) 500 mg in sodium chloride 0.9 % 250 mL IVPB ([id number])     Order Specific Question:   Antimicrobial Indications     Answer:   Pneumonia (CAP)    0.9 % sodium chloride infusion    sodium chloride flush 0.9 % injection 5-40 mL    sodium chloride flush 0.9 % injection 5-40 mL    0.9 % sodium chloride infusion    famotidine (PEPCID) 20 mg in sodium chloride (PF) 0.9 % 10 mL injection    aluminum & magnesium hydroxide-simethicone (MAALOX) 200-200-20 MG/5ML suspension 30 mL    DISCONTD: acetaminophen (TYLENOL) tablet 650 mg    DISCONTD: acetaminophen (TYLENOL) suppository 650 mg    AND Linked Order Group     cefTRIAXone (ROCEPHIN) 1,000 mg in sodium chloride 0.9 % 50 mL IVPB (mini-bag)      Order Specific Question:   Antimicrobial Indications      Answer:   Pneumonia (CAP)      Order Specific Question:   CAP duration of therapy      Answer:   7 days     azithromycin (ZITHROMAX) 500 mg in sodium chloride 0.9 % 250 mL IVPB ([id number])      Order Specific Question:   Antimicrobial Indications      Answer:   Pneumonia (CAP)      Order Specific Question:   CAP duration of therapy      Answer: Other      Order Specific Question:   Other CAP Duration      Answer:   3 days    DISCONTD: atorvastatin (LIPITOR) tablet 40 mg    vitamin B-12 (CYANOCOBALAMIN) tablet 1,000 mcg    donepezil (ARICEPT) tablet 10 mg    fluticasone (FLONASE) 50 MCG/ACT nasal spray 1 spray    lisinopril (PRINIVIL;ZESTRIL) tablet 20 mg    QUEtiapine (SEROQUEL) tablet 25 mg    rivaroxaban (XARELTO) tablet 20 mg     Order Specific Question:   Indication of Use     Answer:   A Fib/A Flutter    verapamil (CALAN SR) extended release tablet 240 mg    OR Linked Order Group     acetaminophen (TYLENOL) tablet 650 mg     acetaminophen (TYLENOL) suppository 650 mg    OR Linked Order Group     ondansetron (ZOFRAN-ODT) disintegrating tablet 4 mg     ondansetron (ZOFRAN) injection 4 mg    polyethylene glycol (GLYCOLAX) packet 17 g    bisacodyl (DULCOLAX) suppository 10 mg    DISCONTD: aspirin EC tablet 81 mg    DISCONTD: aspirin suppository 300 mg    atorvastatin (LIPITOR) tablet 80 mg    labetalol (NORMODYNE;TRANDATE) injection 10 mg    guaiFENesin (MUCINEX) extended release tablet 600 mg         Signed:  Yuval Amos DO    Part of this note may have been written by using a voice dictation software. The note has been proof read but may still contain some grammatical/other typographical errors.

## 2023-03-03 NOTE — PROGRESS NOTES
Hospitalist Progress Note   Admit Date:  3/2/2023  4:09 PM   Name:  Suad Miramontes   Age:  80 y.o. Sex:  female  :  1940   MRN:  [id number]   Room:  [facility]    Presenting Complaint: Facial Droop     Reason(s) for Admission: Confusion [R41.0]  Generalized weakness [R53.1]  Sepsis ([address] [A41.9]  Pneumonia of left lower lobe due to infectious organism [J18.9]  Cerebrovascular accident (CVA), unspecified mechanism ([address] [I63.9]  Dementia without behavioral disturbance, psychotic disturbance, mood disturbance, or anxiety, unspecified dementia severity, unspecified dementia type [facility]) [F03.90]     Hospital Course:     From history and physical HPI:  Suad Miramontes is a 80 y.o. female with medical history of paroxysmal atrial fibrillation on Xarelto and verapamil rate control, hypertension and dementia with behavioral abnormalities on Seroquel nocturnally. She has been visiting her  in the hospital with a hernia recently and has had fatigue and is staying with family. Family reports that patient was difficult to arouse this morning weak and could not stand. Noticed increased temp complained of left shoulder plain and family felt possible left-sided weakness and possible left facial droop. Droop not felt to be significant by ER or neurology and initial work-up for code stroke negative. Patient found to have sepsis with fever leukocytosis and a new left lower lobe pneumonia. Neurology feels symptoms likely related to her baseline dementia with acute infectious process. Patient after receiving IV fluids somewhat more alert conversant but alert to person only. Pleasant and cooperative and maintains social graces. Discussed with daughter and son at bedside and patient to be admitted and will honor DO NOT RESUSCITATE status. Patient has had outpatient palliative care consultations.   Patient does have behavioral abnormality with her dementia with nocturnal confusion and will continue her nocturnal Seroquel but family expects she could have aggravation of behavioral abnormalities with change in environment during hospitalization. Discussed planned antibiotics supportive care and continued home meds including anticoagulation and rate control regarding atrial fibrillation. No dysuria. Serum creatinine okay. Lactic acid pending. A-fib apparently paroxysmal and EKG was sinus mechanism noted. Subjective & 24hr Events (03/03/23): Patient more alert today. Apparently no significant behavioral problems last evening but her lisinopril and verapamil held due to borderline hypotension and she appears to have responded to IV hydration. She does have paroxysmal A-fib and will restart her verapamil this evening at a lower dosing and observe. Slowly restart meds as clinically improves. No significant productive cough but much more alert. Denies any new pain, nausea or vomiting. Denies diarrhea. Denies shaking chills or subjective high fevers. Assessment & Plan:      Principal Problem:    Sepsis ([address]  Plan: Secondary to pneumonia. We will utilize sepsis protocol. Risk-benefit of aggressive IV hydration given patient age and status and BP stable. Creatinine normal.  Ceftriaxone and azithromycin. Blood cultures. Further intervention pending initial response and clinical stability  3/3--continue same care-continue current rate IV fluids at least another 24 hours and then consider attenuate given age if BP stable      Active Problems:    Left lower lobe pneumonia  Plan: Above antibiotics, mucolytic, as needed bronchodilators. 3/3No significant productive cough-consider sputum evaluation but has received multiple doses of antibiotics.-Continue ceftriaxone and azithromycin full course. Continue pulmonary toilet, supportive care. Dementia with behavioral disturbance  Plan: Continue home meds including Aricept and Seroquel.   Monitor for behavioral disturbance. 3/3--continue home medications-did well last evening-monitor    Dysphagia  Plan: Speech therapy evaluation. N.p.o. until speech therapy eval.  Meds with sips. Essential hypertension  3/3--- lisinopril held-slowly restart as clinically improves-monitor      Paroxysmal atrial fibrillation (HCC)  Plan: Continue home verapamil and anticoagulation. Xarelto. 3/3--- verapamil held last evening. Try restart tonight at lower dose 180 mg to replace 240 mg dosage and observe. She has paroxysmal A-fib-hold if BP remains low. Continue IV hydration. PT/OT evals and PPD needed/ordered? Yes  Diet: Diet NPO Exceptions are: Sips of Water with Meds, Sips of Clear Liquids  VTE prophylaxis: Already on anticoagulation  Code status: DNR               Hospital Problems:  Principal Problem:    Sepsis ([address]  Active Problems:    Left lower lobe pneumonia    Dementia with behavioral disturbance    Dysphagia    Essential hypertension    Paroxysmal atrial fibrillation (HCC)  Resolved Problems:    * No resolved hospital problems.  *      Objective:   Patient Vitals for the past 24 hrs:   Temp Pulse Resp BP SpO2   03/03/23 0802 99 °F (37.2 °C) 78 18 (!) 126/55 92 %   03/03/23 0320 98.2 °F (36.8 °C) 77 18 (!) 100/55 95 %   03/02/23 2345 98.4 °F (36.9 °C) 65 18 (!) 97/59 92 %   03/02/23 2048 98.1 °F (36.7 °C) 71 20 94/61 94 %   03/02/23 2030 -- 69 15 (!) 87/49 --   03/02/23 2025 -- 69 19 (!) 89/53 --   03/02/23 2010 -- 69 13 (!) 86/57 --   03/02/23 1956 99 °F (37.2 °C) -- -- -- --   03/02/23 1952 99 °F (37.2 °C) -- -- -- --   03/02/23 1936 -- 71 15 (!) 91/55 94 %   03/02/23 1930 -- 68 18 (!) 86/53 94 %   03/02/23 1810 -- 77 16 (!) 109/58 --   03/02/23 1800 -- 79 19 (!) 106/57 --   03/02/23 1740 -- 86 24 122/60 95 %   03/02/23 1710 -- 81 17 (!) 111/59 --   03/02/23 1703 -- 78 -- (!) 119/55 95 %   03/02/23 1630 -- 80 19 108/66 93 %   03/02/23 1620 -- -- -- (!) 104/53 --   03/02/23 1607 (!) 101.1 °F (38.4 °C) 79 19 119/62 95 %       Oxygen Therapy  SpO2: 92 %  O2 Device: None (Room air)    Estimated body mass index is 19.86 kg/m² as calculated from the following:    Height as of this encounter: 4' 10\" (1.473 m). Weight as of this encounter: 95 lb (43.1 kg). Intake/Output Summary (Last 24 hours) at 3/3/2023 1057  Last data filed at 3/3/2023 0615  Gross per 24 hour   Intake 1050 ml   Output 800 ml   Net 250 ml         Physical Exam:   Blood pressure (!) 126/55, pulse 78, temperature 99 °F (37.2 °C), temperature source Oral, resp. rate 18, height 4' 10\" (1.473 m), weight 95 lb (43.1 kg), SpO2 92 %. Physical Exam:   General:                      Alert to person. Maintain social graces. Not alert to place or time. History of dementia. More alert today  Eyes:                                           Conjunctivae/corneas clear. Pupils equally round and reactive to light, extraocular movements intact. Lungs:                       Pulmonary rales left base unchanged. No wheezing at time of exam.  Symmetric excursion of the chest wall noted  Heart:                     Regular rate and rhythm, S1, S2 normal, no murmur, click, rub or gallop. Abdomen:                       Soft, non-tender. Bowel sounds normal. No masses,  No organomegaly. Extremities:                     Extremities normal, atraumatic, no cyanosis or unilateral lower extremity edema  Neurologic:                     CNII-XII intact. Normal strength, sensation and reflexes throughout. Lab/Data Review: All lab results for the last 24 hours reviewed.     I have personally reviewed labs and tests:  Recent Labs:  Recent Results (from the past 48 hour(s))   EKG 12 Lead    Collection Time: 03/02/23  4:20 PM   Result Value Ref Range    Ventricular Rate 78 BPM    Atrial Rate 81 BPM    P-R Interval 163 ms    QRS Duration 76 ms    Q-T Interval 488 ms    QTc Calculation (Bazett) 556 ms    P Axis 51 degrees    R Axis 10 degrees    T Axis 29 degrees    Diagnosis Sinus rhythm    POCT Glucose    Collection Time: 03/02/23  4:22 PM   Result Value Ref Range    POC Glucose 124 (H) 65 - 100 mg/dL    Performed by: Nia    CBC with Auto Differential    Collection Time: 03/02/23  4:23 PM   Result Value Ref Range    WBC 18.3 (H) 4.3 - 11.1 K/uL    RBC 4.12 4.05 - 5.2 M/uL    Hemoglobin 13.5 11.7 - 15.4 g/dL    Hematocrit 39.2 35.8 - 46.3 %    MCV 95.1 82.0 - 102.0 FL    MCH 32.8 26.1 - 32.9 PG    MCHC 34.4 31.4 - 35.0 g/dL    RDW 12.9 11.9 - 14.6 %    Platelets 786 091 - 391 K/uL    MPV 11.1 9.4 - 12.3 FL    nRBC 0.00 0.0 - 0.2 K/uL    Differential Type AUTOMATED      Seg Neutrophils 87 (H) 43 - 78 %    Lymphocytes 5 (L) 13 - 44 %    Monocytes 7 4.0 - 12.0 %    Eosinophils % 1 0.5 - 7.8 %    Basophils 0 0.0 - 2.0 %    Immature Granulocytes 1 0.0 - 5.0 %    Segs Absolute 15.9 (H) 1.7 - 8.2 K/UL    Absolute Lymph # 0.9 0.5 - 4.6 K/UL    Absolute Mono # 1.2 0.1 - 1.3 K/UL    Absolute Eos # 0.1 0.0 - 0.8 K/UL    Basophils Absolute 0.0 0.0 - 0.2 K/UL    Absolute Immature Granulocyte 0.2 0.0 - 0.5 K/UL   Comprehensive Metabolic Panel w/ Reflex to MG    Collection Time: 03/02/23  4:23 PM   Result Value Ref Range    Sodium 136 133 - 143 mmol/L    Potassium 3.8 3.5 - 5.1 mmol/L    Chloride 104 101 - 110 mmol/L    CO2 26 21 - 32 mmol/L    Anion Gap 6 2 - 11 mmol/L    Glucose 136 (H) 65 - 100 mg/dL    BUN 18 8 - 23 MG/DL    Creatinine 0.92 0.6 - 1.0 MG/DL    Est, Glom Filt Rate >60 >60 ml/min/1.73m2    Calcium 9.6 8.3 - 10.4 MG/DL    Total Bilirubin 1.2 (H) 0.2 - 1.1 MG/DL    ALT 27 12 - 65 U/L    AST 19 15 - 37 U/L    Alk Phosphatase 104 50 - 136 U/L    Total Protein 7.2 6.3 - 8.2 g/dL    Albumin 3.3 3.2 - 4.6 g/dL    Globulin 3.9 2.8 - 4.5 g/dL    Albumin/Globulin Ratio 0.8 0.4 - 1.6     Troponin    Collection Time: 03/02/23  4:23 PM   Result Value Ref Range    Troponin, High Sensitivity 18.5 (H) 0 - 14 pg/mL   Protime-INR    Collection Time: 03/02/23  4:23 PM   Result Value Ref Range Protime 16.4 (H) 12.6 - 14.3 sec    INR 1.3     POCT Glucose    Collection Time: 03/02/23  4:25 PM   Result Value Ref Range    Glucose 124 mg/dL   POCT INR    Collection Time: 03/02/23  4:25 PM   Result Value Ref Range    POC Protime 17.1 (H) 9.6 - 11.6 SECS    POC INR 1.5 (H) 0.9 - 1.2     Urinalysis    Collection Time: 03/02/23  5:31 PM   Result Value Ref Range    Color, UA YELLOW/STRAW      Appearance CLEAR      Specific Gravity, UA 1.028 (H) 1.001 - 1.023      pH, Urine 6.0 5.0 - 9.0      Protein, UA 30 (A) NEG mg/dL    Glucose, UA Negative mg/dL    Ketones, Urine Negative NEG mg/dL    Bilirubin Urine Negative NEG      Blood, Urine Negative NEG      Urobilinogen, Urine 1.0 0.2 - 1.0 EU/dL    Nitrite, Urine Negative NEG      Leukocyte Esterase, Urine Negative NEG      WBC, UA 0-3 0 /hpf    RBC, UA 0-3 0 /hpf    Epithelial Cells UA 0-3 0 /hpf    BACTERIA, URINE 0 0 /hpf   Culture, Urine    Collection Time: 03/02/23  5:31 PM    Specimen: Urine, clean catch    URINE   Result Value Ref Range    Special Requests NO SPECIAL REQUESTS      Culture        No growth after short period of incubation. Further results to follow after overnight incubation.    Culture, Blood 1    Collection Time: 03/02/23  5:53 PM    Specimen: Blood   Result Value Ref Range    Special Requests LEFT  Antecubital        Culture NO GROWTH AFTER 14 HOURS     Lactic Acid    Collection Time: 03/02/23  5:53 PM   Result Value Ref Range    Lactic Acid, Plasma 2.2 (H) 0.4 - 2.0 MMOL/L   COVID-19, Rapid    Collection Time: 03/02/23  5:53 PM    Specimen: Nasopharyngeal   Result Value Ref Range    Source Nasopharyngeal      SARS-CoV-2, Rapid Not detected NOTD     Culture, Blood 1    Collection Time: 03/02/23  6:11 PM    Specimen: Blood   Result Value Ref Range    Special Requests LEFT  ARM        Culture NO GROWTH AFTER 14 HOURS     Lactate, Sepsis    Collection Time: 03/02/23  7:13 PM   Result Value Ref Range    Lactic Acid, Sepsis 1.3 0.4 - 2.0 MMOL/L CBC with Auto Differential    Collection Time: 03/03/23  6:19 AM   Result Value Ref Range    WBC 13.3 (H) 4.3 - 11.1 K/uL    RBC 3.47 (L) 4.05 - 5.2 M/uL    Hemoglobin 11.2 (L) 11.7 - 15.4 g/dL    Hematocrit 33.5 (L) 35.8 - 46.3 %    MCV 96.5 82.0 - 102.0 FL    MCH 32.3 26.1 - 32.9 PG    MCHC 33.4 31.4 - 35.0 g/dL    RDW 13.2 11.9 - 14.6 %    Platelets 931 387 - 632 K/uL    MPV 11.5 9.4 - 12.3 FL    nRBC 0.00 0.0 - 0.2 K/uL    Differential Type AUTOMATED      Seg Neutrophils 81 (H) 43 - 78 %    Lymphocytes 11 (L) 13 - 44 %    Monocytes 7 4.0 - 12.0 %    Eosinophils % 0 (L) 0.5 - 7.8 %    Basophils 0 0.0 - 2.0 %    Immature Granulocytes 1 0.0 - 5.0 %    Segs Absolute 10.8 (H) 1.7 - 8.2 K/UL    Absolute Lymph # 1.5 0.5 - 4.6 K/UL    Absolute Mono # 0.9 0.1 - 1.3 K/UL    Absolute Eos # 0.0 0.0 - 0.8 K/UL    Basophils Absolute 0.0 0.0 - 0.2 K/UL    Absolute Immature Granulocyte 0.1 0.0 - 0.5 K/UL   Lipid Panel    Collection Time: 03/03/23  6:19 AM   Result Value Ref Range    Cholesterol, Total 111 MG/DL    Triglycerides 72 35 - 150 MG/DL    HDL 55 40 - 60 MG/DL    LDL Calculated 41.6 <100 MG/DL    VLDL Cholesterol Calculated 14.4 6.0 - 23.0 MG/DL    Chol/HDL Ratio 2.0 <200     Comprehensive Metabolic Panel w/ Reflex to MG    Collection Time: 03/03/23  6:19 AM   Result Value Ref Range    Sodium 138 133 - 143 mmol/L    Potassium 3.6 3.5 - 5.1 mmol/L    Chloride 112 (H) 101 - 110 mmol/L    CO2 22 21 - 32 mmol/L    Anion Gap 4 2 - 11 mmol/L    Glucose 103 (H) 65 - 100 mg/dL    BUN 15 8 - 23 MG/DL    Creatinine 0.53 (L) 0.6 - 1.0 MG/DL    Est, Glom Filt Rate >60 >60 ml/min/1.73m2    Calcium 8.0 (L) 8.3 - 10.4 MG/DL    Total Bilirubin 0.9 0.2 - 1.1 MG/DL    ALT 23 12 - 65 U/L    AST 23 15 - 37 U/L    Alk Phosphatase 81 50 - 136 U/L    Total Protein 5.4 (L) 6.3 - 8.2 g/dL    Albumin 2.3 (L) 3.2 - 4.6 g/dL    Globulin 3.1 2.8 - 4.5 g/dL    Albumin/Globulin Ratio 0.7 0.4 - 1.6         I have personally reviewed imaging studies:  Other Studies:  XR CHEST PORTABLE   Final Result   Left lower lobe pneumonia         CT HEAD WO CONTRAST   Final Result      1.  No acute cranial abnormality.   2.  Stable senescent changes, as above.             Current Meds:  Current Facility-Administered Medications   Medication Dose Route Frequency    verapamil (CALAN SR) extended release tablet 180 mg  180 mg Oral Nightly    0.9 % sodium chloride infusion   IntraVENous Continuous    sodium chloride flush 0.9 % injection 5-40 mL  5-40 mL IntraVENous 2 times per day    sodium chloride flush 0.9 % injection 5-40 mL  5-40 mL IntraVENous PRN    0.9 % sodium chloride infusion   IntraVENous PRN    famotidine (PEPCID) 20 mg in sodium chloride (PF) 0.9 % 10 mL injection  20 mg IntraVENous Daily    aluminum & magnesium hydroxide-simethicone (MAALOX) 200-200-20 MG/5ML suspension 30 mL  30 mL Oral Q6H PRN    cefTRIAXone (ROCEPHIN) 1,000 mg in sodium chloride 0.9 % 50 mL IVPB (mini-bag)  1,000 mg IntraVENous Q24H    And    azithromycin (ZITHROMAX) 500 mg in sodium chloride 0.9 % 250 mL IVPB ([id number])  500 mg IntraVENous Q24H    vitamin B-12 (CYANOCOBALAMIN) tablet 1,000 mcg  1,000 mcg Oral Daily    donepezil (ARICEPT) tablet 10 mg  10 mg Oral Nightly    fluticasone (FLONASE) 50 MCG/ACT nasal spray 1 spray  1 spray Each Nostril Daily PRN    [Held by provider] lisinopril (PRINIVIL;ZESTRIL) tablet 20 mg  20 mg Oral Daily    QUEtiapine (SEROQUEL) tablet 25 mg  25 mg Oral Nightly    rivaroxaban (XARELTO) tablet 20 mg  20 mg Oral Daily with breakfast    [Held by provider] verapamil (CALAN SR) extended release tablet 240 mg  240 mg Oral Nightly    acetaminophen (TYLENOL) tablet 650 mg  650 mg Oral Q4H PRN    Or    acetaminophen (TYLENOL) suppository 650 mg  650 mg Rectal Q4H PRN    ondansetron (ZOFRAN-ODT) disintegrating tablet 4 mg  4 mg Oral Q8H PRN    Or    ondansetron (ZOFRAN) injection 4 mg  4 mg IntraVENous Q6H PRN    polyethylene glycol (GLYCOLAX) packet 17 g  17  g Oral Daily PRN    bisacodyl (DULCOLAX) suppository 10 mg  10 mg Rectal Daily PRN    atorvastatin (LIPITOR) tablet 80 mg  80 mg Oral Nightly    labetalol (NORMODYNE;TRANDATE) injection 10 mg  10 mg IntraVENous Q10 Min PRN    guaiFENesin (MUCINEX) extended release tablet 600 mg  600 mg Oral BID       Signed:  Katie Su DO    Part of this note may have been written by using a voice dictation software. The note has been proof read but may still contain some grammatical/other typographical errors.

## 2023-03-03 NOTE — PROGRESS NOTES
ACUTE OCCUPATIONAL THERAPY GOALS:   (Developed with and agreed upon by patient and/or caregiver.)  1. Patient will perform grooming with SPV and adaptive equipment as needed. 2. Patient will perform upper body dressing with SPV and adaptive equipment as needed. 3. Patient will perform lower body dressing with SPV and adaptive equipment as needed. 4. Patient will perform bathing with MIN A and adaptive equipment as needed. 5. Patient will perform toileting and toilet transfer with SBA and adaptive equipment as needed. 6. Patient will perform ADL functional mobility and tranfers in room with SPV and adaptive equipment as needed. 7. Patient/family to demonstrate knowledge of home safety and DME recommendations. Timeframe: 7 visits     OCCUPATIONAL THERAPY Initial Assessment, Daily Note, and PM       OT Visit Days: 1  Acknowledge Orders  Time  OT Charge Capture  Rehab Caseload Tracker      Arlen Liu is a 80 y.o. female   PRIMARY DIAGNOSIS: Sepsis ([facility] [address]  Confusion [R41.0]  Generalized weakness [R53.1]  Sepsis ([address] [A41.9]  Pneumonia of left lower lobe due to infectious organism [J18.9]  Cerebrovascular accident (CVA), unspecified mechanism ([address] [I63.9]  Dementia without behavioral disturbance, psychotic disturbance, mood disturbance, or anxiety, unspecified dementia severity, unspecified dementia type ([address] [F03.90]       Reason for Referral: Generalized Muscle Weakness (M62.81)  Difficulty in walking, Not elsewhere classified (R26.2)  Inpatient: Payor: MEDICARE / Plan: MEDICARE PART A AND B / Product Type: *No Product type* /     ASSESSMENT:     REHAB RECOMMENDATIONS:   Recommendation to date pending progress:  Setting:  Home Health Therapy plans for sitters at home    Equipment:    To Be Determined     ASSESSMENT:  Ms. Jarred Lemus is admitted for the above diagnoses and presents with overall deficits in strength, functional mobility and ADL performance.  At baseline, she lives with spouse in a 2 level home with all needs on the main level. Patient and sitting in room report patient needs occasional assist ADLs and independence with mobility. Today, she was able to perform bed mobility, sitting balance, household mobility, toileting, and grooming. Presents with increased weakness, confusion and decreased ability to perform self care tasks. She was left in supine with all needs met and in reach. Pt is functioning below their baseline and will benefit from skilled OT during hospital stay. Anticipate pt will benefit from [facility] + sitter care upon discharge.       [address] [place] [facility]"" [address] [address] [facility] 6 Clicks Daily Activity Inpatient Short Form:    AM-PAC Daily Activity - Inpatient   How much help is needed for putting on and taking off regular lower body clothing?: A Lot  How much help is needed for bathing (which includes washing, rinsing, drying)?: A Lot  How much help is needed for toileting (which includes using toilet, bedpan, or urinal)?: A Little  How much help is needed for putting on and taking off regular upper body clothing?: None  How much help is needed for taking care of personal grooming?: None  How much help for eating meals?: None  AM-[facility] Inpatient Daily Activity Raw Score: 19  AM-PAC Inpatient ADL T-Scale Score : 40.22  ADL Inpatient CMS 0-100% Score: 42.8  ADL Inpatient CMS G-Code Modifier : CK           SUBJECTIVE:     Ms. Rojelio Jackson states she doesn't usually use anything to walk     Social/Functional Lives With: Spouse  Type of Home: House  Home Layout: Two level, Able to Live on Main level with bedroom/bathroom  Home Access: Stairs to enter with rails  Entrance Stairs - Number of Steps: 3  Entrance Stairs - Rails: Both  Bathroom Shower/Tub: Walk-in shower  Bathroom Toilet: Standard  Receives Help From: Family  ADL Assistance: Needs assistance  Ambulation Assistance: Independent  Transfer Assistance: Independent    OBJECTIVE:     Brody Holland / Yahaira Chowdhury / Jani Sharp: IV    RESTRICTIONS/PRECAUTIONS: PAIN: VITALS / O2:   Pre Treatment:          Post Treatment: no c/o pain during session       Vitals          Oxygen            GROSS EVALUATION: INTACT IMPAIRED   (See Comments)   UE AROM [x] []   UE PROM [x] []   Strength []  Increased weakness     Posture / Balance []  Decreased; HHA   Sensation [x]     Coordination [x]       Tone []       Edema []    Activity Tolerance []  Decreased from baseline     Hand Dominance R [] L []      COGNITION/  PERCEPTION: INTACT IMPAIRED   (See Comments)   Orientation [x]     Vision [x]     Hearing [x]     Cognition  [x]     Perception [x]       MOBILITY: I Mod I S SBA CGA Min Mod Max Total  NT x2 Comments:   Bed Mobility    Rolling [] [] [] [] [] [] [] [] [] [] []    Supine to Sit [] [] [] [] [] [x] [] [] [] [] []    Scooting [] [] [] [] [] [] [] [] [] [] []    Sit to Supine [] [] [] [] [] [x] [] [] [] [] []    Transfers    Sit to Stand [] [] [] [] [] [x] [] [] [] [] []    Bed to Chair [] [] [] [] [] [x] [] [] [] [] [] HHA   Stand to Sit [] [] [] [] [] [x] [] [] [] [] []    Tub/Shower [] [] [] [] [] [] [] [] [] [] []     Toilet [] [] [] [] [] [x] [] [] [] [] []  Standard toilet, HHA to stand, balance    [] [] [] [] [] [] [] [] [] [] []    I=Independent, Mod I=Modified Independent, S=Supervision/Setup, SBA=Standby Assistance, CGA=Contact Guard Assistance, Min=Minimal Assistance, Mod=Moderate Assistance, Max=Maximal Assistance, Total=Total Assistance, NT=Not Tested    ACTIVITIES OF DAILY LIVING: I Mod I S SBA CGA Min Mod Max Total NT Comments   BASIC ADLs:              Upper Body Bathing  [] [] [] [] [] [] [] [] [] []    Lower Body Bathing [] [] [] [] [] [] [] [] [] []    Toileting [] [] [] [] [] [x] [] [] [] [] For hygiene and new brief; sitting and standing   Upper Body Dressing [] [] [x] [] [] [] [] [] [] []    Lower Body Dressing [] [] [] [] [] [x] [] [] [] []    Feeding [] [] [] [] [] [] [] [] [] []    Grooming [] [] [] [] [x] [] [] [] [] [] Cues and standing balance Personal Device Care [] [] [] [] [] [] [] [] [] []    Functional Mobility [] [] [] [] [] [x] [] [] [] [] HHA   I=Independent, Mod I=Modified Independent, S=Supervision/Setup, SBA=Standby Assistance, CGA=Contact Guard Assistance, Min=Minimal Assistance, Mod=Moderate Assistance, Max=Maximal Assistance, Total=Total Assistance, NT=Not Tested    PLAN:   [address] [place] [address] of Care: 3 times/week for duration of hospital stay or until stated goals are met, whichever comes first.    PROBLEM LIST:   (Skilled intervention is medically necessary to address:)  Decreased ADL/Functional Activities  Decreased Activity Tolerance  Decreased Balance  Decreased Cognition  Decreased Gait Ability  Decreased Safety Awareness  Decreased Strength  Decreased Transfer Abilities   INTERVENTIONS PLANNED:  (Benefits and precautions of occupational therapy have been discussed with the patient.)  Self Care Training  Therapeutic Activity  Therapeutic Exercise/HEP  Neuromuscular Re-education  Manual Therapy  Education         TREATMENT:     EVALUATION: LOW COMPLEXITY: (Untimed Charge)    TREATMENT:   Co-Treatment PT/OT necessary due to patient's decreased overall endurance/tolerance levels, as well as need for high level skilled assistance to complete functional transfers/mobility and functional tasks  Self Care (15 minutes): Patient participated in toileting, lower body dressing, and grooming ADLs in unsupported sitting and standing with minimal verbal, manual, and tactile cueing to increase independence, increase activity tolerance, and increase safety awareness. Patient also participated in functional mobility, functional transfer, and adaptive equipment training to increase independence, increase activity tolerance, and increase safety awareness.      TREATMENT GRID:  N/A    AFTER TREATMENT PRECAUTIONS: Bed/Chair Locked, Call light within reach, Chair, Needs within reach, RN notified, and Visitors at bedside    INTERDISCIPLINARY COLLABORATION:  RN/ PCT and PT/ PTA    EDUCATION:  Education Given To: Patient; Family  Education Provided: Role of Therapy;Plan of Care;ADL Adaptive Strategies;Transfer Training;Energy Conservation;Equipment; Fall Prevention Strategies  Education Method: Demonstration;Verbal  Barriers to Learning: None  Education Outcome: Verbalized understanding;Demonstrated understanding;Continued education needed    TOTAL TREATMENT DURATION AND TIME:  Time In: 1125  Time Out: [facility]  Minutes: [address], [facility]

## 2023-03-03 NOTE — PROGRESS NOTES
ACUTE PHYSICAL THERAPY GOALS:   (Developed with and agreed upon by patient and/or caregiver. )    LTG:  (1.)Ms. Adry Saravia will move from supine to sit and sit to supine  in bed with SUPERVISION within 7 treatment day(s). (2.)Ms. Adry Saravia will transfer from bed to chair and chair to bed with SUPERVISION using the least restrictive device within 7 treatment day(s). (3.)Ms. Silva will ambulate with SUPERVISION for 250 feet with the least restrictive device within 7 treatment day(s). ________________________________________________________________________________________________     PHYSICAL THERAPY Initial Assessment and AM  (Link to Caseload Tracking: PT Visit Days : 1  Acknowledge Orders  Time In/Out  PT Charge Capture  Rehab Caseload Tracker    Aracelis Molina is a 80 y.o. female   PRIMARY DIAGNOSIS: Sepsis ([address]  Confusion [R41.0]  Generalized weakness [R53.1]  Sepsis ([address] [A41.9]  Pneumonia of left lower lobe due to infectious organism [J18.9]  Cerebrovascular accident (CVA), unspecified mechanism ([address] [I63.9]  Dementia without behavioral disturbance, psychotic disturbance, mood disturbance, or anxiety, unspecified dementia severity, unspecified dementia type ([address] [F03.90]       Reason for Referral: Generalized Muscle Weakness (M62.81)  Other abnormalities of gait and mobility (R26.89)  Inpatient: Payor: MEDICARE / Plan: MEDICARE PART A AND B / Product Type: *No Product type* /     ASSESSMENT:     REHAB RECOMMENDATIONS:   Recommendation to date pending progress:  Setting:  Home Health Therapy    Equipment:    None     ASSESSMENT:  Ms. Adry Saravia presents with somewhat limited functional mobility from her baseline. She apparently can transfer and ambulated without assist or without assistive device at baseline and has 24/7 sitters. She will benefit from PT to increase her independence with mobility and plans to return home with sitter/family support.  She was supine on contact and agreeable to therapy. She transferred supine to sit with CGA and then scooted with CGA. Sat EOB and donned socks with some assist to maintain sitting balance as she tended to lean backwards in EOB sitting. She stood with CGA and ambulated to the bathroom where she toileted and worked on standing balance while donning brief. She did not want to ambulate in the halls so she walked with CGA in the room with HHA and someone pushing IV pole. Returned supine following therapy and positioned for comfort. Appears to be tired out following mobility.      [address] [facility] [address] [address] AM-PAC 6 Clicks Basic Mobility Inpatient Short Form  AM-PAC Basic Mobility - Inpatient   How much help is needed turning from your back to your side while in a flat bed without using bedrails?: A Little  How much help is needed moving from lying on your back to sitting on the side of a flat bed without using bedrails?: A Little  How much help is needed moving to and from a bed to a chair?: A Little  How much help is needed standing up from a chair using your arms?: A Little  How much help is needed walking in hospital room?: A Little  How much help is needed climbing 3-5 steps with a railing?: A Little  [facility] Inpatient Mobility Raw Score : 18  AM-PAC Inpatient T-Scale Score : 43.63  Mobility Inpatient CMS 0-100% Score: 46.58  Mobility Inpatient CMS G-Code Modifier : CK    SUBJECTIVE:   Ms. Chivo Liu states, \"Okay I will get up\"     Social/Functional Lives With: Spouse  Type of Home: House  ADL Assistance: Needs assistance  Ambulation Assistance: Independent  Transfer Assistance: Independent    OBJECTIVE:     PAIN: Kerline Maljamar / O2: PRECAUTION / Suzzane Modest / Jason Slack:   Pre Treatment:          Post Treatment: 0 Vitals        Oxygen      IV    RESTRICTIONS/PRECAUTIONS:                    GROSS EVALUATION: Intact Impaired (Comments):   AROM [x]     PROM []    Strength [x]     Balance []   fair   Posture [] N/A   Sensation [x]     Coordination [x]      Tone [x]     Edema [] Activity Tolerance []  Limited by fatigue    []      COGNITION/  PERCEPTION: Intact Impaired (Comments):   Orientation [x]     Vision [x]     Hearing [x]     Cognition  [x]       MOBILITY: I Mod I S SBA CGA Min Mod Max Total  NT x2 Comments:   Bed Mobility    Rolling [] [] [] [x] [] [] [] [] [] [] []    Supine to Sit [] [] [] [] [x] [] [] [] [] [] []    Scooting [] [] [] [] [x] [] [] [] [] [] []    Sit to Supine [] [] [] [] [x] [] [] [] [] [] []    Transfers    Sit to Stand [] [] [] [] [] [x] [] [] [] [] []    Bed to Chair [] [] [] [] [] [x] [] [] [] [] []    Stand to Sit [] [] [] [] [x] [] [] [] [] [] []     [] [] [] [] [] [] [] [] [] [] []    I=Independent, Mod I=Modified Independent, S=Supervision, SBA=Standby Assistance, CGA=Contact Guard Assistance,   Min=Minimal Assistance, Mod=Moderate Assistance, Max=Maximal Assistance, Total=Total Assistance, NT=Not Tested    GAIT: I Mod I S SBA CGA Min Mod Max Total  NT x2 Comments:   Level of Assistance [] [] [] [] [x] [x] [] [] [] [] []    Distance 30 feet    DME HHA    Gait Quality Decreased dilip , Decreased step clearance, Decreased step length, and Path deviations     Weightbearing Status      Stairs      I=Independent, Mod I=Modified Independent, S=Supervision, SBA=Standby Assistance, CGA=Contact Guard Assistance,   Min=Minimal Assistance, Mod=Moderate Assistance, Max=Maximal Assistance, Total=Total Assistance, NT=Not Tested    PLAN:   FREQUENCY AND DURATION: Daily for duration of hospital stay or until stated goals are met, whichever comes first.    THERAPY PROGNOSIS: Good    PROBLEM LIST:   (Skilled intervention is medically necessary to address:)  Decreased ADL/Functional Activities  Decreased Activity Tolerance  Decreased Balance  Decreased Gait Ability  Decreased Safety Awareness  Decreased Strength  Decreased Transfer Abilities INTERVENTIONS PLANNED:   (Benefits and precautions of physical therapy have been discussed with the patient.)  Therapeutic Activity  Therapeutic Exercise/HEP  Gait Training  Manual Therapy  Education       TREATMENT:   EVALUATION: LOW COMPLEXITY: (Untimed Charge)    TREATMENT:   Therapeutic Activity (20 Minutes): Therapeutic activity included Rolling, Supine to Sit, Sit to Supine, Scooting, Transfer Training, Ambulation on level ground, Sitting balance , and Standing balance to improve functional Activity tolerance, Balance, Coordination, Mobility, and Strength.     TREATMENT GRID:  N/A    AFTER TREATMENT PRECAUTIONS: Bed, Bed/Chair Locked, Call light within reach, Needs within reach, RN notified, and Side rails x2    INTERDISCIPLINARY COLLABORATION:  RN/ PCT, PT/ PTA, and OT/ [place]    EDUCATION: Education Given To: Patient;Caregiver  Education Provided: Role of Therapy;Plan of Care;Transfer Training;Family Education  Education Method: Demonstration;Verbal  Education Outcome: Continued education needed    TIME IN/OUT:  Time In: 1130  Time Out: [address]  Minutes: [address], PT

## 2023-03-03 NOTE — PROGRESS NOTES
Physician Progress Note      Alen Burciaga  CSN #:                  [id number]  :                       1940  ADMIT DATE:       3/2/2023 4:09 PM  DISCH DATE:  RESPONDING  PROVIDER #:        Tl Tom DO          QUERY TEXT:    Patient admitted with sepsis, noted to have atrial fibrillation and is   maintained on Xarelto. If possible, please document in progress notes and   discharge summary if you are evaluating and/or treating any of the following: The medical record reflects the following:  Risk Factors: 80 yr female, Afib, HTn  Clinical Indicators: 80year old female, CHADsVASC score at least a 4  Treatment: Xarelto. Options provided:  -- Secondary hypercoagulable state in a patient with atrial fibrillation  -- Other - I will add my own diagnosis  -- Disagree - Not applicable / Not valid  -- Disagree - Clinically unable to determine / Unknown  -- Refer to Clinical Documentation Reviewer    PROVIDER RESPONSE TEXT:    This patient has secondary hypercoagulable state in a patient with atrial   fibrillation.     Query created by: Maggie Waldron on 3/3/2023 9:13 AM      Electronically signed by:  Tl Tom DO 3/3/2023 10:54 AM

## 2023-03-04 LAB
ALBUMIN SERPL-MCNC: 2.3 G/DL (ref 3.2–4.6)
ALBUMIN/GLOB SERPL: 0.7 (ref 0.4–1.6)
ALP SERPL-CCNC: 113 U/L (ref 50–130)
ALT SERPL-CCNC: 22 U/L (ref 12–65)
ANION GAP SERPL CALC-SCNC: 5 MMOL/L (ref 2–11)
AST SERPL-CCNC: 26 U/L (ref 15–37)
BASOPHILS # BLD: 0 K/UL (ref 0–0.2)
BASOPHILS NFR BLD: 0 % (ref 0–2)
BILIRUB SERPL-MCNC: 0.7 MG/DL (ref 0.2–1.1)
BUN SERPL-MCNC: 10 MG/DL (ref 8–23)
CALCIUM SERPL-MCNC: 8.4 MG/DL (ref 8.3–10.4)
CHLORIDE SERPL-SCNC: 114 MMOL/L (ref 101–110)
CO2 SERPL-SCNC: 21 MMOL/L (ref 21–32)
CREAT SERPL-MCNC: 0.52 MG/DL (ref 0.6–1)
DIFFERENTIAL METHOD BLD: ABNORMAL
EOSINOPHIL # BLD: 0.1 K/UL (ref 0–0.8)
EOSINOPHIL NFR BLD: 1 % (ref 0.5–7.8)
ERYTHROCYTE [DISTWIDTH] IN BLOOD BY AUTOMATED COUNT: 13.3 % (ref 11.9–14.6)
GLOBULIN SER CALC-MCNC: 3.4 G/DL (ref 2.8–4.5)
GLUCOSE SERPL-MCNC: 110 MG/DL (ref 65–100)
HCT VFR BLD AUTO: 33.5 % (ref 35.8–46.3)
HGB BLD-MCNC: 11.2 G/DL (ref 11.7–15.4)
IMM GRANULOCYTES # BLD AUTO: 0.1 K/UL (ref 0–0.5)
IMM GRANULOCYTES NFR BLD AUTO: 1 % (ref 0–5)
LYMPHOCYTES # BLD: 1.3 K/UL (ref 0.5–4.6)
LYMPHOCYTES NFR BLD: 9 % (ref 13–44)
MAGNESIUM SERPL-MCNC: 2.1 MG/DL (ref 1.8–2.4)
MCH RBC QN AUTO: 32 PG (ref 26.1–32.9)
MCHC RBC AUTO-ENTMCNC: 33.4 G/DL (ref 31.4–35)
MCV RBC AUTO: 95.7 FL (ref 82–102)
MONOCYTES # BLD: 1.1 K/UL (ref 0.1–1.3)
MONOCYTES NFR BLD: 8 % (ref 4–12)
NEUTS SEG # BLD: 11.4 K/UL (ref 1.7–8.2)
NEUTS SEG NFR BLD: 82 % (ref 43–78)
NRBC # BLD: 0 K/UL (ref 0–0.2)
PLATELET # BLD AUTO: 195 K/UL (ref 150–450)
PMV BLD AUTO: 11.6 FL (ref 9.4–12.3)
POTASSIUM SERPL-SCNC: 3.3 MMOL/L (ref 3.5–5.1)
PROT SERPL-MCNC: 5.7 G/DL (ref 6.3–8.2)
RBC # BLD AUTO: 3.5 M/UL (ref 4.05–5.2)
SODIUM SERPL-SCNC: 140 MMOL/L (ref 133–143)
WBC # BLD AUTO: 14 K/UL (ref 4.3–11.1)

## 2023-03-04 PROCEDURE — 80053 COMPREHEN METABOLIC PANEL: CPT

## 2023-03-04 PROCEDURE — 94761 N-INVAS EAR/PLS OXIMETRY MLT: CPT

## 2023-03-04 PROCEDURE — 6370000000 HC RX 637 (ALT 250 FOR IP): Performed by: INTERNAL MEDICINE

## 2023-03-04 PROCEDURE — 2700000000 HC OXYGEN THERAPY PER DAY

## 2023-03-04 PROCEDURE — 1100000000 HC RM PRIVATE

## 2023-03-04 PROCEDURE — 94760 N-INVAS EAR/PLS OXIMETRY 1: CPT

## 2023-03-04 PROCEDURE — 85025 COMPLETE CBC W/AUTO DIFF WBC: CPT

## 2023-03-04 PROCEDURE — A4216 STERILE WATER/SALINE, 10 ML: HCPCS | Performed by: INTERNAL MEDICINE

## 2023-03-04 PROCEDURE — 6360000002 HC RX W HCPCS: Performed by: INTERNAL MEDICINE

## 2023-03-04 PROCEDURE — 83735 ASSAY OF MAGNESIUM: CPT

## 2023-03-04 PROCEDURE — 2500000003 HC RX 250 WO HCPCS: Performed by: INTERNAL MEDICINE

## 2023-03-04 PROCEDURE — 97530 THERAPEUTIC ACTIVITIES: CPT

## 2023-03-04 PROCEDURE — 2580000003 HC RX 258: Performed by: INTERNAL MEDICINE

## 2023-03-04 PROCEDURE — 36415 COLL VENOUS BLD VENIPUNCTURE: CPT

## 2023-03-04 RX ORDER — POTASSIUM CHLORIDE 20 MEQ/1
20 TABLET, EXTENDED RELEASE ORAL
Status: COMPLETED | OUTPATIENT
Start: 2023-03-04 | End: 2023-03-04

## 2023-03-04 RX ADMIN — CEFTRIAXONE 1000 MG: 1 INJECTION, POWDER, FOR SOLUTION INTRAMUSCULAR; INTRAVENOUS at 18:27

## 2023-03-04 RX ADMIN — RIVAROXABAN 20 MG: 20 TABLET, FILM COATED ORAL at 08:46

## 2023-03-04 RX ADMIN — ATORVASTATIN CALCIUM 80 MG: 40 TABLET, FILM COATED ORAL at 20:33

## 2023-03-04 RX ADMIN — QUETIAPINE FUMARATE 25 MG: 25 TABLET ORAL at 20:32

## 2023-03-04 RX ADMIN — CYANOCOBALAMIN TAB 1000 MCG 1000 MCG: 1000 TAB at 08:46

## 2023-03-04 RX ADMIN — GUAIFENESIN 600 MG: 600 TABLET, EXTENDED RELEASE ORAL at 08:46

## 2023-03-04 RX ADMIN — SODIUM CHLORIDE: 9 INJECTION, SOLUTION INTRAVENOUS at 18:31

## 2023-03-04 RX ADMIN — VERAPAMIL HYDROCHLORIDE 180 MG: 180 TABLET, FILM COATED, EXTENDED RELEASE ORAL at 20:32

## 2023-03-04 RX ADMIN — GUAIFENESIN 600 MG: 600 TABLET, EXTENDED RELEASE ORAL at 20:33

## 2023-03-04 RX ADMIN — FAMOTIDINE 20 MG: 10 INJECTION, SOLUTION INTRAVENOUS at 08:44

## 2023-03-04 RX ADMIN — DONEPEZIL HYDROCHLORIDE 10 MG: 5 TABLET ORAL at 20:32

## 2023-03-04 RX ADMIN — POTASSIUM CHLORIDE 20 MEQ: 20 TABLET, EXTENDED RELEASE ORAL at 14:24

## 2023-03-04 RX ADMIN — POTASSIUM CHLORIDE 20 MEQ: 20 TABLET, EXTENDED RELEASE ORAL at 12:16

## 2023-03-04 RX ADMIN — AZITHROMYCIN MONOHYDRATE 500 MG: 500 INJECTION, POWDER, LYOPHILIZED, FOR SOLUTION INTRAVENOUS at 20:33

## 2023-03-04 ASSESSMENT — PAIN SCALES - GENERAL: PAINLEVEL_OUTOF10: 0

## 2023-03-04 NOTE — PROGRESS NOTES
Hospitalist Progress Note   Admit Date:  3/2/2023  4:09 PM   Name:  Chandana Mccain   Age:  80 y.o. Sex:  female  :  1940   MRN:  [id number]   Room:  [facility]/    Presenting Complaint: Facial Droop     Reason(s) for Admission: Confusion [R41.0]  Generalized weakness [R53.1]  Sepsis ([facility] [address] [A41.9]  Pneumonia of left lower lobe due to infectious organism [J18.9]  Cerebrovascular accident (CVA), unspecified mechanism ([facility] [address] [I63.9]  Dementia without behavioral disturbance, psychotic disturbance, mood disturbance, or anxiety, unspecified dementia severity, unspecified dementia type [facility]) [F03.90]     Hospital Course:     From history and physical HPI:  Chandana Mccain is a 80 y.o. female with medical history of paroxysmal atrial fibrillation on Xarelto and verapamil rate control, hypertension and dementia with behavioral abnormalities on Seroquel nocturnally. She has been visiting her  in the hospital with a hernia recently and has had fatigue and is staying with family. Family reports that patient was difficult to arouse this morning weak and could not stand. Noticed increased temp complained of left shoulder plain and family felt possible left-sided weakness and possible left facial droop. Droop not felt to be significant by ER or neurology and initial work-up for code stroke negative. Patient found to have sepsis with fever leukocytosis and a new left lower lobe pneumonia. Neurology feels symptoms likely related to her baseline dementia with acute infectious process. Patient after receiving IV fluids somewhat more alert conversant but alert to person only. Pleasant and cooperative and maintains social graces. Discussed with daughter and son at bedside and patient to be admitted and will honor DO NOT RESUSCITATE status. Patient has had outpatient palliative care consultations.   Patient does have behavioral abnormality with her dementia with nocturnal confusion and will continue her nocturnal Seroquel but family expects she could have aggravation of behavioral abnormalities with change in environment during hospitalization. Discussed planned antibiotics supportive care and continued home meds including anticoagulation and rate control regarding atrial fibrillation. No dysuria. Serum creatinine okay. Lactic acid pending. A-fib apparently paroxysmal and EKG was sinus mechanism noted. Subjective & 24hr Events (03/04/23): Some confusion last evening and removed IV but overall behavior okay. Continues to improve clinically. White blood count not significantly improved. Follow-up procalcitonin level pending for tomorrow. Appears to be responding clinically to empiric antibiotics directed towards community-acquired pneumonia. With aggressive IV hydration serum potassium is low and will be supplemented. When clinically stable short-term rehab planned. Blood pressure overall improved received 180 mg dose of sustained-release verapamil last evening and ACE inhibitor continues to be hold. Home dose of verapamil was 240 mg daily. Assessment & Plan:      Principal Problem:    Sepsis ([address]  Plan: Secondary to pneumonia. We will utilize sepsis protocol. Risk-benefit of aggressive IV hydration given patient age and status and BP stable. Creatinine normal.  Ceftriaxone and azithromycin. Blood cultures. Further intervention pending initial response and clinical stability  3/3--continue same care-continue current rate IV fluids at least another 24 hours and then consider attenuate given age if BP stable   3/4--clinically improving-continue same care-May attenuate IV fluids     Active Problems:    Left lower lobe pneumonia  Plan: Above antibiotics, mucolytic, as needed bronchodilators. 3/3No significant productive cough-consider sputum evaluation but has received multiple doses of antibiotics.-Continue ceftriaxone and azithromycin full course.   Continue pulmonary toilet, supportive care. 3/4--blood cultures no growth to date-continue supportive care and current antibiotics. Dementia with behavioral disturbance  Plan: Continue home meds including Aricept and Seroquel. Monitor for behavioral disturbance. 3/3--continue home medications-did well last evening-monitor  3/4--some confusion last evening per daughter      Essential hypertension  3/3--- lisinopril held-slowly restart as clinically improves-monitor  3/4--continue to hold ACE inhibitor-tolerated lower dose long-acting verapamil. Resume home meds as BP improves with clinical improvement-monitor for now. Paroxysmal atrial fibrillation (HCC)  Plan: Continue home verapamil and anticoagulation. Xarelto. 3/3--- verapamil held last evening. Try restart tonight at lower dose 180 mg to replace 240 mg dosage and observe. She has paroxysmal A-fib-hold if BP remains low. Continue IV hydration. 3/4--- continue same. PT/OT evals and PPD needed/ordered? Yes  Diet: Diet NPO Exceptions are: Sips of Water with Meds, Sips of Clear Liquids  VTE prophylaxis: Already on anticoagulation  Code status: DNR               Hospital Problems:  Principal Problem:    Sepsis ([facility] [address]  Active Problems:    Left lower lobe pneumonia    Dementia with behavioral disturbance    Dysphagia    Essential hypertension    Paroxysmal atrial fibrillation (HCC)  Resolved Problems:    * No resolved hospital problems.  *      Objective:   Patient Vitals for the past 24 hrs:   Temp Pulse Resp BP SpO2   03/04/23 1126 98.2 °F (36.8 °C) 73 18 128/72 99 %   03/04/23 0756 98.6 °F (37 °C) 69 20 120/67 95 %   03/04/23 0355 98.4 °F (36.9 °C) 68 18 (!) 99/58 92 %   03/04/23 0005 98.6 °F (37 °C) 73 20 (!) 93/56 91 %   03/03/23 2343 -- 75 -- -- 91 %   03/03/23 2342 98.4 °F (36.9 °C) 73 20 (!) 90/58 (!) 89 %   03/03/23 2057 -- 82 18 -- 92 %   03/03/23 2029 99.7 °F (37.6 °C) 98 24 (!) 93/47 93 %   03/03/23 1600 99.5 °F (37.5 °C) 88 16 (!) 141/79 95 % 03/03/23 1208 98.8 °F (37.1 °C) 81 16 125/69 92 %         Oxygen Therapy  SpO2: 99 %  Pulse Oximeter Device Mode: Intermittent  O2 Device: Nasal cannula  O2 Flow Rate (L/min): 5 L/min    Estimated body mass index is 20.48 kg/m² as calculated from the following:    Height as of this encounter: 4' 10\" (1.473 m). Weight as of this encounter: 98 lb (44.5 kg). Intake/Output Summary (Last 24 hours) at 3/4/2023 1126  Last data filed at 3/4/2023 0756  Gross per 24 hour   Intake 60 ml   Output --   Net 60 ml           Physical Exam:   Blood pressure 128/72, pulse 73, temperature 98.2 °F (36.8 °C), temperature source Oral, resp. rate 18, height 4' 10\" (1.473 m), weight 98 lb (44.5 kg), SpO2 99 %. General-alert person. Pleasant and cooperative. Spoke with daughter at bedside and patient did have some confusion last evening. Eyes-conjunctive are clear pupils equal round react to light extraocular muscles intact    Ears-no deformity-no drainage    Heart-appears fairly regular at time of exam with rate controlled. No JVD grossly  Lungs-decreased Rales at the bases-particularly left base compared to admission. No wheezing. Good air movement. Abdomen-soft, nontender, no gross percussible organomegaly. No gross distention. Bowel sounds present all 4 quadrants    Extremities-no significant unilateral lower extremity edema, moves all extremities symmetrically. Neurologic-cranial nerves II through XII grossly intact, no focal motor deficits grossly.   No obvious tremor        I have personally reviewed labs and tests:  Recent Labs:  Recent Results (from the past 48 hour(s))   EKG 12 Lead    Collection Time: 03/02/23  4:20 PM   Result Value Ref Range    Ventricular Rate 78 BPM    Atrial Rate 81 BPM    P-R Interval 163 ms    QRS Duration 76 ms    Q-T Interval 488 ms    QTc Calculation (Bazett) 556 ms    P Axis 51 degrees    R Axis 10 degrees    T Axis 29 degrees    Diagnosis Sinus rhythm    POCT Glucose Collection Time: 03/02/23  4:22 PM   Result Value Ref Range    POC Glucose 124 (H) 65 - 100 mg/dL    Performed by: Nia    CBC with Auto Differential    Collection Time: 03/02/23  4:23 PM   Result Value Ref Range    WBC 18.3 (H) 4.3 - 11.1 K/uL    RBC 4.12 4.05 - 5.2 M/uL    Hemoglobin 13.5 11.7 - 15.4 g/dL    Hematocrit 39.2 35.8 - 46.3 %    MCV 95.1 82.0 - 102.0 FL    MCH 32.8 26.1 - 32.9 PG    MCHC 34.4 31.4 - 35.0 g/dL    RDW 12.9 11.9 - 14.6 %    Platelets 024 538 - 110 K/uL    MPV 11.1 9.4 - 12.3 FL    nRBC 0.00 0.0 - 0.2 K/uL    Differential Type AUTOMATED      Seg Neutrophils 87 (H) 43 - 78 %    Lymphocytes 5 (L) 13 - 44 %    Monocytes 7 4.0 - 12.0 %    Eosinophils % 1 0.5 - 7.8 %    Basophils 0 0.0 - 2.0 %    Immature Granulocytes 1 0.0 - 5.0 %    Segs Absolute 15.9 (H) 1.7 - 8.2 K/UL    Absolute Lymph # 0.9 0.5 - 4.6 K/UL    Absolute Mono # 1.2 0.1 - 1.3 K/UL    Absolute Eos # 0.1 0.0 - 0.8 K/UL    Basophils Absolute 0.0 0.0 - 0.2 K/UL    Absolute Immature Granulocyte 0.2 0.0 - 0.5 K/UL   Comprehensive Metabolic Panel w/ Reflex to MG    Collection Time: 03/02/23  4:23 PM   Result Value Ref Range    Sodium 136 133 - 143 mmol/L    Potassium 3.8 3.5 - 5.1 mmol/L    Chloride 104 101 - 110 mmol/L    CO2 26 21 - 32 mmol/L    Anion Gap 6 2 - 11 mmol/L    Glucose 136 (H) 65 - 100 mg/dL    BUN 18 8 - 23 MG/DL    Creatinine 0.92 0.6 - 1.0 MG/DL    Est, Glom Filt Rate >60 >60 ml/min/1.73m2    Calcium 9.6 8.3 - 10.4 MG/DL    Total Bilirubin 1.2 (H) 0.2 - 1.1 MG/DL    ALT 27 12 - 65 U/L    AST 19 15 - 37 U/L    Alk Phosphatase 104 50 - 136 U/L    Total Protein 7.2 6.3 - 8.2 g/dL    Albumin 3.3 3.2 - 4.6 g/dL    Globulin 3.9 2.8 - 4.5 g/dL    Albumin/Globulin Ratio 0.8 0.4 - 1.6     Troponin    Collection Time: 03/02/23  4:23 PM   Result Value Ref Range    Troponin, High Sensitivity 18.5 (H) 0 - 14 pg/mL   Protime-INR    Collection Time: 03/02/23  4:23 PM   Result Value Ref Range    Protime 16.4 (H) 12.6 - 14.3 sec    INR 1.3     POCT Glucose    Collection Time: 03/02/23  4:25 PM   Result Value Ref Range    Glucose 124 mg/dL   POCT INR    Collection Time: 03/02/23  4:25 PM   Result Value Ref Range    POC Protime 17.1 (H) 9.6 - 11.6 SECS    POC INR 1.5 (H) 0.9 - 1.2     Urinalysis    Collection Time: 03/02/23  5:31 PM   Result Value Ref Range    Color, UA YELLOW/STRAW      Appearance CLEAR      Specific Gravity, UA 1.028 (H) 1.001 - 1.023      pH, Urine 6.0 5.0 - 9.0      Protein, UA 30 (A) NEG mg/dL    Glucose, UA Negative mg/dL    Ketones, Urine Negative NEG mg/dL    Bilirubin Urine Negative NEG      Blood, Urine Negative NEG      Urobilinogen, Urine 1.0 0.2 - 1.0 EU/dL    Nitrite, Urine Negative NEG      Leukocyte Esterase, Urine Negative NEG      WBC, UA 0-3 0 /hpf    RBC, UA 0-3 0 /hpf    Epithelial Cells UA 0-3 0 /hpf    BACTERIA, URINE 0 0 /hpf   Culture, Urine    Collection Time: 03/02/23  5:31 PM    Specimen: Urine, clean catch    URINE   Result Value Ref Range    Special Requests NO SPECIAL REQUESTS      Culture        No growth after short period of incubation. Further results to follow after overnight incubation.    Culture, Blood 1    Collection Time: 03/02/23  5:53 PM    Specimen: Blood   Result Value Ref Range    Special Requests LEFT  Antecubital        Culture NO GROWTH 2 DAYS     Lactic Acid    Collection Time: 03/02/23  5:53 PM   Result Value Ref Range    Lactic Acid, Plasma 2.2 (H) 0.4 - 2.0 MMOL/L   COVID-19, Rapid    Collection Time: 03/02/23  5:53 PM    Specimen: Nasopharyngeal   Result Value Ref Range    Source Nasopharyngeal      SARS-CoV-2, Rapid Not detected NOTD     Culture, Blood 1    Collection Time: 03/02/23  6:11 PM    Specimen: Blood   Result Value Ref Range    Special Requests LEFT  ARM        Culture NO GROWTH 2 DAYS     Lactate, Sepsis    Collection Time: 03/02/23  7:13 PM   Result Value Ref Range    Lactic Acid, Sepsis 1.3 0.4 - 2.0 MMOL/L   CBC with Auto Differential    Collection Time: 03/03/23  6:19 AM   Result Value Ref Range    WBC 13.3 (H) 4.3 - 11.1 K/uL    RBC 3.47 (L) 4.05 - 5.2 M/uL    Hemoglobin 11.2 (L) 11.7 - 15.4 g/dL    Hematocrit 33.5 (L) 35.8 - 46.3 %    MCV 96.5 82.0 - 102.0 FL    MCH 32.3 26.1 - 32.9 PG    MCHC 33.4 31.4 - 35.0 g/dL    RDW 13.2 11.9 - 14.6 %    Platelets 845 690 - 771 K/uL    MPV 11.5 9.4 - 12.3 FL    nRBC 0.00 0.0 - 0.2 K/uL    Differential Type AUTOMATED      Seg Neutrophils 81 (H) 43 - 78 %    Lymphocytes 11 (L) 13 - 44 %    Monocytes 7 4.0 - 12.0 %    Eosinophils % 0 (L) 0.5 - 7.8 %    Basophils 0 0.0 - 2.0 %    Immature Granulocytes 1 0.0 - 5.0 %    Segs Absolute 10.8 (H) 1.7 - 8.2 K/UL    Absolute Lymph # 1.5 0.5 - 4.6 K/UL    Absolute Mono # 0.9 0.1 - 1.3 K/UL    Absolute Eos # 0.0 0.0 - 0.8 K/UL    Basophils Absolute 0.0 0.0 - 0.2 K/UL    Absolute Immature Granulocyte 0.1 0.0 - 0.5 K/UL   Procalcitonin    Collection Time: 03/03/23  6:19 AM   Result Value Ref Range    Procalcitonin 2.84 (H) 0.00 - 0.49 ng/mL   Hemoglobin A1c    Collection Time: 03/03/23  6:19 AM   Result Value Ref Range    Hemoglobin A1C 5.7 (H) 4.8 - 5.6 %    eAG 117 mg/dL   Lipid Panel    Collection Time: 03/03/23  6:19 AM   Result Value Ref Range    Cholesterol, Total 111 MG/DL    Triglycerides 72 35 - 150 MG/DL    HDL 55 40 - 60 MG/DL    LDL Calculated 41.6 <100 MG/DL    VLDL Cholesterol Calculated 14.4 6.0 - 23.0 MG/DL    Chol/HDL Ratio 2.0 <200     Comprehensive Metabolic Panel w/ Reflex to MG    Collection Time: 03/03/23  6:19 AM   Result Value Ref Range    Sodium 138 133 - 143 mmol/L    Potassium 3.6 3.5 - 5.1 mmol/L    Chloride 112 (H) 101 - 110 mmol/L    CO2 22 21 - 32 mmol/L    Anion Gap 4 2 - 11 mmol/L    Glucose 103 (H) 65 - 100 mg/dL    BUN 15 8 - 23 MG/DL    Creatinine 0.53 (L) 0.6 - 1.0 MG/DL    Est, Glom Filt Rate >60 >60 ml/min/1.73m2    Calcium 8.0 (L) 8.3 - 10.4 MG/DL    Total Bilirubin 0.9 0.2 - 1.1 MG/DL    ALT 23 12 - 65 U/L    AST 23 15 - 37 U/L    Alk Phosphatase 81 50 - 136 U/L    Total Protein 5.4 (L) 6.3 - 8.2 g/dL    Albumin 2.3 (L) 3.2 - 4.6 g/dL    Globulin 3.1 2.8 - 4.5 g/dL    Albumin/Globulin Ratio 0.7 0.4 - 1.6     CBC with Auto Differential    Collection Time: 03/04/23  7:28 AM   Result Value Ref Range    WBC 14.0 (H) 4.3 - 11.1 K/uL    RBC 3.50 (L) 4.05 - 5.2 M/uL    Hemoglobin 11.2 (L) 11.7 - 15.4 g/dL    Hematocrit 33.5 (L) 35.8 - 46.3 %    MCV 95.7 82.0 - 102.0 FL    MCH 32.0 26.1 - 32.9 PG    MCHC 33.4 31.4 - 35.0 g/dL    RDW 13.3 11.9 - 14.6 %    Platelets 344 229 - 353 K/uL    MPV 11.6 9.4 - 12.3 FL    nRBC 0.00 0.0 - 0.2 K/uL    Differential Type AUTOMATED      Seg Neutrophils 82 (H) 43 - 78 %    Lymphocytes 9 (L) 13 - 44 %    Monocytes 8 4.0 - 12.0 %    Eosinophils % 1 0.5 - 7.8 %    Basophils 0 0.0 - 2.0 %    Immature Granulocytes 1 0.0 - 5.0 %    Segs Absolute 11.4 (H) 1.7 - 8.2 K/UL    Absolute Lymph # 1.3 0.5 - 4.6 K/UL    Absolute Mono # 1.1 0.1 - 1.3 K/UL    Absolute Eos # 0.1 0.0 - 0.8 K/UL    Basophils Absolute 0.0 0.0 - 0.2 K/UL    Absolute Immature Granulocyte 0.1 0.0 - 0.5 K/UL   Comprehensive Metabolic Panel w/ Reflex to MG    Collection Time: 03/04/23  7:28 AM   Result Value Ref Range    Sodium 140 133 - 143 mmol/L    Potassium 3.3 (L) 3.5 - 5.1 mmol/L    Chloride 114 (H) 101 - 110 mmol/L    CO2 21 21 - 32 mmol/L    Anion Gap 5 2 - 11 mmol/L    Glucose 110 (H) 65 - 100 mg/dL    BUN 10 8 - 23 MG/DL    Creatinine 0.52 (L) 0.6 - 1.0 MG/DL    Est, Glom Filt Rate >60 >60 ml/min/1.73m2    Calcium 8.4 8.3 - 10.4 MG/DL    Total Bilirubin 0.7 0.2 - 1.1 MG/DL    ALT 22 12 - 65 U/L    AST 26 15 - 37 U/L    Alk Phosphatase 113 50 - 130 U/L    Total Protein 5.7 (L) 6.3 - 8.2 g/dL    Albumin 2.3 (L) 3.2 - 4.6 g/dL    Globulin 3.4 2.8 - 4.5 g/dL    Albumin/Globulin Ratio 0.7 0.4 - 1.6     Magnesium    Collection Time: 03/04/23  7:28 AM   Result Value Ref Range    Magnesium 2.1 1.8 - 2.4 mg/dL       I have personally reviewed imaging studies:  Other Studies:  XR CHEST PORTABLE   Final Result   Left lower lobe pneumonia         CT HEAD WO CONTRAST   Final Result      1. No acute cranial abnormality. 2.  Stable senescent changes, as above.              Current Meds:  Current Facility-Administered Medications   Medication Dose Route Frequency    potassium chloride (KLOR-CON M) extended release tablet 20 mEq  20 mEq Oral Q2H    verapamil (CALAN SR) extended release tablet 180 mg  180 mg Oral Nightly    0.9 % sodium chloride infusion   IntraVENous Continuous    sodium chloride flush 0.9 % injection 5-40 mL  5-40 mL IntraVENous 2 times per day    sodium chloride flush 0.9 % injection 5-40 mL  5-40 mL IntraVENous PRN    0.9 % sodium chloride infusion   IntraVENous PRN    famotidine (PEPCID) 20 mg in sodium chloride (PF) 0.9 % 10 mL injection  20 mg IntraVENous Daily    aluminum & magnesium hydroxide-simethicone (MAALOX) 200-200-20 MG/5ML suspension 30 mL  30 mL Oral Q6H PRN    cefTRIAXone (ROCEPHIN) 1,000 mg in sodium chloride 0.9 % 50 mL IVPB (mini-bag)  1,000 mg IntraVENous Q24H    And    azithromycin (ZITHROMAX) 500 mg in sodium chloride 0.9 % 250 mL IVPB ([id number])  500 mg IntraVENous Q24H    vitamin B-12 (CYANOCOBALAMIN) tablet 1,000 mcg  1,000 mcg Oral Daily    donepezil (ARICEPT) tablet 10 mg  10 mg Oral Nightly    fluticasone (FLONASE) 50 MCG/ACT nasal spray 1 spray  1 spray Each Nostril Daily PRN    [Held by provider] lisinopril (PRINIVIL;ZESTRIL) tablet 20 mg  20 mg Oral Daily    QUEtiapine (SEROQUEL) tablet 25 mg  25 mg Oral Nightly    rivaroxaban (XARELTO) tablet 20 mg  20 mg Oral Daily with breakfast    acetaminophen (TYLENOL) tablet 650 mg  650 mg Oral Q4H PRN    Or    acetaminophen (TYLENOL) suppository 650 mg  650 mg Rectal Q4H PRN    ondansetron (ZOFRAN-ODT) disintegrating tablet 4 mg  4 mg Oral Q8H PRN    Or    ondansetron (ZOFRAN) injection 4 mg  4 mg IntraVENous Q6H PRN    polyethylene glycol (GLYCOLAX) packet 17 g  17 g Oral Daily PRN    bisacodyl (DULCOLAX) suppository 10 mg  10 mg Rectal Daily PRN    atorvastatin (LIPITOR) tablet 80 mg  80 mg Oral Nightly    labetalol (NORMODYNE;TRANDATE) injection 10 mg  10 mg IntraVENous Q10 Min PRN    guaiFENesin (MUCINEX) extended release tablet 600 mg  600 mg Oral BID       Signed:  Lui Lane DO    Part of this note may have been written by using a voice dictation software. The note has been proof read but may still contain some grammatical/other typographical errors.

## 2023-03-04 NOTE — CARE COORDINATION
SW familiar with patient and family. Recommendation for home health. Patient's daughter works for [employer], preference is for [facility]. Referral sent for PT/OT/RN and CNA. Attempted to call daughter x's 2.      Mamta Ruffin LMSW    [address] [facility]

## 2023-03-04 NOTE — CASE COMMUNICATION
This LMSW received a return call from pt's daughter from message left yesterday. Answered her questions about levels of care and funding for placement options. Also provided her with the contact for the local \"[employer] [facility]\" service to assist her with couple's options for a long term placement for pt and her spouse if they are not able to live in their home without 24/7 care support. Spouse is also hospitalized and has been recommended for SNF rehab placement. Updated daughter to contact CM coworker  assigned to pt now for continued care planning.

## 2023-03-04 NOTE — PROGRESS NOTES
ACUTE PHYSICAL THERAPY GOALS:   (Developed with and agreed upon by patient and/or caregiver. )    LTG:  (1.)Ms. Chivo Liu will move from supine to sit and sit to supine  in bed with SUPERVISION within 7 treatment day(s). (2.)Ms. Chivo Liu will transfer from bed to chair and chair to bed with SUPERVISION using the least restrictive device within 7 treatment day(s). (3.)Ms. Silva will ambulate with SUPERVISION for 250 feet with the least restrictive device within 7 treatment day(s). ________________________________________________________________________________________________     PHYSICAL THERAPY Initial Assessment and AM  (Link to Caseload Tracking: PT Visit Days : 2  Acknowledge Orders  Time In/Out  PT Charge Capture  Rehab Caseload Tracker    Rosi Francis is a 80 y.o. female   PRIMARY DIAGNOSIS: Sepsis ([address]  Confusion [R41.0]  Generalized weakness [R53.1]  Sepsis ([address] [A41.9]  Pneumonia of left lower lobe due to infectious organism [J18.9]  Cerebrovascular accident (CVA), unspecified mechanism ([address] [I63.9]  Dementia without behavioral disturbance, psychotic disturbance, mood disturbance, or anxiety, unspecified dementia severity, unspecified dementia type ([address] [F03.90]       Reason for Referral: Generalized Muscle Weakness (M62.81)  Other abnormalities of gait and mobility (R26.89)  Inpatient: Payor: MEDICARE / Plan: MEDICARE PART A AND B / Product Type: *No Product type* /     ASSESSMENT:     REHAB RECOMMENDATIONS:   Recommendation to date pending progress:  Setting:  Home Health Therapy    Equipment:    Rolling Walker     ASSESSMENT:  Ms. Chivo Liu presents with somewhat limited functional mobility from her baseline. She apparently can transfer and ambulated without assist or without assistive device at baseline and has 24/7 sitters. She will benefit from PT to increase her independence with mobility and plans to return home with sitter/family support.  She was supine on contact and agreeable to therapy. 3/4:  Pt now transferring with CGA and ambulating [de-identified]' with CGA-min A and no AD. She is somewhat unsteady and consistently reaches out for UE assist when ambulating. She will benefit from RW at home if she does not already have one. Pt unable to recall if she has a RW at home or not. Will continue to follow. [facility] [address] [address] AM-PAC 6 Clicks Basic Mobility Inpatient Short Form  AM-PAC Basic Mobility - Inpatient   How much help is needed turning from your back to your side while in a flat bed without using bedrails?: A Little  How much help is needed moving from lying on your back to sitting on the side of a flat bed without using bedrails?: A Little  How much help is needed moving to and from a bed to a chair?: A Little  How much help is needed standing up from a chair using your arms?: A Little  How much help is needed walking in hospital room?: A Little  How much help is needed climbing 3-5 steps with a railing?: A Little  [facility] Inpatient Mobility Raw Score : 18  AM-PAC Inpatient T-Scale Score : 43.63  Mobility Inpatient CMS 0-100% Score: 46.58  Mobility Inpatient CMS G-Code Modifier : CK    SUBJECTIVE:   Ms. Gracia Joseph states, \"I'd like to get out of bed. \"     Social/Functional Lives With: Spouse  Type of Home: House  Home Layout: Two level, Able to Live on Main level with bedroom/bathroom  Home Access: Stairs to enter with rails  Entrance Stairs - Number of Steps: 3  Entrance Stairs - Rails: Both  Bathroom Shower/Tub: Walk-in shower  Bathroom Toilet: Standard  Receives Help From: Family  ADL Assistance: Needs assistance  Ambulation Assistance: Independent  Transfer Assistance: Independent    OBJECTIVE:     PAIN: [place] / O2: Santosh Ray / Eliana Barber / Maxwell Dawley:   Pre Treatment:   Pain Assessment: None - Denies Pain      Post Treatment: 0 Vitals        Oxygen      IV    RESTRICTIONS/PRECAUTIONS:  Restrictions/Precautions: Fall Risk                 GROSS EVALUATION: Intact Impaired (Comments): AROM [x]     PROM []    Strength [x]     Balance []   fair   Posture [] N/A   Sensation [x]     Coordination [x]      Tone [x]     Edema []    Activity Tolerance []  Limited by fatigue    []      COGNITION/  PERCEPTION: Intact Impaired (Comments):   Orientation [x]     Vision [x]     Hearing [x]     Cognition  [x]       MOBILITY: I Mod I S SBA CGA Min Mod Max Total  NT x2 Comments:   Bed Mobility    Rolling [] [] [] [x] [] [] [] [] [] [] []    Supine to Sit [] [] [] [x] [] [] [] [] [] [] []    Scooting [] [] [] [x] [] [] [] [] [] [] []    Sit to Supine [] [] [] [] [] [] [] [] [] [x] []    Transfers    Sit to Stand [] [] [] [] [x] [x] [] [] [] [] []    Bed to Chair [] [] [] [] [] [x] [] [] [] [] []    Stand to Sit [] [] [] [] [x] [] [] [] [] [] []     [] [] [] [] [] [] [] [] [] [] []    I=Independent, Mod I=Modified Independent, S=Supervision, SBA=Standby Assistance, CGA=Contact Guard Assistance,   Min=Minimal Assistance, Mod=Moderate Assistance, Max=Maximal Assistance, Total=Total Assistance, NT=Not Tested    GAIT: I Mod I S SBA CGA Min Mod Max Total  NT x2 Comments:   Level of Assistance [] [] [] [] [x] [x] [] [] [] [] []    Distance 80 feet    DME HHA    Gait Quality Decreased dilip , Decreased step clearance, Decreased step length, and Path deviations     Weightbearing Status Restrictions/Precautions  Restrictions/Precautions: Fall Risk    Stairs      I=Independent, Mod I=Modified Independent, S=Supervision, SBA=Standby Assistance, CGA=Contact Guard Assistance,   Min=Minimal Assistance, Mod=Moderate Assistance, Max=Maximal Assistance, Total=Total Assistance, NT=Not Tested    PLAN:   FREQUENCY AND DURATION: Daily for duration of hospital stay or until stated goals are met, whichever comes first.    THERAPY PROGNOSIS: Good    PROBLEM LIST:   (Skilled intervention is medically necessary to address:)  Decreased ADL/Functional Activities  Decreased Activity Tolerance  Decreased Balance  Decreased Gait Ability  Decreased Safety Awareness  Decreased Strength  Decreased Transfer Abilities INTERVENTIONS PLANNED:   (Benefits and precautions of physical therapy have been discussed with the patient.)  Therapeutic Activity  Therapeutic Exercise/HEP  Gait Training  Manual Therapy  Education       TREATMENT:   EVALUATION: LOW COMPLEXITY: (Untimed Charge)    TREATMENT:   Therapeutic Activity (23 Minutes): Therapeutic activity included Rolling, Supine to Sit, Sit to Supine, Scooting, Transfer Training, Ambulation on level ground, Sitting balance , and Standing balance to improve functional Activity tolerance, Balance, Coordination, Mobility, and Strength.     TREATMENT GRID:  N/A    AFTER TREATMENT PRECAUTIONS: Bed, Bed/Chair Locked, Call light within reach, Needs within reach, RN notified, and Side rails x2    INTERDISCIPLINARY COLLABORATION:  RN/ PCT, PT/ PTA, and OT/ [place]    EDUCATION:      TIME IN/OUT:  Time In: 1400  Time Out: 1430  Minutes: [address], PT

## 2023-03-05 ENCOUNTER — APPOINTMENT (OUTPATIENT)
Dept: GENERAL RADIOLOGY | Age: 83
DRG: 871 | End: 2023-03-05
Payer: MEDICARE

## 2023-03-05 PROBLEM — J96.01 ACUTE HYPOXEMIC RESPIRATORY FAILURE (HCC): Status: ACTIVE | Noted: 2023-03-05

## 2023-03-05 LAB
ALBUMIN SERPL-MCNC: 1.9 G/DL (ref 3.2–4.6)
ALBUMIN/GLOB SERPL: 0.6 (ref 0.4–1.6)
ALP SERPL-CCNC: 101 U/L (ref 50–136)
ALT SERPL-CCNC: 25 U/L (ref 12–65)
ANION GAP SERPL CALC-SCNC: 5 MMOL/L (ref 2–11)
ANION GAP SERPL CALC-SCNC: 7 MMOL/L (ref 2–11)
AST SERPL-CCNC: 28 U/L (ref 15–37)
BACTERIA SPEC CULT: NORMAL
BASOPHILS # BLD: 0 K/UL (ref 0–0.2)
BASOPHILS NFR BLD: 0 % (ref 0–2)
BILIRUB SERPL-MCNC: 0.4 MG/DL (ref 0.2–1.1)
BUN SERPL-MCNC: 7 MG/DL (ref 8–23)
BUN SERPL-MCNC: 8 MG/DL (ref 8–23)
CALCIUM SERPL-MCNC: 8.1 MG/DL (ref 8.3–10.4)
CALCIUM SERPL-MCNC: 8.8 MG/DL (ref 8.3–10.4)
CHLORIDE SERPL-SCNC: 105 MMOL/L (ref 101–110)
CHLORIDE SERPL-SCNC: 110 MMOL/L (ref 101–110)
CO2 SERPL-SCNC: 24 MMOL/L (ref 21–32)
CO2 SERPL-SCNC: 25 MMOL/L (ref 21–32)
CREAT SERPL-MCNC: 0.48 MG/DL (ref 0.6–1)
CREAT SERPL-MCNC: 0.73 MG/DL (ref 0.6–1)
DIFFERENTIAL METHOD BLD: ABNORMAL
EOSINOPHIL # BLD: 0.1 K/UL (ref 0–0.8)
EOSINOPHIL NFR BLD: 1 % (ref 0.5–7.8)
ERYTHROCYTE [DISTWIDTH] IN BLOOD BY AUTOMATED COUNT: 13.1 % (ref 11.9–14.6)
GLOBULIN SER CALC-MCNC: 3.1 G/DL (ref 2.8–4.5)
GLUCOSE SERPL-MCNC: 108 MG/DL (ref 65–100)
GLUCOSE SERPL-MCNC: 96 MG/DL (ref 65–100)
HCT VFR BLD AUTO: 29.1 % (ref 35.8–46.3)
HGB BLD-MCNC: 10.1 G/DL (ref 11.7–15.4)
IMM GRANULOCYTES # BLD AUTO: 0.1 K/UL (ref 0–0.5)
IMM GRANULOCYTES NFR BLD AUTO: 1 % (ref 0–5)
LYMPHOCYTES # BLD: 1.1 K/UL (ref 0.5–4.6)
LYMPHOCYTES NFR BLD: 11 % (ref 13–44)
MAGNESIUM SERPL-MCNC: 2 MG/DL (ref 1.8–2.4)
MAGNESIUM SERPL-MCNC: 2 MG/DL (ref 1.8–2.4)
MCH RBC QN AUTO: 32.7 PG (ref 26.1–32.9)
MCHC RBC AUTO-ENTMCNC: 34.7 G/DL (ref 31.4–35)
MCV RBC AUTO: 94.2 FL (ref 82–102)
MONOCYTES # BLD: 0.9 K/UL (ref 0.1–1.3)
MONOCYTES NFR BLD: 9 % (ref 4–12)
NEUTS SEG # BLD: 7.8 K/UL (ref 1.7–8.2)
NEUTS SEG NFR BLD: 78 % (ref 43–78)
NRBC # BLD: 0 K/UL (ref 0–0.2)
NT PRO BNP: 6482 PG/ML
PLATELET # BLD AUTO: 195 K/UL (ref 150–450)
PMV BLD AUTO: 11.5 FL (ref 9.4–12.3)
POTASSIUM SERPL-SCNC: 2.8 MMOL/L (ref 3.5–5.1)
POTASSIUM SERPL-SCNC: 2.9 MMOL/L (ref 3.5–5.1)
PROCALCITONIN SERPL-MCNC: 1.24 NG/ML (ref 0–0.49)
PROT SERPL-MCNC: 5 G/DL (ref 6.3–8.2)
RBC # BLD AUTO: 3.09 M/UL (ref 4.05–5.2)
SERVICE CMNT-IMP: NORMAL
SODIUM SERPL-SCNC: 137 MMOL/L (ref 133–143)
SODIUM SERPL-SCNC: 139 MMOL/L (ref 133–143)
WBC # BLD AUTO: 10 K/UL (ref 4.3–11.1)

## 2023-03-05 PROCEDURE — 36415 COLL VENOUS BLD VENIPUNCTURE: CPT

## 2023-03-05 PROCEDURE — 83880 ASSAY OF NATRIURETIC PEPTIDE: CPT

## 2023-03-05 PROCEDURE — 84145 PROCALCITONIN (PCT): CPT

## 2023-03-05 PROCEDURE — 94761 N-INVAS EAR/PLS OXIMETRY MLT: CPT

## 2023-03-05 PROCEDURE — 94640 AIRWAY INHALATION TREATMENT: CPT

## 2023-03-05 PROCEDURE — A4216 STERILE WATER/SALINE, 10 ML: HCPCS | Performed by: INTERNAL MEDICINE

## 2023-03-05 PROCEDURE — 99223 1ST HOSP IP/OBS HIGH 75: CPT | Performed by: INTERNAL MEDICINE

## 2023-03-05 PROCEDURE — 97530 THERAPEUTIC ACTIVITIES: CPT

## 2023-03-05 PROCEDURE — 80053 COMPREHEN METABOLIC PANEL: CPT

## 2023-03-05 PROCEDURE — 6360000002 HC RX W HCPCS: Performed by: FAMILY MEDICINE

## 2023-03-05 PROCEDURE — 83735 ASSAY OF MAGNESIUM: CPT

## 2023-03-05 PROCEDURE — 6370000000 HC RX 637 (ALT 250 FOR IP): Performed by: INTERNAL MEDICINE

## 2023-03-05 PROCEDURE — 71045 X-RAY EXAM CHEST 1 VIEW: CPT

## 2023-03-05 PROCEDURE — 85025 COMPLETE CBC W/AUTO DIFF WBC: CPT

## 2023-03-05 PROCEDURE — 2580000003 HC RX 258: Performed by: INTERNAL MEDICINE

## 2023-03-05 PROCEDURE — 2500000003 HC RX 250 WO HCPCS: Performed by: INTERNAL MEDICINE

## 2023-03-05 PROCEDURE — 1100000000 HC RM PRIVATE

## 2023-03-05 PROCEDURE — 2700000000 HC OXYGEN THERAPY PER DAY

## 2023-03-05 PROCEDURE — 6360000002 HC RX W HCPCS: Performed by: INTERNAL MEDICINE

## 2023-03-05 RX ORDER — BUDESONIDE 0.5 MG/2ML
0.5 INHALANT ORAL 2 TIMES DAILY
Status: DISCONTINUED | OUTPATIENT
Start: 2023-03-05 | End: 2023-03-09 | Stop reason: HOSPADM

## 2023-03-05 RX ORDER — POTASSIUM CHLORIDE 20 MEQ/1
20 TABLET, EXTENDED RELEASE ORAL 2 TIMES DAILY WITH MEALS
Status: DISCONTINUED | OUTPATIENT
Start: 2023-03-05 | End: 2023-03-05

## 2023-03-05 RX ORDER — POTASSIUM CHLORIDE 20 MEQ/1
20 TABLET, EXTENDED RELEASE ORAL
Status: DISPENSED | OUTPATIENT
Start: 2023-03-05 | End: 2023-03-06

## 2023-03-05 RX ORDER — POTASSIUM CHLORIDE 20 MEQ/1
20 TABLET, EXTENDED RELEASE ORAL
Status: DISCONTINUED | OUTPATIENT
Start: 2023-03-05 | End: 2023-03-05

## 2023-03-05 RX ORDER — FUROSEMIDE 10 MG/ML
20 INJECTION INTRAMUSCULAR; INTRAVENOUS 2 TIMES DAILY
Status: DISCONTINUED | OUTPATIENT
Start: 2023-03-05 | End: 2023-03-07

## 2023-03-05 RX ORDER — FUROSEMIDE 10 MG/ML
40 INJECTION INTRAMUSCULAR; INTRAVENOUS ONCE
Status: COMPLETED | OUTPATIENT
Start: 2023-03-05 | End: 2023-03-05

## 2023-03-05 RX ORDER — FUROSEMIDE 10 MG/ML
20 INJECTION INTRAMUSCULAR; INTRAVENOUS ONCE
Status: COMPLETED | OUTPATIENT
Start: 2023-03-05 | End: 2023-03-05

## 2023-03-05 RX ADMIN — VERAPAMIL HYDROCHLORIDE 180 MG: 180 TABLET, FILM COATED, EXTENDED RELEASE ORAL at 20:02

## 2023-03-05 RX ADMIN — FUROSEMIDE 40 MG: 10 INJECTION, SOLUTION INTRAMUSCULAR; INTRAVENOUS at 10:54

## 2023-03-05 RX ADMIN — POTASSIUM CHLORIDE 20 MEQ: 1500 TABLET, EXTENDED RELEASE ORAL at 20:02

## 2023-03-05 RX ADMIN — DONEPEZIL HYDROCHLORIDE 10 MG: 5 TABLET ORAL at 20:02

## 2023-03-05 RX ADMIN — ATORVASTATIN CALCIUM 80 MG: 40 TABLET, FILM COATED ORAL at 20:02

## 2023-03-05 RX ADMIN — RIVAROXABAN 20 MG: 20 TABLET, FILM COATED ORAL at 08:49

## 2023-03-05 RX ADMIN — AZITHROMYCIN MONOHYDRATE 500 MG: 500 INJECTION, POWDER, LYOPHILIZED, FOR SOLUTION INTRAVENOUS at 20:02

## 2023-03-05 RX ADMIN — SODIUM CHLORIDE, PRESERVATIVE FREE 10 ML: 5 INJECTION INTRAVENOUS at 08:41

## 2023-03-05 RX ADMIN — POTASSIUM CHLORIDE 20 MEQ: 1500 TABLET, EXTENDED RELEASE ORAL at 10:54

## 2023-03-05 RX ADMIN — ALBUTEROL SULFATE 2.5 MG: 2.5 SOLUTION RESPIRATORY (INHALATION) at 07:35

## 2023-03-05 RX ADMIN — BUDESONIDE 500 MCG: 0.5 INHALANT RESPIRATORY (INHALATION) at 22:43

## 2023-03-05 RX ADMIN — POTASSIUM CHLORIDE 20 MEQ: 1500 TABLET, EXTENDED RELEASE ORAL at 23:26

## 2023-03-05 RX ADMIN — CYANOCOBALAMIN TAB 1000 MCG 1000 MCG: 1000 TAB at 08:49

## 2023-03-05 RX ADMIN — FUROSEMIDE 20 MG: 10 INJECTION, SOLUTION INTRAMUSCULAR; INTRAVENOUS at 02:39

## 2023-03-05 RX ADMIN — QUETIAPINE FUMARATE 25 MG: 25 TABLET ORAL at 20:02

## 2023-03-05 RX ADMIN — CEFTRIAXONE 1000 MG: 1 INJECTION, POWDER, FOR SOLUTION INTRAMUSCULAR; INTRAVENOUS at 17:42

## 2023-03-05 RX ADMIN — GUAIFENESIN 600 MG: 600 TABLET, EXTENDED RELEASE ORAL at 20:02

## 2023-03-05 RX ADMIN — POTASSIUM CHLORIDE 20 MEQ: 1500 TABLET, EXTENDED RELEASE ORAL at 17:40

## 2023-03-05 RX ADMIN — GUAIFENESIN 600 MG: 600 TABLET, EXTENDED RELEASE ORAL at 08:49

## 2023-03-05 RX ADMIN — POTASSIUM CHLORIDE 20 MEQ: 1500 TABLET, EXTENDED RELEASE ORAL at 13:49

## 2023-03-05 RX ADMIN — BUDESONIDE 500 MCG: 0.5 INHALANT RESPIRATORY (INHALATION) at 07:34

## 2023-03-05 RX ADMIN — SODIUM CHLORIDE, PRESERVATIVE FREE 10 ML: 5 INJECTION INTRAVENOUS at 20:03

## 2023-03-05 RX ADMIN — POTASSIUM CHLORIDE 20 MEQ: 1500 TABLET, EXTENDED RELEASE ORAL at 22:05

## 2023-03-05 RX ADMIN — POTASSIUM CHLORIDE 20 MEQ: 1500 TABLET, EXTENDED RELEASE ORAL at 15:28

## 2023-03-05 RX ADMIN — FAMOTIDINE 20 MG: 10 INJECTION, SOLUTION INTRAVENOUS at 08:49

## 2023-03-05 RX ADMIN — FUROSEMIDE 20 MG: 10 INJECTION, SOLUTION INTRAMUSCULAR; INTRAVENOUS at 17:41

## 2023-03-05 NOTE — CONSULTS
PULMONARY/CRITICAL CARE CONSULT NOTE           3/5/2023    Heidi Granados                        Date of Admission:  3/2/2023    The patient's chart is reviewed and the patient is discussed with the staff. Subjective:     Patient is a 80 y.o.  female seen and evaluated at the request of Dr. Halina Holcomb. This is a pleasant lady who is well-known to me specifically, since that note her daughter and I have met her in the past.    Please note the patient does have fairly advanced dementia, high blood pressure, atrial fibrillation and came in this time around because of a pneumonia on the left side. Was having fever, white count up to 18,000. Since she has been here, has been on antibiotics and not requiring oxygen. Chest x-ray done today shows a moderate size pleural effusion versus worsening pneumonia. Oxygen needs been up to 12 L/min and are still elevated at this time. Patient fortunately did take her Xarelto this morning. She does have a cough but is not reporting significant shortness of breath. Looks a BMP is elevated at about 6.5 K and and currently being diuresed with Lasix. Does have electrolyte abnormalities and being adjusted and replaced at this time.     Given worsening oxygen needs and pleural effusion asked to see if we can evaluate patient    Review of Systems: Comprehensive ROS negative except in HPI    Current Outpatient Medications   Medication Instructions    atorvastatin (LIPITOR) 40 MG tablet TAKE 1 TABLET EVERY DAY    cyanocobalamin 1,000 mcg, Oral, DAILY    donepezil (ARICEPT) 10 mg, Oral, NIGHTLY    fluticasone (FLONASE) 50 MCG/ACT nasal spray 1 spray, Each Nostril, DAILY    Levocetirizine Dihydrochloride (XYZAL ALLERGY 24HR PO) Oral, DAILY    lisinopril (PRINIVIL;ZESTRIL) 20 MG tablet TAKE 1 TABLET EVERY DAY    Multiple Vitamins-Minerals (MULTIVITAMIN ADULTS PO) Oral, DAILY    QUEtiapine (SEROQUEL) 25 mg, Oral, Nightly    rivaroxaban (XARELTO) 20 mg, Oral, DAILY WITH BREAKFAST    verapamil (VERELAN) 240 mg, Oral, NIGHTLY, 1 every day      Past Medical History:   Diagnosis Date    Atrial fibrillation ([address]     Dementia ([address]     Depression     Hypercholesterolemia     Hypertension     IBS (irritable bowel syndrome)      Past Surgical History:   Procedure Laterality Date    BREAST BIOPSY Left 2014    BREAST SURGERY      bilat implant    BREAST SURGERY  2013    lumpectomy    COLONOSCOPY  06/2018     no polyps    COLONOSCOPY  2012    in [place] (repeat in 10 years)    HERNIA REPAIR  2012    inguinal    IMPLANT BREAST SILICONE/EQ Bilateral     35 yrs ago    ORTHOPEDIC SURGERY Left     bunion    OTHER SURGICAL HISTORY      face lift    UPPER GASTROINTESTINAL ENDOSCOPY  06/25/2018     Social History     Socioeconomic History    Marital status:      Spouse name: Not on file    Number of children: Not on file    Years of education: Not on file    Highest education level: Not on file   Occupational History    Not on file   Tobacco Use    Smoking status: Never    Smokeless tobacco: Never   Vaping Use    Vaping Use: Never used   Substance and Sexual Activity    Alcohol use: Never    Drug use: Never    Sexual activity: Not Currently   Other Topics Concern    Not on file   Social History Narrative    Not on file     Social Determinants of Health     Financial Resource Strain: Not on file   Food Insecurity: Not on file   Transportation Needs: Not on file   Physical Activity: Sufficiently Active    Days of Exercise per Week: 5 days    Minutes of Exercise per Session: 40 min   Stress: Not on file   Social Connections: Not on file   Intimate Partner Violence: Not on file   Housing Stability: Not on file     Family History   Problem Relation Age of Onset    Dementia Sister     Hypertension Mother     Cancer Sister 78        pancreatic    Hypertension Father      No Known Allergies  Objective:   Blood pressure 117/77, pulse 88, temperature 97.7 °F (36.5 °C), temperature source Oral, resp. rate 18, height 4' 10\" (1.473 m), weight 98 lb (44.5 kg), SpO2 96 %. Intake/Output Summary (Last 24 hours) at 3/5/2023 1109  Last data filed at 3/5/2023 0845  Gross per 24 hour   Intake 180 ml   Output 1000 ml   Net -820 ml     PHYSICAL EXAM   Constitutional:  the patient is well developed and in no acute distress  EENMT:  Sclera clear, pupils equal, oral mucosa moist  Respiratory: symmetric chest rise. Decreased sounds left chest three quarters chest with positive dullness to percussion and equal phoning. Positive equal phoning  Cardiovascular:  RRR without M,G,R. There is no lower extremity edema. Gastrointestinal: soft and non-tender; with positive bowel sounds. Musculoskeletal: warm without cyanosis. Normal muscle tone. Skin:  no jaundice or rashes, no wounds   Neurologic: symmetric strength, fluent speech  Psychiatric:  calm, appropriate, oriented x 4    Imaging: I performed an independent interpretation of the patient's images. CXR:  larger left sided pneumonia +-pleural effusion. Recent Labs     03/02/23  1623 03/02/23  1625 03/03/23  0619 03/04/23  0728 03/05/23  0609   WBC 18.3*  --  13.3* 14.0* 10.0   HGB 13.5  --  11.2* 11.2* 10.1*   HCT 39.2  --  33.5* 33.5* 29.1*     --  175 195 195   INR 1.3 1.5*  --   --   --      --  138 140 139   K 3.8  --  3.6 3.3* 2.9*     --  112* 114* 110   CO2 26  --  22 21 24   BUN 18  --  15 10 8   CREATININE 0.92  --  0.53* 0.52* 0.48*   MG  --   --   --  2.1 2.0   BILITOT 1.2*  --  0.9 0.7 0.4   AST 19  --  23 26 28   ALT 27  --  23 22 25   ALKPHOS 104  --  81 113 101   TROPHS 18.5*  --   --   --   --    NTPROBNP  --   --   --   --  6,482*     ECHO: No results found for this or any previous visit.     MICRO:   Recent Labs     03/02/23  1731 03/02/23  1753 03/02/23  1811   CULTURE NO GROWTH 2 DAYS NO GROWTH 3 DAYS NO GROWTH 3 DAYS     Assessment and Plan:  (Medical Decision Making)   Is a pleasant 75-year-old female with hypertension, atrial fibrillation, severe dementia with left-sided pneumonia with worsening oxygen and likely pleural effusion. Patient unfortunate to take Xarelto today as a blood thinner. Oxygen needs up, but patient is awake and alert. Principal Problem:    Sepsis (HCC)from Left lower lobe pneumonia  --on antibiotics. Cultures negative. No fevers now       Acute hypoxemic respiratory failure (HCC)   --Progressive hypoxemia and likely developed pleural effusion    Left-sided pleural effusion  - Noted on chest x-ray today seems large. Does have elevated BNP and agree with echocardiogram check EF. Paroxysmal atrial fibrillation  - Heart rate controlled, but on blood thinners. Will need to stop, since planning to possible thoracentesis within the next 48 hours. Will repeat x-ray in 48 prior to tap. Acute congestive heart failure  - Likely systolic heart failure, will need echocardiogram in order to assess. Last echocardiogram in the computer is from 2019, and noted EF of 60 to 65% with moderate MR. We will see what the new one looks like  - Agree with Lasix at this time  --check BNP tomorrow. Dementia with behavioral disturbance  --Has been doing well here but advanced    Hypokalemia and hypomagnesemia  --replaced k+ and check mag now and replace as needed. Dysphagia  --Agree with modified barium swallow    Continue remaining treatment per PMD      DNR    More than 50% of the time documented was spent in face-to-face contact with the patient and in the care of the patient on the floor/unit where the patient is located. Thank you very much for this referral.  We appreciate the opportunity to participate in this patient's care. Will follow along with above stated plan.     Veronica Anderson MD

## 2023-03-05 NOTE — PROGRESS NOTES
Hospitalist Progress Note   Admit Date:  3/2/2023  4:09 PM   Name:  Gavin Abernathy   Age:  80 y.o. Sex:  female  :  1940   MRN:  [id number]   Room:  [facility]    Presenting Complaint: Facial Droop     Reason(s) for Admission: Confusion [R41.0]  Generalized weakness [R53.1]  Sepsis ([facility] [address] [A41.9]  Pneumonia of left lower lobe due to infectious organism [J18.9]  Cerebrovascular accident (CVA), unspecified mechanism ([facility] [address] [I63.9]  Dementia without behavioral disturbance, psychotic disturbance, mood disturbance, or anxiety, unspecified dementia severity, unspecified dementia type [facility]) [F03.90]     Hospital Course:     From history and physical HPI:  Gavin Abernathy is a 80 y.o. female with medical history of paroxysmal atrial fibrillation on Xarelto and verapamil rate control, hypertension and dementia with behavioral abnormalities on Seroquel nocturnally. She has been visiting her  in the hospital with a hernia recently and has had fatigue and is staying with family. Family reports that patient was difficult to arouse this morning weak and could not stand. Noticed increased temp complained of left shoulder plain and family felt possible left-sided weakness and possible left facial droop. Droop not felt to be significant by ER or neurology and initial work-up for code stroke negative. Patient found to have sepsis with fever leukocytosis and a new left lower lobe pneumonia. Neurology feels symptoms likely related to her baseline dementia with acute infectious process. Patient after receiving IV fluids somewhat more alert conversant but alert to person only. Pleasant and cooperative and maintains social graces. Discussed with daughter and son at bedside and patient to be admitted and will honor DO NOT RESUSCITATE status. Patient has had outpatient palliative care consultations.   Patient does have behavioral abnormality with her dementia with nocturnal confusion and will continue her nocturnal Seroquel but family expects she could have aggravation of behavioral abnormalities with change in environment during hospitalization. Discussed planned antibiotics supportive care and continued home meds including anticoagulation and rate control regarding atrial fibrillation. No dysuria. Serum creatinine okay. Lactic acid pending. A-fib apparently paroxysmal and EKG was sinus mechanism noted. Subjective & 24hr Events (03/05/23): Patient with acute hypoxemic respiratory failure last evening-admitted with pneumonia with notation of left pleural effusion with stat x-ray early morning hours late last evening revealing large increased effusion left side. Oxygenation requirements significantly increased and required high flow nasal cannula-was on 12 L at 1 point now decreased to 8 L nasal cannula O2 sat okay. Patient poorly responsive difficult to arouse. Hypokalemia noted with serum potassium 2.9. It is not clear that standing order for potassium replacement was utilized and additional dosing ordered oral today. Will need fixed dose Lasix and potassium replacement. Magnesium level was 2. CBC-White blood count improved procalcitonin level improving and clinically pneumonia improving but suspect left pleural effusion related to heart failure patient with atrial fibrillation-echocardiogram will be ordered with brain natruretic peptide and fixed dose Lasix twice daily intravenously for now. Daughter requested pulmonary consult. Physical therapy evaluation was completed earlier and home health recommended but patient status has deteriorated and she does not at present time appear capable of walking and at present time appears to be 100% dependent for ADLs. May need to reevaluate for discharge disposition. Assessment & Plan:      Principal Problem:    Sepsis ([facility] [address]  Plan: Secondary to pneumonia. We will utilize sepsis protocol.   Risk-benefit of aggressive IV hydration given patient age and status and BP stable. Creatinine normal.  Ceftriaxone and azithromycin. Blood cultures. Further intervention pending initial response and clinical stability  3/3--continue same care-continue current rate IV fluids at least another 24 hours and then consider attenuate given age if BP stable   3/4--clinically improving-continue same care-May attenuate IV fluids  3/5--procalcitonin level and CBC improved. Continue current antibiotics. See pneumonia below-large left pleural effusion    Acute hypoxemic respiratory failure  3/5--diuresed-low serum albumin likely contributing to pulmonary edema and need LVEF assessment. Check BNP and echocardiogram.  Fixed dose Lasix and monitor electrolytes closely-significant hypokalemia being replaced. Wean oxygen as able with diuresis. Initial low-dose Lasix appears to have helped alveolar-arterial gradient. Hypoalbuminemia  3/5--- encourage oral intake. Given large pleural effusion/pulmonary edema may need IV albumin supplement    Large left pleural effusion:  3/5--follow-up chest x-ray following diuresis. Family request pulmonology consult evaluate for possible thoracentesis if not diuresed away significantly responsive to diuresis. Cannot exclude parapneumonic effusion    Hypokalemia-  3/5-oral supplement-monitor magnesium and potassium-we will need to increase fixed dose supplement with fixed dose Lasix during attempts at diuresis. I suspect this is significantly contributing to her current lethargy    Debility-  3/5-significant clinical deterioration since admission. May need PT/OT to reevaluate for proper discharge disposition. Active Problems:    Left lower lobe pneumonia  Plan: Above antibiotics, mucolytic, as needed bronchodilators. 3/3No significant productive cough-consider sputum evaluation but has received multiple doses of antibiotics.-Continue ceftriaxone and azithromycin full course.   Continue pulmonary toilet, supportive care. 3/4--blood cultures no growth to date-continue supportive care and current antibiotics. 3/5--continue current antibiotics-respiratory status aggravated by large left pleural effusion-see above cannot exclude parapneumonic        Dementia with behavioral disturbance  Plan: Continue home meds including Aricept and Seroquel. Monitor for behavioral disturbance. 3/3--continue home medications-did well last evening-monitor  3/4--some confusion last evening per daughter  3/5--slept poorly last evening. Essential hypertension  3/3--- lisinopril held-slowly restart as clinically improves-monitor  3/4--continue to hold ACE inhibitor-tolerated lower dose long-acting verapamil. Resume home meds as BP improves with clinical improvement-monitor for now. Paroxysmal atrial fibrillation (HCC)  Plan: Continue home verapamil and anticoagulation. Xarelto. 3/3--- verapamil held last evening. Try restart tonight at lower dose 180 mg to replace 240 mg dosage and observe. She has paroxysmal A-fib-hold if BP remains low. Continue IV hydration. 3/4--- continue same. 3/5--continue lower dose verapamil-restart ACE inhibitor as BP allows        PT/OT evals and PPD needed/ordered? Yes  Diet: Diet NPO Exceptions are: Sips of Water with Meds, Sips of Clear Liquids  VTE prophylaxis: Already on anticoagulation  Code status: DNR               Hospital Problems:  Principal Problem:    Sepsis ([facility] [address]  Active Problems:    Left lower lobe pneumonia    Dementia with behavioral disturbance    Dysphagia    Essential hypertension    Paroxysmal atrial fibrillation (HCC)  Resolved Problems:    * No resolved hospital problems.  *      Objective:   Patient Vitals for the past 24 hrs:   Temp Pulse Resp BP SpO2   03/05/23 0746 -- 88 18 -- 96 %   03/05/23 0738 -- 88 18 -- 98 %   03/05/23 0346 97.7 °F (36.5 °C) 82 18 117/77 97 %   03/05/23 0233 98.2 °F (36.8 °C) 72 -- (!) 94/55 94 %   03/04/23 2340 97.9 °F (36.6 °C) 80 18 98/71 91 %   03/04/23 2138 -- 89 18 -- 92 %   03/04/23 1929 99 °F (37.2 °C) (!) 130 18 (!) 145/94 96 %   03/04/23 1612 99.3 °F (37.4 °C) 88 24 135/75 92 %   03/04/23 1611 -- -- -- -- 91 %   03/04/23 1606 -- 79 -- -- (!) 82 %   03/04/23 1126 98.2 °F (36.8 °C) 73 18 128/72 99 %         Oxygen Therapy  SpO2: 96 %  Pulse Oximeter Device Mode: Intermittent  Pulse Oximeter Device Location: Right, Finger  O2 Device: High flow nasal cannula  O2 Flow Rate (L/min): 5 L/min    Estimated body mass index is 20.48 kg/m² as calculated from the following:    Height as of this encounter: 4' 10\" (1.473 m).    Weight as of this encounter: 98 lb (44.5 kg).    Intake/Output Summary (Last 24 hours) at 3/5/2023 0919  Last data filed at 3/5/2023 0623  Gross per 24 hour   Intake --   Output 1000 ml   Net -1000 ml           Physical Exam:   Blood pressure 117/77, pulse 88, temperature 97.7 °F (36.5 °C), temperature source Oral, resp. rate 18, height 4' 10\" (1.473 m), weight 98 lb (44.5 kg), SpO2 96 %.    Physical Exam:   General:                  Significant increased lethargy today-difficult to arouse.  Slept poorly last evening given respiratory status and behavioral abnormalities.  Also electrolyte abnormalities suspect responsible.  Patient able to verbalize  Eyes:                                           Conjunctivae/corneas clear. Pupils equally round and reactive to light, extraocular movements intact.   Lungs:                 Decreased breath sounds bilateral.  No gross wheezing.  Heart:                    Irregular, rate controlled.  No gross gallop.  Suspect HJR  Abdomen:                       Soft, non-tender. Bowel sounds normal. No masses,  No organomegaly.  Extremities:                     Extremities normal, atraumatic, no cyanosis or unilateral lower extremity edema  Neurologic:                     CNII-XII intact.  Increased weakness since admission.  No focal deficits.        Lab/Data Review:  All lab results for the last 24  hours reviewed.         I have personally reviewed labs and tests:  Recent Labs:  Recent Results (from the past 48 hour(s))   CBC with Auto Differential    Collection Time: 03/04/23  7:28 AM   Result Value Ref Range    WBC 14.0 (H) 4.3 - 11.1 K/uL    RBC 3.50 (L) 4.05 - 5.2 M/uL    Hemoglobin 11.2 (L) 11.7 - 15.4 g/dL    Hematocrit 33.5 (L) 35.8 - 46.3 %    MCV 95.7 82.0 - 102.0 FL    MCH 32.0 26.1 - 32.9 PG    MCHC 33.4 31.4 - 35.0 g/dL    RDW 13.3 11.9 - 14.6 %    Platelets 359 018 - 208 K/uL    MPV 11.6 9.4 - 12.3 FL    nRBC 0.00 0.0 - 0.2 K/uL    Differential Type AUTOMATED      Seg Neutrophils 82 (H) 43 - 78 %    Lymphocytes 9 (L) 13 - 44 %    Monocytes 8 4.0 - 12.0 %    Eosinophils % 1 0.5 - 7.8 %    Basophils 0 0.0 - 2.0 %    Immature Granulocytes 1 0.0 - 5.0 %    Segs Absolute 11.4 (H) 1.7 - 8.2 K/UL    Absolute Lymph # 1.3 0.5 - 4.6 K/UL    Absolute Mono # 1.1 0.1 - 1.3 K/UL    Absolute Eos # 0.1 0.0 - 0.8 K/UL    Basophils Absolute 0.0 0.0 - 0.2 K/UL    Absolute Immature Granulocyte 0.1 0.0 - 0.5 K/UL   Comprehensive Metabolic Panel w/ Reflex to MG    Collection Time: 03/04/23  7:28 AM   Result Value Ref Range    Sodium 140 133 - 143 mmol/L    Potassium 3.3 (L) 3.5 - 5.1 mmol/L    Chloride 114 (H) 101 - 110 mmol/L    CO2 21 21 - 32 mmol/L    Anion Gap 5 2 - 11 mmol/L    Glucose 110 (H) 65 - 100 mg/dL    BUN 10 8 - 23 MG/DL    Creatinine 0.52 (L) 0.6 - 1.0 MG/DL    Est, Glom Filt Rate >60 >60 ml/min/1.73m2    Calcium 8.4 8.3 - 10.4 MG/DL    Total Bilirubin 0.7 0.2 - 1.1 MG/DL    ALT 22 12 - 65 U/L    AST 26 15 - 37 U/L    Alk Phosphatase 113 50 - 130 U/L    Total Protein 5.7 (L) 6.3 - 8.2 g/dL    Albumin 2.3 (L) 3.2 - 4.6 g/dL    Globulin 3.4 2.8 - 4.5 g/dL    Albumin/Globulin Ratio 0.7 0.4 - 1.6     Magnesium    Collection Time: 03/04/23  7:28 AM   Result Value Ref Range    Magnesium 2.1 1.8 - 2.4 mg/dL   CBC with Auto Differential    Collection Time: 03/05/23  6:09 AM   Result Value Ref Range    WBC 10.0 4.3 - 11.1 K/uL    RBC 3.09 (L) 4.05 - 5.2 M/uL    Hemoglobin 10.1 (L) 11.7 - 15.4 g/dL    Hematocrit 29.1 (L) 35.8 - 46.3 %    MCV 94.2 82.0 - 102.0 FL    MCH 32.7 26.1 - 32.9 PG    MCHC 34.7 31.4 - 35.0 g/dL    RDW 13.1 11.9 - 14.6 %    Platelets 297 359 - 306 K/uL    MPV 11.5 9.4 - 12.3 FL    nRBC 0.00 0.0 - 0.2 K/uL    Differential Type AUTOMATED      Seg Neutrophils 78 43 - 78 %    Lymphocytes 11 (L) 13 - 44 %    Monocytes 9 4.0 - 12.0 %    Eosinophils % 1 0.5 - 7.8 %    Basophils 0 0.0 - 2.0 %    Immature Granulocytes 1 0.0 - 5.0 %    Segs Absolute 7.8 1.7 - 8.2 K/UL    Absolute Lymph # 1.1 0.5 - 4.6 K/UL    Absolute Mono # 0.9 0.1 - 1.3 K/UL    Absolute Eos # 0.1 0.0 - 0.8 K/UL    Basophils Absolute 0.0 0.0 - 0.2 K/UL    Absolute Immature Granulocyte 0.1 0.0 - 0.5 K/UL   Comprehensive Metabolic Panel w/ Reflex to MG    Collection Time: 03/05/23  6:09 AM   Result Value Ref Range    Sodium 139 133 - 143 mmol/L    Potassium 2.9 (L) 3.5 - 5.1 mmol/L    Chloride 110 101 - 110 mmol/L    CO2 24 21 - 32 mmol/L    Anion Gap 5 2 - 11 mmol/L    Glucose 96 65 - 100 mg/dL    BUN 8 8 - 23 MG/DL    Creatinine 0.48 (L) 0.6 - 1.0 MG/DL    Est, Glom Filt Rate >60 >60 ml/min/1.73m2    Calcium 8.1 (L) 8.3 - 10.4 MG/DL    Total Bilirubin 0.4 0.2 - 1.1 MG/DL    ALT 25 12 - 65 U/L    AST 28 15 - 37 U/L    Alk Phosphatase 101 50 - 136 U/L    Total Protein 5.0 (L) 6.3 - 8.2 g/dL    Albumin 1.9 (L) 3.2 - 4.6 g/dL    Globulin 3.1 2.8 - 4.5 g/dL    Albumin/Globulin Ratio 0.6 0.4 - 1.6     Procalcitonin    Collection Time: 03/05/23  6:09 AM   Result Value Ref Range    Procalcitonin 1.24 (H) 0.00 - 0.49 ng/mL   Magnesium    Collection Time: 03/05/23  6:09 AM   Result Value Ref Range    Magnesium 2.0 1.8 - 2.4 mg/dL       I have personally reviewed imaging studies:  Other Studies:  XR CHEST PORTABLE   Final Result   Increase in a large left effusion. This could obscure underlying pathology such    as pneumonia.   There may be a small right midlung pneumonia, as well       Cassie Lance M.D.    3/5/2023 1:03:00 AM      XR CHEST PORTABLE   Final Result   Left lower lobe pneumonia         CT HEAD WO CONTRAST   Final Result      1. No acute cranial abnormality. 2.  Stable senescent changes, as above.          FL MODIFIED BARIUM SWALLOW W VIDEO    (Results Pending)   XR CHEST PORTABLE    (Results Pending)       Current Meds:  Current Facility-Administered Medications   Medication Dose Route Frequency    budesonide (PULMICORT) nebulizer suspension 500 mcg  0.5 mg Nebulization BID    albuterol (PROVENTIL) nebulizer solution 2.5 mg  2.5 mg Nebulization Q6H PRN    furosemide (LASIX) injection 40 mg  40 mg IntraVENous Once    potassium chloride (KLOR-CON M) extended release tablet 20 mEq  20 mEq Oral Q2H    furosemide (LASIX) injection 20 mg  20 mg IntraVENous BID    potassium chloride (KLOR-CON M) extended release tablet 20 mEq  20 mEq Oral BID WC    verapamil (CALAN SR) extended release tablet 180 mg  180 mg Oral Nightly    [Held by provider] 0.9 % sodium chloride infusion   IntraVENous Continuous    sodium chloride flush 0.9 % injection 5-40 mL  5-40 mL IntraVENous 2 times per day    sodium chloride flush 0.9 % injection 5-40 mL  5-40 mL IntraVENous PRN    0.9 % sodium chloride infusion   IntraVENous PRN    famotidine (PEPCID) 20 mg in sodium chloride (PF) 0.9 % 10 mL injection  20 mg IntraVENous Daily    aluminum & magnesium hydroxide-simethicone (MAALOX) 200-200-20 MG/5ML suspension 30 mL  30 mL Oral Q6H PRN    cefTRIAXone (ROCEPHIN) 1,000 mg in sodium chloride 0.9 % 50 mL IVPB (mini-bag)  1,000 mg IntraVENous Q24H    And    azithromycin (ZITHROMAX) 500 mg in sodium chloride 0.9 % 250 mL IVPB ([id number])  500 mg IntraVENous Q24H    vitamin B-12 (CYANOCOBALAMIN) tablet 1,000 mcg  1,000 mcg Oral Daily    donepezil (ARICEPT) tablet 10 mg  10 mg Oral Nightly    fluticasone (FLONASE) 50 MCG/ACT nasal spray 1 spray  1 spray Each Nostril Daily PRN    [Held by provider] lisinopril (PRINIVIL;ZESTRIL) tablet 20 mg  20 mg Oral Daily    QUEtiapine (SEROQUEL) tablet 25 mg  25 mg Oral Nightly    rivaroxaban (XARELTO) tablet 20 mg  20 mg Oral Daily with breakfast    acetaminophen (TYLENOL) tablet 650 mg  650 mg Oral Q4H PRN    Or    acetaminophen (TYLENOL) suppository 650 mg  650 mg Rectal Q4H PRN    ondansetron (ZOFRAN-ODT) disintegrating tablet 4 mg  4 mg Oral Q8H PRN    Or    ondansetron (ZOFRAN) injection 4 mg  4 mg IntraVENous Q6H PRN    polyethylene glycol (GLYCOLAX) packet 17 g  17 g Oral Daily PRN    bisacodyl (DULCOLAX) suppository 10 mg  10 mg Rectal Daily PRN    atorvastatin (LIPITOR) tablet 80 mg  80 mg Oral Nightly    labetalol (NORMODYNE;TRANDATE) injection 10 mg  10 mg IntraVENous Q10 Min PRN    guaiFENesin (MUCINEX) extended release tablet 600 mg  600 mg Oral BID       Signed:  Candice Lau DO    Part of this note may have been written by using a voice dictation software. The note has been proof read but may still contain some grammatical/other typographical errors.

## 2023-03-05 NOTE — PROGRESS NOTES
ACUTE PHYSICAL THERAPY GOALS:   (Developed with and agreed upon by patient and/or caregiver. )    LTG:  (1.)Ms. Myrna Garcia will move from supine to sit and sit to supine  in bed with SUPERVISION within 7 treatment day(s). (2.)Ms. Myrna Garcia will transfer from bed to chair and chair to bed with SUPERVISION using the least restrictive device within 7 treatment day(s). (3.)Ms. Silva will ambulate with SUPERVISION for 250 feet with the least restrictive device within 7 treatment day(s). ________________________________________________________________________________________________     PHYSICAL THERAPY Initial Assessment and AM  (Link to Caseload Tracking: PT Visit Days : 3  Acknowledge Orders  Time In/Out  PT Charge Capture  Rehab Caseload Tracker    Liliana Esquivel is a 80 y.o. female   PRIMARY DIAGNOSIS: Sepsis ([address]  Confusion [R41.0]  Generalized weakness [R53.1]  Sepsis ([address] [A41.9]  Pneumonia of left lower lobe due to infectious organism [J18.9]  Cerebrovascular accident (CVA), unspecified mechanism ([address] [I63.9]  Dementia without behavioral disturbance, psychotic disturbance, mood disturbance, or anxiety, unspecified dementia severity, unspecified dementia type ([address] [F03.90]       Reason for Referral: Generalized Muscle Weakness (M62.81)  Other abnormalities of gait and mobility (R26.89)  Inpatient: Payor: MEDICARE / Plan: MEDICARE PART A AND B / Product Type: *No Product type* /     ASSESSMENT:     REHAB RECOMMENDATIONS:   Recommendation to date pending progress:  Setting:  Home Health Therapy    Equipment:    Rolling Walker     ASSESSMENT:  Ms. Myrna Garcia presents with somewhat limited functional mobility from her baseline. She apparently can transfer and ambulated without assist or without assistive device at baseline and has 24/7 sitters. She will benefit from PT to increase her independence with mobility and plans to return home with sitter/family support.  She was supine on contact and agreeable to therapy. 3/5: Pt with increased O2 needs today, now requiring 5 lpm NC during mobility tasks. Functionally, pt continues to transfer with CGA and require intermittent min A to ambulate 100' without AD. She requires frequent cues for redirection and safety throughout session. Also performed toilet transfer with CGA and cues for hand placement. Will continue to progress as tolerated. [facility] [address] [address] AM-PAC 6 Clicks Basic Mobility Inpatient Short Form  AM-PAC Basic Mobility - Inpatient   How much help is needed turning from your back to your side while in a flat bed without using bedrails?: A Little  How much help is needed moving from lying on your back to sitting on the side of a flat bed without using bedrails?: A Little  How much help is needed moving to and from a bed to a chair?: A Little  How much help is needed standing up from a chair using your arms?: A Little  How much help is needed walking in hospital room?: A Little  How much help is needed climbing 3-5 steps with a railing?: A Little  [facility] Inpatient Mobility Raw Score : 18  AM-PAC Inpatient T-Scale Score : 43.63  Mobility Inpatient CMS 0-100% Score: 46.58  Mobility Inpatient CMS G-Code Modifier : CK    SUBJECTIVE:   Ms. Adry Saravia states, \"I'm not feeling great today. \"     Social/Functional Lives With: Spouse  Type of Home: House  Home Layout: Two level, Able to Live on Main level with bedroom/bathroom  Home Access: Stairs to enter with rails  Entrance Stairs - Number of Steps: 3  Entrance Stairs - Rails: Both  Bathroom Shower/Tub: Walk-in shower  Bathroom Toilet: Standard  Receives Help From: Family  ADL Assistance: Needs assistance  Ambulation Assistance: Independent  Transfer Assistance: Independent    OBJECTIVE:     PAIN: Gennett Isael / O2: Kwasi Cronin / Glenny Bennett / Marcie Hidden:   Pre Treatment:   Pain Assessment: None - Denies Pain      Post Treatment: 0 Vitals        Oxygen      IV    RESTRICTIONS/PRECAUTIONS:  Restrictions/Precautions: Fall Risk                 GROSS EVALUATION: Intact Impaired (Comments):   AROM [x]     PROM []    Strength [x]     Balance []   fair   Posture [] N/A   Sensation [x]     Coordination [x]      Tone [x]     Edema []    Activity Tolerance []  Limited by fatigue    []      COGNITION/  PERCEPTION: Intact Impaired (Comments):   Orientation [x]     Vision [x]     Hearing [x]     Cognition  [x]       MOBILITY: I Mod I S SBA CGA Min Mod Max Total  NT x2 Comments:   Bed Mobility    Rolling [] [] [] [] [] [] [] [] [] [x] []    Supine to Sit [] [] [] [] [] [] [] [] [] [x] []    Scooting [] [] [] [] [] [] [] [] [] [x] []    Sit to Supine [] [] [] [] [] [] [] [] [] [x] []    Transfers    Sit to Stand [] [] [] [] [x] [] [] [] [] [] []    Bed to Chair [] [] [] [] [x] [] [] [] [] [] []    Stand to Sit [] [] [] [] [x] [] [] [] [] [] []     [] [] [] [] [] [] [] [] [] [] []    I=Independent, Mod I=Modified Independent, S=Supervision, SBA=Standby Assistance, CGA=Contact Guard Assistance,   Min=Minimal Assistance, Mod=Moderate Assistance, Max=Maximal Assistance, Total=Total Assistance, NT=Not Tested    GAIT: I Mod I S SBA CGA Min Mod Max Total  NT x2 Comments:   Level of Assistance [] [] [] [] [x] [x] [] [] [] [] [] 3 episodes of lateral LOB requiring min A to recover.    Distance 100 feet    DME HHA    Gait Quality Decreased dilip , Decreased step clearance, Decreased step length, and Path deviations     Weightbearing Status Restrictions/Precautions  Restrictions/Precautions: Fall Risk    Stairs      I=Independent, Mod I=Modified Independent, S=Supervision, SBA=Standby Assistance, CGA=Contact Guard Assistance,   Min=Minimal Assistance, Mod=Moderate Assistance, Max=Maximal Assistance, Total=Total Assistance, NT=Not Tested    PLAN:   FREQUENCY AND DURATION: Daily for duration of hospital stay or until stated goals are met, whichever comes first.    THERAPY PROGNOSIS: Good    PROBLEM LIST:   (Skilled intervention is medically necessary to address:)  Decreased ADL/Functional Activities  Decreased Activity Tolerance  Decreased Balance  Decreased Gait Ability  Decreased Safety Awareness  Decreased Strength  Decreased Transfer Abilities INTERVENTIONS PLANNED:   (Benefits and precautions of physical therapy have been discussed with the patient.)  Therapeutic Activity  Therapeutic Exercise/HEP  Gait Training  Manual Therapy  Education       TREATMENT:   EVALUATION: LOW COMPLEXITY: (Untimed Charge)    TREATMENT:   Therapeutic Activity (23 Minutes): Therapeutic activity included Rolling, Supine to Sit, Sit to Supine, Scooting, Transfer Training, Ambulation on level ground, Sitting balance , and Standing balance to improve functional Activity tolerance, Balance, Coordination, Mobility, and Strength. TREATMENT GRID:  N/A    AFTER TREATMENT PRECAUTIONS: Alarm Activated, Bed/Chair Locked, Call light within reach, Chair, Needs within reach, RN notified, and Side rails x2. Sitter in room.     INTERDISCIPLINARY COLLABORATION:  RN/ PCT, PT/ PTA, and OT/ [place]    EDUCATION:      TIME IN/OUT:  Time In: 1155  Time Out: [place]  Minutes: 25    Qian Needs, PT

## 2023-03-05 NOTE — PROGRESS NOTES
RRT Clinical Rounding Nurse Update    Vitals:    03/04/23 2138 03/04/23 2340 03/05/23 0233 03/05/23 0346   BP:  98/71 (!) 94/55 117/77   Pulse: 89 80 72 82   Resp: 18 18  18   Temp:  97.9 °F (36.6 °C) 98.2 °F (36.8 °C) 97.7 °F (36.5 °C)   TempSrc:  Oral Axillary Oral   SpO2: 92% 91% 94% 97%   Weight:       Height:            DETERIORATION INDEX SCORE: 41      ASSESSMENT:  Previous outreach assessment was reviewed. There have been no significant changes since previous assessment. Patient now on continuous pulse oximetry. 02 saturation stable. PLAN:  Will follow per RRT Clinical Rounding Program protocol.     Renetta Greene, [address]: [facility]: [phone]

## 2023-03-05 NOTE — PROGRESS NOTES
Reviewed notes for new spiritual concerns      Will continue to assess how we can best serve this family        [place].       Per notes:       [address] -  ARVIND    DNR    HAS ACP    MY CHART IS ACTIVE    HIGH RISK FALLS    LOS  3  DAYS          Good visit with pt and daughter    prayer

## 2023-03-05 NOTE — H&P (VIEW-ONLY)
PULMONARY/CRITICAL CARE CONSULT NOTE           3/5/2023    Alisha Blackman                        Date of Admission:  3/2/2023    The patient's chart is reviewed and the patient is discussed with the staff. Subjective:     Patient is a 80 y.o.  female seen and evaluated at the request of Dr. Lisha Weinstein. This is a pleasant lady who is well-known to me specifically, since that note her daughter and I have met her in the past.    Please note the patient does have fairly advanced dementia, high blood pressure, atrial fibrillation and came in this time around because of a pneumonia on the left side. Was having fever, white count up to 18,000. Since she has been here, has been on antibiotics and not requiring oxygen. Chest x-ray done today shows a moderate size pleural effusion versus worsening pneumonia. Oxygen needs been up to 12 L/min and are still elevated at this time. Patient fortunately did take her Xarelto this morning. She does have a cough but is not reporting significant shortness of breath. Looks a BMP is elevated at about 6.5 K and and currently being diuresed with Lasix. Does have electrolyte abnormalities and being adjusted and replaced at this time.     Given worsening oxygen needs and pleural effusion asked to see if we can evaluate patient    Review of Systems: Comprehensive ROS negative except in HPI    Current Outpatient Medications   Medication Instructions    atorvastatin (LIPITOR) 40 MG tablet TAKE 1 TABLET EVERY DAY    cyanocobalamin 1,000 mcg, Oral, DAILY    donepezil (ARICEPT) 10 mg, Oral, NIGHTLY    fluticasone (FLONASE) 50 MCG/ACT nasal spray 1 spray, Each Nostril, DAILY    Levocetirizine Dihydrochloride (XYZAL ALLERGY 24HR PO) Oral, DAILY    lisinopril (PRINIVIL;ZESTRIL) 20 MG tablet TAKE 1 TABLET EVERY DAY    Multiple Vitamins-Minerals (MULTIVITAMIN ADULTS PO) Oral, DAILY    QUEtiapine (SEROQUEL) 25 mg, Oral, Nightly    rivaroxaban (XARELTO) 20 mg, Oral, DAILY WITH BREAKFAST    verapamil (VERELAN) 240 mg, Oral, NIGHTLY, 1 every day      Past Medical History:   Diagnosis Date    Atrial fibrillation ([address]     Dementia ([address]     Depression     Hypercholesterolemia     Hypertension     IBS (irritable bowel syndrome)      Past Surgical History:   Procedure Laterality Date    BREAST BIOPSY Left 2014    BREAST SURGERY      bilat implant    BREAST SURGERY  2013    lumpectomy    COLONOSCOPY  06/2018     no polyps    COLONOSCOPY  2012    in [place] (repeat in 10 years)    HERNIA REPAIR  2012    inguinal    IMPLANT BREAST SILICONE/EQ Bilateral     35 yrs ago    ORTHOPEDIC SURGERY Left     bunion    OTHER SURGICAL HISTORY      face lift    UPPER GASTROINTESTINAL ENDOSCOPY  06/25/2018     Social History     Socioeconomic History    Marital status:      Spouse name: Not on file    Number of children: Not on file    Years of education: Not on file    Highest education level: Not on file   Occupational History    Not on file   Tobacco Use    Smoking status: Never    Smokeless tobacco: Never   Vaping Use    Vaping Use: Never used   Substance and Sexual Activity    Alcohol use: Never    Drug use: Never    Sexual activity: Not Currently   Other Topics Concern    Not on file   Social History Narrative    Not on file     Social Determinants of Health     Financial Resource Strain: Not on file   Food Insecurity: Not on file   Transportation Needs: Not on file   Physical Activity: Sufficiently Active    Days of Exercise per Week: 5 days    Minutes of Exercise per Session: 40 min   Stress: Not on file   Social Connections: Not on file   Intimate Partner Violence: Not on file   Housing Stability: Not on file     Family History   Problem Relation Age of Onset    Dementia Sister     Hypertension Mother     Cancer Sister 78        pancreatic    Hypertension Father      No Known Allergies  Objective:   Blood pressure 117/77, pulse 88, temperature 97.7 °F (36.5 °C), temperature source Oral, resp. rate 18, height 4' 10\" (1.473 m), weight 98 lb (44.5 kg), SpO2 96 %. Intake/Output Summary (Last 24 hours) at 3/5/2023 1109  Last data filed at 3/5/2023 0845  Gross per 24 hour   Intake 180 ml   Output 1000 ml   Net -820 ml     PHYSICAL EXAM   Constitutional:  the patient is well developed and in no acute distress  EENMT:  Sclera clear, pupils equal, oral mucosa moist  Respiratory: symmetric chest rise. Decreased sounds left chest three quarters chest with positive dullness to percussion and equal phoning. Positive equal phoning  Cardiovascular:  RRR without M,G,R. There is no lower extremity edema. Gastrointestinal: soft and non-tender; with positive bowel sounds. Musculoskeletal: warm without cyanosis. Normal muscle tone. Skin:  no jaundice or rashes, no wounds   Neurologic: symmetric strength, fluent speech  Psychiatric:  calm, appropriate, oriented x 4    Imaging: I performed an independent interpretation of the patient's images. CXR:  larger left sided pneumonia +-pleural effusion. Recent Labs     03/02/23  1623 03/02/23  1625 03/03/23  0619 03/04/23  0728 03/05/23  0609   WBC 18.3*  --  13.3* 14.0* 10.0   HGB 13.5  --  11.2* 11.2* 10.1*   HCT 39.2  --  33.5* 33.5* 29.1*     --  175 195 195   INR 1.3 1.5*  --   --   --      --  138 140 139   K 3.8  --  3.6 3.3* 2.9*     --  112* 114* 110   CO2 26  --  22 21 24   BUN 18  --  15 10 8   CREATININE 0.92  --  0.53* 0.52* 0.48*   MG  --   --   --  2.1 2.0   BILITOT 1.2*  --  0.9 0.7 0.4   AST 19  --  23 26 28   ALT 27  --  23 22 25   ALKPHOS 104  --  81 113 101   TROPHS 18.5*  --   --   --   --    NTPROBNP  --   --   --   --  6,482*     ECHO: No results found for this or any previous visit.     MICRO:   Recent Labs     03/02/23  1731 03/02/23  1753 03/02/23  1811   CULTURE NO GROWTH 2 DAYS NO GROWTH 3 DAYS NO GROWTH 3 DAYS     Assessment and Plan:  (Medical Decision Making)   Is a pleasant 70-year-old female with hypertension, atrial fibrillation, severe dementia with left-sided pneumonia with worsening oxygen and likely pleural effusion. Patient unfortunate to take Xarelto today as a blood thinner. Oxygen needs up, but patient is awake and alert. Principal Problem:    Sepsis (HCC)from Left lower lobe pneumonia  --on antibiotics. Cultures negative. No fevers now       Acute hypoxemic respiratory failure (HCC)   --Progressive hypoxemia and likely developed pleural effusion    Left-sided pleural effusion  - Noted on chest x-ray today seems large. Does have elevated BNP and agree with echocardiogram check EF. Paroxysmal atrial fibrillation  - Heart rate controlled, but on blood thinners. Will need to stop, since planning to possible thoracentesis within the next 48 hours. Will repeat x-ray in 48 prior to tap. Acute congestive heart failure  - Likely systolic heart failure, will need echocardiogram in order to assess. Last echocardiogram in the computer is from 2019, and noted EF of 60 to 65% with moderate MR. We will see what the new one looks like  - Agree with Lasix at this time  --check BNP tomorrow. Dementia with behavioral disturbance  --Has been doing well here but advanced    Hypokalemia and hypomagnesemia  --replaced k+ and check mag now and replace as needed. Dysphagia  --Agree with modified barium swallow    Continue remaining treatment per PMD      DNR    More than 50% of the time documented was spent in face-to-face contact with the patient and in the care of the patient on the floor/unit where the patient is located. Thank you very much for this referral.  We appreciate the opportunity to participate in this patient's care. Will follow along with above stated plan.     Espinoza Hare MD

## 2023-03-05 NOTE — FLOWSHEET NOTE
03/05/23 0905   Treatment Team Notification   Reason for Communication Evaluate   Team Member Name [address] [facility] rn   Treatment Team Role Nurse/Charge Nurse  (outreach RN)   Method of Communication Other (Comment)  (clinical rounding)   Response At bedside   RRT Clinical Rounding Nurse Update    Vitals:    03/05/23 0233 03/05/23 0346 03/05/23 0738 03/05/23 0746   BP: (!) 94/55 117/77     Pulse: 72 82 88 88   Resp:  18 18 18   Temp: 98.2 °F (36.8 °C) 97.7 °F (36.5 °C)     TempSrc: Axillary Oral     SpO2: 94% 97% 98% 96%   Weight:       Height:            DETERIORATION INDEX SCORE: 40    ASSESSMENT:  Previous outreach assessment was reviewed. Pt alert and at baseline mentation. Patient sitting on the side of the bed eating breakfast.  Daughter at bedside. Lungs clear except left upper lobe inspiratory wheezing. Pt on 5L HHFNC. Patient given lasix last night, but urinary outputs charted since 3/3/23. Spoke with primary nurse about strict I/O's given her larger left pleural effusion, increased oxygen demands overnight and the need for lasix administration. Patient states she feels better this morning, no pain, and oxygen demands have decreased to 5L. Family updated about the purpose of outreach RN and clinical rounding schedules. PLAN:  Will follow per RRT Clinical Rounding Program protocol.     Dave Truong, [address]: [facility]: [phone]

## 2023-03-05 NOTE — PROGRESS NOTES
RRT Clinical Rounding Nurse Progress Report      SUBJECTIVE: Patient assessed secondary to RN/provider concern - RT informed this RN that patient noted to be on 4 liters NC at start of shift and now is requiring 12 liters Hi flow NC to get 91% 02 saturation. Vitals:    03/04/23 1612 03/04/23 1929 03/04/23 2138 03/04/23 2340   BP: 135/75 (!) 145/94  98/71   Pulse: 88 (!) 130 89 80   Resp: 24 18 18 18   Temp: 99.3 °F (37.4 °C) 99 °F (37.2 °C)  97.9 °F (36.6 °C)   TempSrc: Oral Oral  Oral   SpO2: 92% 96% 92% 91%   Weight:       Height:            DETERIORATION INDEX SCORE: 47    ASSESSMENT:  Patient in bed resting quietly. Bilateral breath sounds diminished. Noted wheezes to left upper lobe. Pt noted to have taken 02 off upon entrance to room. 02 saturation 81% room air. 8 liters Hi Flow applied. PLAN:   Dr. Kristen Sacks called to make her aware of increase 02 demands since beginning of shift. Dr. Kristen Sacks @ bedside. Orders received for STAT CXR and continuous pulse oximetry . Will add pt to list to monitor.     Nancy Mcnamara, [address] [facility]: [facility]: [phone]

## 2023-03-05 NOTE — PROGRESS NOTES
Paged about worsening hypoxia. Was 4L NC earlier today and now on 12L high flow but when I enter room, she has taken off her oxygen. No confusion. PE - alert, no distress, no confusion  Heart - RRR  Lungs - clear except mild inspiratory wheeze to LLL    Order continuous pulse ox. Repeat chest x ray. Once we placed her back on oxygen, her O2 sats were 92% on 8L. I will order pulmicort and PRN albuterol. Update@ 1:29- chest x ray with worsening left pleural effusion.  I have stopped IV fluids and ordered one time dose lasix 20mg IV

## 2023-03-06 ENCOUNTER — APPOINTMENT (OUTPATIENT)
Dept: NON INVASIVE DIAGNOSTICS | Age: 83
DRG: 871 | End: 2023-03-06
Payer: MEDICARE

## 2023-03-06 ENCOUNTER — APPOINTMENT (OUTPATIENT)
Dept: GENERAL RADIOLOGY | Age: 83
DRG: 871 | End: 2023-03-06
Payer: MEDICARE

## 2023-03-06 PROBLEM — I50.31 ACUTE DIASTOLIC (CONGESTIVE) HEART FAILURE (HCC): Status: ACTIVE | Noted: 2023-03-06

## 2023-03-06 PROBLEM — J90 PLEURAL EFFUSION: Status: ACTIVE | Noted: 2023-03-06

## 2023-03-06 LAB
ANION GAP SERPL CALC-SCNC: 4 MMOL/L (ref 2–11)
BASOPHILS # BLD: 0 K/UL (ref 0–0.2)
BASOPHILS NFR BLD: 0 % (ref 0–2)
BUN SERPL-MCNC: 8 MG/DL (ref 8–23)
CALCIUM SERPL-MCNC: 8.2 MG/DL (ref 8.3–10.4)
CHLORIDE SERPL-SCNC: 110 MMOL/L (ref 101–110)
CO2 SERPL-SCNC: 25 MMOL/L (ref 21–32)
CREAT SERPL-MCNC: 0.45 MG/DL (ref 0.6–1)
DIFFERENTIAL METHOD BLD: ABNORMAL
ECHO AO ASC DIAM: 3.3 CM
ECHO AO ASCENDING AORTA INDEX: 2.46 CM/M2
ECHO AO ROOT DIAM: 3.1 CM
ECHO AO ROOT INDEX: 2.31 CM/M2
ECHO AV AREA PEAK VELOCITY: 2.5 CM2
ECHO AV AREA VTI: 2.3 CM2
ECHO AV AREA/BSA PEAK VELOCITY: 1.9 CM2/M2
ECHO AV AREA/BSA VTI: 1.7 CM2/M2
ECHO AV MEAN GRADIENT: 4 MMHG
ECHO AV MEAN VELOCITY: 0.9 M/S
ECHO AV PEAK GRADIENT: 7 MMHG
ECHO AV PEAK VELOCITY: 1.3 M/S
ECHO AV VELOCITY RATIO: 0.92
ECHO AV VTI: 25.8 CM
ECHO BSA: 1.29 M2
ECHO EST RA PRESSURE: 5 MMHG
ECHO LA AREA 2C: 24.4 CM2
ECHO LA AREA 4C: 22.6 CM2
ECHO LA DIAMETER INDEX: 2.16 CM/M2
ECHO LA DIAMETER: 2.9 CM
ECHO LA MAJOR AXIS: 6 CM
ECHO LA MINOR AXIS: 6.4 CM
ECHO LA TO AORTIC ROOT RATIO: 0.94
ECHO LA VOL 2C: 76 ML (ref 22–52)
ECHO LA VOL 4C: 69 ML (ref 22–52)
ECHO LA VOL BP: 74 ML (ref 22–52)
ECHO LA VOL/BSA BIPLANE: 55 ML/M2 (ref 16–34)
ECHO LA VOLUME INDEX A2C: 57 ML/M2 (ref 16–34)
ECHO LA VOLUME INDEX A4C: 51 ML/M2 (ref 16–34)
ECHO LV E' LATERAL VELOCITY: 7 CM/S
ECHO LV E' SEPTAL VELOCITY: 5 CM/S
ECHO LV EDV A2C: 31 ML
ECHO LV EDV A4C: 43 ML
ECHO LV EDV INDEX A4C: 32 ML/M2
ECHO LV EDV NDEX A2C: 23 ML/M2
ECHO LV EJECTION FRACTION A2C: 62 %
ECHO LV EJECTION FRACTION A4C: 64 %
ECHO LV EJECTION FRACTION BIPLANE: 64 % (ref 55–100)
ECHO LV ESV A2C: 12 ML
ECHO LV ESV A4C: 16 ML
ECHO LV ESV INDEX A2C: 9 ML/M2
ECHO LV ESV INDEX A4C: 12 ML/M2
ECHO LV FRACTIONAL SHORTENING: 32 % (ref 28–44)
ECHO LV INTERNAL DIMENSION DIASTOLE INDEX: 2.54 CM/M2
ECHO LV INTERNAL DIMENSION DIASTOLIC: 3.4 CM (ref 3.9–5.3)
ECHO LV INTERNAL DIMENSION SYSTOLIC INDEX: 1.72 CM/M2
ECHO LV INTERNAL DIMENSION SYSTOLIC: 2.3 CM
ECHO LV IVSD: 0.9 CM (ref 0.6–0.9)
ECHO LV MASS 2D: 84.9 G (ref 67–162)
ECHO LV MASS INDEX 2D: 63.3 G/M2 (ref 43–95)
ECHO LV POSTERIOR WALL DIASTOLIC: 0.9 CM (ref 0.6–0.9)
ECHO LV RELATIVE WALL THICKNESS RATIO: 0.53
ECHO LVOT AREA: 2.8 CM2
ECHO LVOT AV VTI INDEX: 0.83
ECHO LVOT DIAM: 1.9 CM
ECHO LVOT MEAN GRADIENT: 3 MMHG
ECHO LVOT PEAK GRADIENT: 5 MMHG
ECHO LVOT PEAK VELOCITY: 1.2 M/S
ECHO LVOT STROKE VOLUME INDEX: 45 ML/M2
ECHO LVOT SV: 60.4 ML
ECHO LVOT VTI: 21.3 CM
ECHO MV A VELOCITY: 0.9 M/S
ECHO MV E DECELERATION TIME (DT): 264 MS
ECHO MV E VELOCITY: 0.7 M/S
ECHO MV E/A RATIO: 0.78
ECHO MV E/E' LATERAL: 10
ECHO MV E/E' RATIO (AVERAGED): 12
ECHO MV E/E' SEPTAL: 14
ECHO PV ACCELERATION TIME (AT): 106 MS
ECHO PV MAX VELOCITY: 0.9 M/S
ECHO PV PEAK GRADIENT: 3 MMHG
ECHO RIGHT VENTRICULAR SYSTOLIC PRESSURE (RVSP): 30 MMHG
ECHO RV BASAL DIMENSION: 3.2 CM
ECHO RV FREE WALL PEAK S': 15 CM/S
ECHO RV TAPSE: 2.1 CM (ref 1.7–?)
ECHO TV REGURGITANT MAX VELOCITY: 2.49 M/S
ECHO TV REGURGITANT PEAK GRADIENT: 25 MMHG
EOSINOPHIL # BLD: 0.1 K/UL (ref 0–0.8)
EOSINOPHIL NFR BLD: 1 % (ref 0.5–7.8)
ERYTHROCYTE [DISTWIDTH] IN BLOOD BY AUTOMATED COUNT: 13 % (ref 11.9–14.6)
GLUCOSE SERPL-MCNC: 103 MG/DL (ref 65–100)
HCT VFR BLD AUTO: 28.5 % (ref 35.8–46.3)
HGB BLD-MCNC: 9.8 G/DL (ref 11.7–15.4)
IMM GRANULOCYTES # BLD AUTO: 0.1 K/UL (ref 0–0.5)
IMM GRANULOCYTES NFR BLD AUTO: 1 % (ref 0–5)
LV EF: 58 %
LVEF MODALITY: ABNORMAL
LYMPHOCYTES # BLD: 1.3 K/UL (ref 0.5–4.6)
LYMPHOCYTES NFR BLD: 15 % (ref 13–44)
MAGNESIUM SERPL-MCNC: 2.1 MG/DL (ref 1.8–2.4)
MCH RBC QN AUTO: 32.2 PG (ref 26.1–32.9)
MCHC RBC AUTO-ENTMCNC: 34.4 G/DL (ref 31.4–35)
MCV RBC AUTO: 93.8 FL (ref 82–102)
MONOCYTES # BLD: 1 K/UL (ref 0.1–1.3)
MONOCYTES NFR BLD: 11 % (ref 4–12)
NEUTS SEG # BLD: 6.5 K/UL (ref 1.7–8.2)
NEUTS SEG NFR BLD: 73 % (ref 43–78)
NRBC # BLD: 0 K/UL (ref 0–0.2)
NT PRO BNP: 3914 PG/ML
PLATELET # BLD AUTO: 243 K/UL (ref 150–450)
PMV BLD AUTO: 11.1 FL (ref 9.4–12.3)
POTASSIUM SERPL-SCNC: 4.1 MMOL/L (ref 3.5–5.1)
RBC # BLD AUTO: 3.04 M/UL (ref 4.05–5.2)
SODIUM SERPL-SCNC: 139 MMOL/L (ref 133–143)
WBC # BLD AUTO: 8.9 K/UL (ref 4.3–11.1)

## 2023-03-06 PROCEDURE — 80048 BASIC METABOLIC PNL TOTAL CA: CPT

## 2023-03-06 PROCEDURE — 83735 ASSAY OF MAGNESIUM: CPT

## 2023-03-06 PROCEDURE — 2500000003 HC RX 250 WO HCPCS: Performed by: INTERNAL MEDICINE

## 2023-03-06 PROCEDURE — 6360000002 HC RX W HCPCS: Performed by: FAMILY MEDICINE

## 2023-03-06 PROCEDURE — 94761 N-INVAS EAR/PLS OXIMETRY MLT: CPT

## 2023-03-06 PROCEDURE — A4216 STERILE WATER/SALINE, 10 ML: HCPCS | Performed by: INTERNAL MEDICINE

## 2023-03-06 PROCEDURE — 6360000002 HC RX W HCPCS: Performed by: INTERNAL MEDICINE

## 2023-03-06 PROCEDURE — 93306 TTE W/DOPPLER COMPLETE: CPT

## 2023-03-06 PROCEDURE — 1100000000 HC RM PRIVATE

## 2023-03-06 PROCEDURE — 99232 SBSQ HOSP IP/OBS MODERATE 35: CPT | Performed by: INTERNAL MEDICINE

## 2023-03-06 PROCEDURE — 85025 COMPLETE CBC W/AUTO DIFF WBC: CPT

## 2023-03-06 PROCEDURE — 74230 X-RAY XM SWLNG FUNCJ C+: CPT

## 2023-03-06 PROCEDURE — 6370000000 HC RX 637 (ALT 250 FOR IP): Performed by: INTERNAL MEDICINE

## 2023-03-06 PROCEDURE — 2700000000 HC OXYGEN THERAPY PER DAY

## 2023-03-06 PROCEDURE — 94640 AIRWAY INHALATION TREATMENT: CPT

## 2023-03-06 PROCEDURE — 93306 TTE W/DOPPLER COMPLETE: CPT | Performed by: INTERNAL MEDICINE

## 2023-03-06 PROCEDURE — 97535 SELF CARE MNGMENT TRAINING: CPT

## 2023-03-06 PROCEDURE — 92611 MOTION FLUOROSCOPY/SWALLOW: CPT

## 2023-03-06 PROCEDURE — 97530 THERAPEUTIC ACTIVITIES: CPT

## 2023-03-06 PROCEDURE — 71045 X-RAY EXAM CHEST 1 VIEW: CPT

## 2023-03-06 PROCEDURE — 83880 ASSAY OF NATRIURETIC PEPTIDE: CPT

## 2023-03-06 PROCEDURE — 36415 COLL VENOUS BLD VENIPUNCTURE: CPT

## 2023-03-06 PROCEDURE — 2580000003 HC RX 258: Performed by: INTERNAL MEDICINE

## 2023-03-06 RX ADMIN — BARIUM SULFATE 30 ML: 0.81 POWDER, FOR SUSPENSION ORAL at 13:22

## 2023-03-06 RX ADMIN — DONEPEZIL HYDROCHLORIDE 10 MG: 5 TABLET ORAL at 20:37

## 2023-03-06 RX ADMIN — FUROSEMIDE 20 MG: 10 INJECTION, SOLUTION INTRAMUSCULAR; INTRAVENOUS at 17:17

## 2023-03-06 RX ADMIN — BARIUM SULFATE 30 ML: 400 PASTE ORAL at 13:10

## 2023-03-06 RX ADMIN — VERAPAMIL HYDROCHLORIDE 180 MG: 180 TABLET, FILM COATED, EXTENDED RELEASE ORAL at 20:37

## 2023-03-06 RX ADMIN — GUAIFENESIN 600 MG: 600 TABLET, EXTENDED RELEASE ORAL at 20:37

## 2023-03-06 RX ADMIN — FUROSEMIDE 20 MG: 10 INJECTION, SOLUTION INTRAMUSCULAR; INTRAVENOUS at 08:55

## 2023-03-06 RX ADMIN — CEFTRIAXONE 1000 MG: 1 INJECTION, POWDER, FOR SOLUTION INTRAMUSCULAR; INTRAVENOUS at 17:18

## 2023-03-06 RX ADMIN — TUBERCULIN PURIFIED PROTEIN DERIVATIVE 5 UNITS: 5 INJECTION, SOLUTION INTRADERMAL at 17:18

## 2023-03-06 RX ADMIN — SODIUM CHLORIDE, PRESERVATIVE FREE 10 ML: 5 INJECTION INTRAVENOUS at 08:59

## 2023-03-06 RX ADMIN — QUETIAPINE FUMARATE 25 MG: 25 TABLET ORAL at 20:37

## 2023-03-06 RX ADMIN — GUAIFENESIN 600 MG: 600 TABLET, EXTENDED RELEASE ORAL at 08:55

## 2023-03-06 RX ADMIN — SODIUM CHLORIDE, PRESERVATIVE FREE 10 ML: 5 INJECTION INTRAVENOUS at 20:37

## 2023-03-06 RX ADMIN — CYANOCOBALAMIN TAB 1000 MCG 1000 MCG: 1000 TAB at 08:55

## 2023-03-06 RX ADMIN — BUDESONIDE 500 MCG: 0.5 INHALANT RESPIRATORY (INHALATION) at 21:32

## 2023-03-06 RX ADMIN — FAMOTIDINE 20 MG: 10 INJECTION, SOLUTION INTRAVENOUS at 08:56

## 2023-03-06 RX ADMIN — ATORVASTATIN CALCIUM 80 MG: 40 TABLET, FILM COATED ORAL at 20:37

## 2023-03-06 ASSESSMENT — PAIN SCALES - GENERAL: PAINLEVEL_OUTOF10: 0

## 2023-03-06 NOTE — FLOWSHEET NOTE
RRT Clinical Rounding Nurse Progress Report      SUBJECTIVE: Patient assessed secondary to RN/provider concern - increasing oxygen demands. Vitals:    03/05/23 0746 03/05/23 1145 03/05/23 1925 03/05/23 2245   BP:  126/71 (!) 143/79    Pulse: 88 72 94 82   Resp: 18 22 18 18   Temp:  98.1 °F (36.7 °C) 98.1 °F (36.7 °C)    TempSrc:  Oral Oral    SpO2: 96% 99% 99% 94%   Weight:       Height:                 ASSESSMENT:  Pt much better tonight per nursing staff. Primary RN at bedside sitting with her to keep her calm. She is confused but remaining calm. Oxygen demands have stabilized and primary RN with no immediate concerns. PLAN:  Will follow per RRT Clinical Rounding Program protocol.     [address] [place] [address]: [facility]: [phone]

## 2023-03-06 NOTE — PROGRESS NOTES
ACUTE OCCUPATIONAL THERAPY GOALS:   (Developed with and agreed upon by patient and/or caregiver.)  1. Patient will perform grooming with SPV and adaptive equipment as needed. PROGRESSING 3/6/23   2. Patient will perform upper body dressing with SPV and adaptive equipment as needed. 3. Patient will perform lower body dressing with SPV and adaptive equipment as needed. PROGRESSING 3/6/23   4. Patient will perform bathing with MIN A and adaptive equipment as needed. 5. Patient will perform toileting and toilet transfer with SBA and adaptive equipment as needed. PROGRESSING 3/6/23   6. Patient will perform ADL functional mobility and tranfers in room with SPV and adaptive equipment as needed. PROGRESSING 3/6/23   7. Patient/family to demonstrate knowledge of home safety and DME recommendations.      Timeframe: 7 visits    OCCUPATIONAL THERAPY: Daily Note AM   OT Visit Days: 2   Time In/Out  OT Charge Capture  Rehab Caseload Tracker  OT Orders    Eren Garcia is a 80 y.o. female   PRIMARY DIAGNOSIS: Sepsis ([address]  Confusion [R41.0]  Generalized weakness [R53.1]  Sepsis ([address] [A41.9]  Pneumonia of left lower lobe due to infectious organism [J18.9]  Cerebrovascular accident (CVA), unspecified mechanism ([address] [I63.9]  Dementia without behavioral disturbance, psychotic disturbance, mood disturbance, or anxiety, unspecified dementia severity, unspecified dementia type ([address] [F03.90]       Inpatient: Payor: MEDICARE / Plan: MEDICARE PART A AND B / Product Type: *No Product type* /     ASSESSMENT:     REHAB RECOMMENDATIONS: CURRENT LEVEL OF FUNCTION:  (Most Recently Demonstrated)   Recommendation to date pending progress:  Setting:  Short-term Rehab vs [facility]    Equipment:    To Be Determined Bathing:  Not Tested  Dressing:  Minimal Assist  Feeding/Grooming:  Contact Guard Assist  Toileting:  Minimal Assist  Functional Mobility:  Minimal Assist-Contact Guard Assist     ASSESSMENT:  Ms. Tyrell Fonseca presents supine in bed and agreeable to therapy session. She performed bed mobility, LB dressing, household mobility, toileting, grooming and back to bed with assistance as charted below. Her SpO2 remained stable on 5L NC during all activity. She remains slightly pleasantly confused. Family is requesting short term rehab upon discharge. Continue per OT POC.        SUBJECTIVE:     Ms. Benny Jara states she wants to nap now    Social/Functional Lives With: Spouse  Type of Home: House  Home Layout: Two level, Able to Live on Main level with bedroom/bathroom  Home Access: Stairs to enter with rails  Entrance Stairs - Number of Steps: 3  Entrance Stairs - Rails: Both  Bathroom Shower/Tub: Walk-in shower  Bathroom Toilet: Standard  Receives Help From: Family  ADL Assistance: Needs assistance  Ambulation Assistance: Independent  Transfer Assistance: Independent    OBJECTIVE:     Kwasi Tellez / Tucker Bazan / Mary Proud:  O2 via NC    RESTRICTIONS/PRECAUTIONS:  Restrictions/Precautions  Restrictions/Precautions: Fall Risk    PAIN: Erroll Abbot / O2:   Pre Treatment:    0/10      Post Treatment: no c/o pain during session Vitals          Oxygen  O2 Device: High flow nasal cannula  O2 Flow Rate (L/min): 5 L/min  SpO2: 97 %     MOBILITY: I Mod I S SBA CGA Min Mod Max Total  NT x2 Comments:   Bed Mobility    Rolling [] [] [] [] [] [] [] [] [] [] []    Supine to Sit [] [] [] [x] [] [] [] [] [] [] []    Scooting [] [] [] [x] [] [] [] [] [] [] []    Sit to Supine [] [] [] [x] [] [] [] [] [] [] []    Transfers    Sit to Stand [] [] [] [] [x] [] [] [] [] [] []    Bed to Chair [] [] [] [] [] [] [] [] [] [] []    Stand to Sit [] [] [] [] [x] [] [] [] [] [] []    Tub/Shower [] [] [] [] [] [] [] [] [] [] []     Toilet [] [] [] [] [x] [x] [] [] [] [] []      [] [] [] [] [] [] [] [] [] [] []    I=Independent, Mod I=Modified Independent, S=Supervision/Setup, SBA=Standby Assistance, CGA=Contact Guard Assistance, Min=Minimal Assistance, Mod=Moderate Assistance, Max=Maximal Assistance, Total=Total Assistance, NT=Not Tested    ACTIVITIES OF DAILY LIVING: I Mod I S SBA CGA Min Mod Max Total NT Comments   BASIC ADLs:              Upper Body Bathing [] [] [] [] [] [] [] [] [] []    Lower Body Bathing [] [] [] [] [] [] [] [] [] []    Toileting [] [] [] [] [] [x] [] [] [] []    Upper Body Dressing [] [] [] [] [] [] [] [] [] []    Lower Body Dressing [] [] [] [] [x] [] [] [] [] []    Feeding [] [] [] [] [] [] [] [] [] []    Grooming [] [] [] [] [x] [] [] [] [] []    Personal Device Care [] [] [] [] [] [] [] [] [] []    Functional Mobility [] [] [] [] [] [x] [] [] [] []    I=Independent, Mod I=Modified Independent, S=Supervision/Setup, SBA=Standby Assistance, CGA=Contact Guard Assistance, Min=Minimal Assistance, Mod=Moderate Assistance, Max=Maximal Assistance, Total=Total Assistance, NT=Not Tested    BALANCE: Good Fair+ Fair Fair- Poor NT Comments   Sitting Static [x] [] [] [] [] []    Sitting Dynamic [x] [x] [] [] [] []              Standing Static [] [x] [x] [] [] []    Standing Dynamic [] [] [x] [] [] []        PLAN:     FREQUENCY/DURATION   OT Plan of Care: 3 times/week for duration of hospital stay or until stated goals are met, whichever comes first.    TREATMENT:     TREATMENT:   Co-Treatment PT/OT necessary due to patient's decreased overall endurance/tolerance levels, as well as need for high level skilled assistance to complete functional transfers/mobility and functional tasks  Self Care (25 minutes): Patient participated in toileting, lower body dressing, and grooming ADLs in unsupported sitting and standing with minimal verbal, manual, and tactile cueing to increase independence, increase activity tolerance, and increase safety awareness. Patient also participated in functional mobility and functional transfer training to increase independence, increase activity tolerance, and increase safety awareness.      TREATMENT GRID:  N/A    AFTER TREATMENT PRECAUTIONS: Bed, Bed/Chair Locked, Call light within reach, Needs within reach, RN notified, and sitter at bedside    INTERDISCIPLINARY COLLABORATION:  RN/ PCT and PT/ PTA    EDUCATION:  Education Given To: Patient  Education Provided: Role of Therapy;Plan of Care; Fall Prevention Strategies;Orientation  Education Method: Demonstration;Verbal  Barriers to Learning: None;Cognition  Education Outcome: Continued education needed;Verbalized understanding    TOTAL TREATMENT DURATION AND TIME:  Time In: 1000  Time Out: Doyle Barrios  Minutes: [address], [place]

## 2023-03-06 NOTE — CARE COORDINATION
Interdisciplinary team meeting with attending, CM, nursing, PT and nutritional services for plan of care Patient to have pulmonary follow up. CM spoke with daughter and updated on d/c options. They are looking into [facility] at [address] [facility] and also asking about rehab. Referrals have been sent to [address] [place] [address] and [facility] per request to see if they will consider. Updated daughter about PT recommendations for home health however patient spouse is also in the hospital and he is the primary care giver. Daughter has also been in contact with [facility] to see if they help with finding a place. Patient currently on 5 liters NC and pulmonary work up. D/C plan pending clinical progress but likely SNF vs senior living. CM following.

## 2023-03-06 NOTE — PROGRESS NOTES
Hospitalist Progress Note   Admit Date:  3/2/2023  4:09 PM   Name:  Alecia Hernandez   Age:  80 y.o. Sex:  female  :  1940   MRN:  [id number]   Room:  [facility]/    Presenting Complaint: Facial Droop     Reason(s) for Admission: Confusion [R41.0]  Generalized weakness [R53.1]  Sepsis ([facility] [address] [A41.9]  Pneumonia of left lower lobe due to infectious organism [J18.9]  Cerebrovascular accident (CVA), unspecified mechanism ([facility] [address] [I63.9]  Dementia without behavioral disturbance, psychotic disturbance, mood disturbance, or anxiety, unspecified dementia severity, unspecified dementia type [facility]) [F03.90]     Hospital Course:     From history and physical HPI:  Alecia Hernandez is a 80 y.o. female with medical history of paroxysmal atrial fibrillation on Xarelto and verapamil rate control, hypertension and dementia with behavioral abnormalities on Seroquel nocturnally. She has been visiting her  in the hospital with a hernia recently and has had fatigue and is staying with family. Family reports that patient was difficult to arouse this morning weak and could not stand. Noticed increased temp complained of left shoulder plain and family felt possible left-sided weakness and possible left facial droop. Droop not felt to be significant by ER or neurology and initial work-up for code stroke negative. Patient found to have sepsis with fever leukocytosis and a new left lower lobe pneumonia. Neurology feels symptoms likely related to her baseline dementia with acute infectious process. Patient after receiving IV fluids somewhat more alert conversant but alert to person only. Pleasant and cooperative and maintains social graces. Discussed with daughter and son at bedside and patient to be admitted and will honor DO NOT RESUSCITATE status. Patient has had outpatient palliative care consultations.   Patient does have behavioral abnormality with her dementia with nocturnal confusion and will continue her nocturnal Seroquel but family expects she could have aggravation of behavioral abnormalities with change in environment during hospitalization. Discussed planned antibiotics supportive care and continued home meds including anticoagulation and rate control regarding atrial fibrillation. No dysuria. Serum creatinine okay. Lactic acid pending. A-fib apparently paroxysmal and EKG was sinus mechanism noted. Subjective & 24hr Events (03/06/23): Admitted with pneumonia was staying with daughter  hospitalized. Developed acute hypoxemic respiratory failure worsened from time of admission and found to have large left pleural effusion. Appears to be responding to diuresis. Echocardiogram with diastolic dysfunction but preserved LVEF. Chronic A-fib paroxysmal.  Rate controlled lower dose verapamil from prehospital dose. BP appears to be improving. ACE inhibitor was held. More alert today. Assessment & Plan:      Principal Problem:    Sepsis ([facility] [address]  Plan: Secondary to pneumonia. We will utilize sepsis protocol. Risk-benefit of aggressive IV hydration given patient age and status and BP stable. Creatinine normal.  Ceftriaxone and azithromycin. Blood cultures. Further intervention pending initial response and clinical stability  3/3--continue same care-continue current rate IV fluids at least another 24 hours and then consider attenuate given age if BP stable   3/4--clinically improving-continue same care-May attenuate IV fluids  3/5--procalcitonin level and CBC improved. Continue current antibiotics. See pneumonia below-large left pleural effusion  3/6-continue same markers of infection improved and clinically appears improved    Acute hypoxemic respiratory failure  3/5--diuresed-low serum albumin likely contributing to pulmonary edema and need LVEF assessment.   Check BNP and echocardiogram.  Fixed dose Lasix and monitor electrolytes closely-significant hypokalemia being replaced. Wean oxygen as able with diuresis. Initial low-dose Lasix appears to have helped alveolar-arterial gradient. 3/6--FiO2 decreased with diuresis-continue monitoring continue current dose loop diuretic      Hypoalbuminemia  3/5--- encourage oral intake. Given large pleural effusion/pulmonary edema may need IV albumin supplement    Large left pleural effusion:  3/5--follow-up chest x-ray following diuresis. Family request pulmonology consult evaluate for possible thoracentesis if not diuresed away significantly responsive to diuresis. Cannot exclude parapneumonic effusion  3/6--pulmonary possible thoracentesis tomorrow-Xarelto held last dose was March 4    Hypokalemia-  3/6--supplement per standing orders    Debility-  3/5-significant clinical deterioration since admission. May need PT/OT to reevaluate for proper discharge disposition. 3/6--as per PT. Active Problems:    Left lower lobe pneumonia  Plan: Above antibiotics, mucolytic, as needed bronchodilators. 3/3No significant productive cough-consider sputum evaluation but has received multiple doses of antibiotics.-Continue ceftriaxone and azithromycin full course. Continue pulmonary toilet, supportive care. 3/4--blood cultures no growth to date-continue supportive care and current antibiotics. 3/5--continue current antibiotics-respiratory status aggravated by large left pleural effusion-see above cannot exclude parapneumonic  3/6-continue same        Dementia with behavioral disturbance  Plan: Continue home meds including Aricept and Seroquel. Monitor for behavioral disturbance. 3/3--continue home medications-did well last evening-monitor  3/4--some confusion last evening per daughter  3/5--slept poorly last evening. 3/6-alert cooperative pleasant more alert today.       Essential hypertension  3/3--- lisinopril held-slowly restart as clinically improves-monitor  3/4--continue to hold ACE inhibitor-tolerated lower dose long-acting verapamil. Resume home meds as BP improves with clinical improvement-monitor for now.  3/6---? Timing restart ACE inhibitor. Continue lower dose long-acting verapamil for now      Paroxysmal atrial fibrillation (HCC)  Plan: Continue home verapamil and anticoagulation. Xarelto. 3/3--- verapamil held last evening. Try restart tonight at lower dose 180 mg to replace 240 mg dosage and observe. She has paroxysmal A-fib-hold if BP remains low. Continue IV hydration. 3/4--- continue same. 3/5--continue lower dose verapamil-restart ACE inhibitor as BP allows        PT/OT evals and PPD needed/ordered? Yes  Diet: Diet NPO Exceptions are: Sips of Water with Meds, Sips of Clear Liquids  VTE prophylaxis: Already on anticoagulation  Code status: DNR               Hospital Problems:  Principal Problem:    Sepsis ([address]  Active Problems:    Left lower lobe pneumonia    Dementia with behavioral disturbance    Dysphagia    Acute hypoxemic respiratory failure (HCC)    Pleural effusion    Acute diastolic (congestive) heart failure (HCC)    Essential hypertension    Paroxysmal atrial fibrillation (HCC)  Resolved Problems:    * No resolved hospital problems. *      Objective:   Patient Vitals for the past 24 hrs:   Temp Pulse Resp BP SpO2   03/06/23 1132 98.8 °F (37.1 °C) 68 17 126/63 100 %   03/06/23 1020 -- -- -- -- 97 %   03/06/23 0751 98.4 °F (36.9 °C) 77 16 120/72 94 %   03/06/23 0405 98.6 °F (37 °C) 69 18 (!) 97/56 92 %   03/05/23 2329 97.9 °F (36.6 °C) 82 16 (!) 92/52 92 %   03/05/23 2245 -- 82 18 -- 94 %   03/05/23 1925 98.1 °F (36.7 °C) 94 18 (!) 143/79 99 %       Oxygen Therapy  SpO2: 100 %  Pulse Oximeter Device Mode: Intermittent  Pulse Oximeter Device Location: Right, Finger  O2 Device: Nasal cannula  O2 Flow Rate (L/min): 5 L/min    Estimated body mass index is 20.48 kg/m² as calculated from the following:    Height as of this encounter: 4' 10\" (1.473 m).     Weight as of this encounter: 98 lb (44.5 kg).    Intake/Output Summary (Last 24 hours) at 3/6/2023 1359  Last data filed at 3/6/2023 0405  Gross per 24 hour   Intake --   Output 600 ml   Net -600 ml         Physical Exam:   Blood pressure 126/63, pulse 68, temperature 98.8 °F (37.1 °C), temperature source Oral, resp. rate 17, height 4' 10\" (1.473 m), weight 98 lb (44.5 kg), SpO2 100 %. Physical Exam:   General:                  Significant increased lethargy today-difficult to arouse. Slept poorly last evening given respiratory status and behavioral abnormalities. Also electrolyte abnormalities suspect responsible. Patient able to verbalize  Eyes:                                           Conjunctivae/corneas clear. Pupils equally round and reactive to light, extraocular movements intact. Lungs:                 Decreased breath sounds bilateral.  No gross wheezing. Heart:                    Irregular, rate controlled. No gross gallop. Suspect HJR  Abdomen:                       Soft, non-tender. Bowel sounds normal. No masses,  No organomegaly. Extremities:                     Extremities normal, atraumatic, no cyanosis or unilateral lower extremity edema  Neurologic:                     CNII-XII intact. Increased weakness since admission. No focal deficits. Lab/Data Review: All lab results for the last 24 hours reviewed.         I have personally reviewed labs and tests:  Recent Labs:  Recent Results (from the past 48 hour(s))   CBC with Auto Differential    Collection Time: 03/05/23  6:09 AM   Result Value Ref Range    WBC 10.0 4.3 - 11.1 K/uL    RBC 3.09 (L) 4.05 - 5.2 M/uL    Hemoglobin 10.1 (L) 11.7 - 15.4 g/dL    Hematocrit 29.1 (L) 35.8 - 46.3 %    MCV 94.2 82.0 - 102.0 FL    MCH 32.7 26.1 - 32.9 PG    MCHC 34.7 31.4 - 35.0 g/dL    RDW 13.1 11.9 - 14.6 %    Platelets 111 766 - 369 K/uL    MPV 11.5 9.4 - 12.3 FL    nRBC 0.00 0.0 - 0.2 K/uL    Differential Type AUTOMATED      Seg Neutrophils 78 43 - 78 %    Lymphocytes 11 (L) 13 - 44 %    Monocytes 9 4.0 - 12.0 %    Eosinophils % 1 0.5 - 7.8 %    Basophils 0 0.0 - 2.0 %    Immature Granulocytes 1 0.0 - 5.0 %    Segs Absolute 7.8 1.7 - 8.2 K/UL    Absolute Lymph # 1.1 0.5 - 4.6 K/UL    Absolute Mono # 0.9 0.1 - 1.3 K/UL    Absolute Eos # 0.1 0.0 - 0.8 K/UL    Basophils Absolute 0.0 0.0 - 0.2 K/UL    Absolute Immature Granulocyte 0.1 0.0 - 0.5 K/UL   Comprehensive Metabolic Panel w/ Reflex to MG    Collection Time: 03/05/23  6:09 AM   Result Value Ref Range    Sodium 139 133 - 143 mmol/L    Potassium 2.9 (L) 3.5 - 5.1 mmol/L    Chloride 110 101 - 110 mmol/L    CO2 24 21 - 32 mmol/L    Anion Gap 5 2 - 11 mmol/L    Glucose 96 65 - 100 mg/dL    BUN 8 8 - 23 MG/DL    Creatinine 0.48 (L) 0.6 - 1.0 MG/DL    Est, Glom Filt Rate >60 >60 ml/min/1.73m2    Calcium 8.1 (L) 8.3 - 10.4 MG/DL    Total Bilirubin 0.4 0.2 - 1.1 MG/DL    ALT 25 12 - 65 U/L    AST 28 15 - 37 U/L    Alk Phosphatase 101 50 - 136 U/L    Total Protein 5.0 (L) 6.3 - 8.2 g/dL    Albumin 1.9 (L) 3.2 - 4.6 g/dL    Globulin 3.1 2.8 - 4.5 g/dL    Albumin/Globulin Ratio 0.6 0.4 - 1.6     Procalcitonin    Collection Time: 03/05/23  6:09 AM   Result Value Ref Range    Procalcitonin 1.24 (H) 0.00 - 0.49 ng/mL   Magnesium    Collection Time: 03/05/23  6:09 AM   Result Value Ref Range    Magnesium 2.0 1.8 - 2.4 mg/dL   Brain Natriuretic Peptide    Collection Time: 03/05/23  6:09 AM   Result Value Ref Range    NT Pro-BNP 6,482 (H) <450 PG/ML   Magnesium    Collection Time: 03/05/23  6:09 AM   Result Value Ref Range    Magnesium 2.0 1.8 - 2.4 mg/dL   Basic Metabolic Panel    Collection Time: 03/05/23  3:05 PM   Result Value Ref Range    Sodium 137 133 - 143 mmol/L    Potassium 2.8 (L) 3.5 - 5.1 mmol/L    Chloride 105 101 - 110 mmol/L    CO2 25 21 - 32 mmol/L    Anion Gap 7 2 - 11 mmol/L    Glucose 108 (H) 65 - 100 mg/dL    BUN 7 (L) 8 - 23 MG/DL    Creatinine 0.73 0.6 - 1.0 MG/DL    Est, Glom Filt Rate >60 >60 ml/min/1.73m2    Calcium 8.8 8.3 - 10.4 MG/DL   CBC with Auto Differential    Collection Time: 03/06/23  4:50 AM   Result Value Ref Range    WBC 8.9 4.3 - 11.1 K/uL    RBC 3.04 (L) 4.05 - 5.2 M/uL    Hemoglobin 9.8 (L) 11.7 - 15.4 g/dL    Hematocrit 28.5 (L) 35.8 - 46.3 %    MCV 93.8 82.0 - 102.0 FL    MCH 32.2 26.1 - 32.9 PG    MCHC 34.4 31.4 - 35.0 g/dL    RDW 13.0 11.9 - 14.6 %    Platelets 557 499 - 989 K/uL    MPV 11.1 9.4 - 12.3 FL    nRBC 0.00 0.0 - 0.2 K/uL    Differential Type AUTOMATED      Seg Neutrophils 73 43 - 78 %    Lymphocytes 15 13 - 44 %    Monocytes 11 4.0 - 12.0 %    Eosinophils % 1 0.5 - 7.8 %    Basophils 0 0.0 - 2.0 %    Immature Granulocytes 1 0.0 - 5.0 %    Segs Absolute 6.5 1.7 - 8.2 K/UL    Absolute Lymph # 1.3 0.5 - 4.6 K/UL    Absolute Mono # 1.0 0.1 - 1.3 K/UL    Absolute Eos # 0.1 0.0 - 0.8 K/UL    Basophils Absolute 0.0 0.0 - 0.2 K/UL    Absolute Immature Granulocyte 0.1 0.0 - 0.5 K/UL   Brain Natriuretic Peptide    Collection Time: 03/06/23  4:50 AM   Result Value Ref Range    NT Pro-BNP 3,914 (H) <450 PG/ML   Basic Metabolic Panel w/ Reflex to MG    Collection Time: 03/06/23  4:50 AM   Result Value Ref Range    Sodium 139 133 - 143 mmol/L    Potassium 4.1 3.5 - 5.1 mmol/L    Chloride 110 101 - 110 mmol/L    CO2 25 21 - 32 mmol/L    Anion Gap 4 2 - 11 mmol/L    Glucose 103 (H) 65 - 100 mg/dL    BUN 8 8 - 23 MG/DL    Creatinine 0.45 (L) 0.6 - 1.0 MG/DL    Est, Glom Filt Rate >60 >60 ml/min/1.73m2    Calcium 8.2 (L) 8.3 - 10.4 MG/DL   Magnesium    Collection Time: 03/06/23  4:50 AM   Result Value Ref Range    Magnesium 2.1 1.8 - 2.4 mg/dL   Transthoracic echocardiogram (TTE) complete with contrast, bubble, strain, and 3D PRN    Collection Time: 03/06/23  9:50 AM   Result Value Ref Range    LV EDV A2C 31 mL    LV EDV A4C 43 mL    LV ESV A2C 12 mL    LV ESV A4C 16 mL    IVSd 0.9 0.6 - 0.9 cm    LVIDd 3.4 (A) 3.9 - 5.3 cm    LVIDs 2.3 cm    LVOT Diameter 1.9 cm    LVOT Mean Gradient 3 mmHg    LVOT VTI 21.3 cm    LVOT Peak Velocity 1.2 m/s    LVOT Peak Gradient 5 mmHg    LVPWd 0.9 0.6 - 0.9 cm    LV E' Lateral Velocity 7 cm/s    LV E' Septal Velocity 5 cm/s    LV Ejection Fraction A2C 62 %    LV Ejection Fraction A4C 64 %    EF BP 64 55 - 100 %    LVOT Area 2.8 cm2    LVOT SV 60.4 ml    LA Minor Axis 6.4 cm    LA Major Axis 6.0 cm    LA Area 2C 24.4 cm2    LA Area 4C 22.6 cm2    LA Volume 2C 76 (A) 22 - 52 mL    LA Volume 4C 69 (A) 22 - 52 mL    LA Volume BP 74 (A) 22 - 52 mL    LA Diameter 2.9 cm    AV Mean Velocity 0.9 m/s    AV Mean Gradient 4 mmHg    AV VTI 25.8 cm    AV Peak Velocity 1.3 m/s    AV Peak Gradient 7 mmHg    AV Area by VTI 2.3 cm2    AV Area by Peak Velocity 2.5 cm2    Aortic Root 3.1 cm    Ascending Aorta 3.3 cm    MV E Wave Deceleration Time 264.0 ms    MV A Velocity 0.90 m/s    MV E Velocity 0.70 m/s    PV .0 ms    PV Max Velocity 0.9 m/s    PV Peak Gradient 3 mmHg    RV Basal Dimension 3.2 cm    RV Free Wall Peak S' 15 cm/s    TAPSE 2.1 1.7 cm    TR Max Velocity 2.49 m/s    TR Peak Gradient 25 mmHg    Body Surface Area 1.29 m2    Fractional Shortening 2D 32 28 - 44 %    LV ESV Index A4C 12 mL/m2    LV EDV Index A4C 32 mL/m2    LV ESV Index A2C 9 mL/m2    LV EDV Index A2C 23 mL/m2    LVIDd Index 2.54 cm/m2    LVIDs Index 1.72 cm/m2    LV RWT Ratio 0.53     LV Mass 2D 84.9 67 - 162 g    LV Mass 2D Index 63.3 43 - 95 g/m2    MV E/A 0.78     E/E' Ratio (Averaged) 12.00     E/E' Lateral 10.00     E/E' Septal 14.00     LA Volume Index BP 55 (A) 16 - 34 ml/m2    LVOT Stroke Volume Index 45.0 mL/m2    LA Volume Index 2C 57 (A) 16 - 34 mL/m2    LA Volume Index 4C 51 (A) 16 - 34 mL/m2    LA Size Index 2.16 cm/m2    LA/AO Root Ratio 0.94     Ao Root Index 2.31 cm/m2    Ascending Aorta Index 2.46 cm/m2    AV Velocity Ratio 0.92     LVOT:AV VTI Index 0.83     PETTY/BSA VTI 1.7 cm2/m2    PETTY/BSA Peak Velocity 1.9 cm2/m2    Est. RA Pressure 5 mmHg    RVSP 30 mmHg       I have personally reviewed imaging studies:  Other Studies:  FL MODIFIED BARIUM SWALLOW W VIDEO   Final Result   Unremarkable modified barium swallow      Please see speech pathology report for further details. XR CHEST PORTABLE   Final Result   Right lung is well-expanded and clear. Infiltrate or consolidation   mid to lower left lung possibly atelectasis and/or pneumonia. Heart size   appears mildly enlarged but partially obscured due to the left lung opacity. No   pneumothorax. No significant right pleural effusion. Left pleural effusion not   excluded. Bilateral calcified breast implants are again noted. XR CHEST PORTABLE   Final Result   Increase in a large left effusion. This could obscure underlying pathology such    as pneumonia. There may be a small right midlung pneumonia, as well       René Redmond M.D.    3/5/2023 1:03:00 AM      XR CHEST PORTABLE   Final Result   Left lower lobe pneumonia         CT HEAD WO CONTRAST   Final Result      1. No acute cranial abnormality. 2.  Stable senescent changes, as above.          XR CHEST (2 VW)    (Results Pending)       Current Meds:  Current Facility-Administered Medications   Medication Dose Route Frequency    barium sulfate 40 % suspension 15 mL  15 mL Oral ONCE PRN    budesonide (PULMICORT) nebulizer suspension 500 mcg  0.5 mg Nebulization BID    albuterol (PROVENTIL) nebulizer solution 2.5 mg  2.5 mg Nebulization Q6H PRN    furosemide (LASIX) injection 20 mg  20 mg IntraVENous BID    verapamil (CALAN SR) extended release tablet 180 mg  180 mg Oral Nightly    [Held by provider] 0.9 % sodium chloride infusion   IntraVENous Continuous    sodium chloride flush 0.9 % injection 5-40 mL  5-40 mL IntraVENous 2 times per day    sodium chloride flush 0.9 % injection 5-40 mL  5-40 mL IntraVENous PRN    0.9 % sodium chloride infusion   IntraVENous PRN    famotidine (PEPCID) 20 mg in sodium chloride (PF) 0.9 % 10 mL injection  20 mg IntraVENous Daily    aluminum & magnesium hydroxide-simethicone (MAALOX) 200-200-20 MG/5ML suspension 30 mL  30 mL Oral Q6H PRN    cefTRIAXone (ROCEPHIN) 1,000 mg in sodium chloride 0.9 % 50 mL IVPB (mini-bag)  1,000 mg IntraVENous Q24H    vitamin B-12 (CYANOCOBALAMIN) tablet 1,000 mcg  1,000 mcg Oral Daily    donepezil (ARICEPT) tablet 10 mg  10 mg Oral Nightly    fluticasone (FLONASE) 50 MCG/ACT nasal spray 1 spray  1 spray Each Nostril Daily PRN    [Held by provider] lisinopril (PRINIVIL;ZESTRIL) tablet 20 mg  20 mg Oral Daily    QUEtiapine (SEROQUEL) tablet 25 mg  25 mg Oral Nightly    [Held by provider] rivaroxaban (XARELTO) tablet 20 mg  20 mg Oral Daily with breakfast    acetaminophen (TYLENOL) tablet 650 mg  650 mg Oral Q4H PRN    Or    acetaminophen (TYLENOL) suppository 650 mg  650 mg Rectal Q4H PRN    ondansetron (ZOFRAN-ODT) disintegrating tablet 4 mg  4 mg Oral Q8H PRN    Or    ondansetron (ZOFRAN) injection 4 mg  4 mg IntraVENous Q6H PRN    polyethylene glycol (GLYCOLAX) packet 17 g  17 g Oral Daily PRN    bisacodyl (DULCOLAX) suppository 10 mg  10 mg Rectal Daily PRN    atorvastatin (LIPITOR) tablet 80 mg  80 mg Oral Nightly    labetalol (NORMODYNE;TRANDATE) injection 10 mg  10 mg IntraVENous Q10 Min PRN    guaiFENesin (MUCINEX) extended release tablet 600 mg  600 mg Oral BID       Signed:  Katie Su DO    Part of this note may have been written by using a voice dictation software. The note has been proof read but may still contain some grammatical/other typographical errors.

## 2023-03-06 NOTE — PROGRESS NOTES
GOALS:  STG:  Patient will consume easy to chew textures and thin liquids without overt signs or symptoms of aspiration. Met 3/6/23  Patient will participate in modified barium swallow study as clinically indicated. Met 3/6/23  LTG: Patient will tolerate least restrictive diet without overt signs or symptoms of airway compromise by discharge. Met 3/6/23        SPEECH LANGUAGE PATHOLOGY: MODIFIED BARIUM SWALLOW STUDY Initial Assessment and Discharge    NAME: Diana Herrera  : 1940  MRN: [id number]    ADMISSION DATE: 3/2/2023  PRIMARY DIAGNOSIS: Sepsis ([facility]  Confusion [R41.0]  Generalized weakness [R53.1]  Sepsis ([facility] [facility] [A41.9]  Pneumonia of left lower lobe due to infectious organism [J18.9]  Cerebrovascular accident (CVA), unspecified mechanism ([facility] [facility] [I63.9]  Dementia without behavioral disturbance, psychotic disturbance, mood disturbance, or anxiety, unspecified dementia severity, unspecified dementia type ([facility] [F03.90]  Date of Eval: 3/6/2023    ICD-10: Treatment Diagnosis: R13.11 Dysphagia, Oral Phase    RECOMMENDATIONS   Diet:  Solid consistency: Easy to Chew  Liquid consistency: Thin                     Medications: As tolerated and Whole with thin liquids     Compensatory Swallowing Strategies : Remain upright for 30-45 minutes after meals, Swallow 2 times per bite/sip, Eat/Feed slowly, Small bites/sips      Interventions/Recommendations/Referrals      Patient continues to require skilled intervention: No  No follow up therapy recommended post discharge     ASSESSMENT    Patient presents with oropharyngeal swallow within functional limits. Timely swallow of thin liquids, pudding and mixed constency. Mild oral delay with cracker. Swallows of all textures with adequate hyolaryngeal elevation and excursion. No laryngeal penetration or aspiration observed and no pharyngeal residue after swallow. GENERAL          Behavior/Cognition: Alert; Cooperative;Pleasant mood       Pain:   Patient does not c/o pain          Dysphagia History                  Diet Solids Recommendation: Easy to Chew  and Liquid Consistency Recommendation: Thin            History of Present Injury/Illness: Ms. Jeannette Pandya  has a past medical history of Atrial fibrillation ([address] Dementia ([address] Depression, Hypercholesterolemia, Hypertension, and IBS (irritable bowel syndrome). . She also  has a past surgical history that includes Breast surgery; Breast biopsy (Left, 2014); orthopedic surgery (Left); other surgical history; hernia repair (2012); Breast surgery (2013); implant breast silicone/eq (Bilateral); Colonoscopy (06/2018); Colonoscopy (2012); and Upper gastrointestinal endoscopy (06/25/2018). OBJECTIVE    Modified barium swallow study was performed in the radiology suite using c-arm with Ms. Jeannette Pandya in the lateral plane. Radiologist: Dr. Abigail Tate  Fluoroscopy completed by: Dr. Abigail Tate    Oral Motor Assessment  Labial: No impairment  Lingual: No impairment  Dentition: Partials  Oral Hygiene: Adequate    PO trials  Patient was presented with trials of thin liquids (by spoon, cup sip, cup gulp, and straw sip), pudding, mixed consistency, and cracker. Oral phase:  no significant oral issues observed  prolonged bolus manipulation with cracker    Pharyngeal phase:  Swallows of thin liquids were timely. No laryngeal penetration or aspiration was observed. No pharyngeal residue after swallow. Swallows of pudding were triggered at valleculae and with pooling in valleculae for 3 seconds prior to swallow initiation. No laryngeal penetration or aspiration was observed. No pharyngeal residue after swallow. Swallows of mixed consistencies were triggered at valleculae and with pooling in valleculae for 12 seconds prior to swallow initiation. No laryngeal penetration or aspiration was observed. No pharyngeal residue after swallow.   Swallows of cracker were delayed, triggered at valleculae, and with pooling in valleculae for 3 seconds prior to swallow initiation. No laryngeal penetration or aspiration was observed. Residue was observed after the swallow in valleculae that was mild. Residue cleared with liquid rinse. Pharyngeal characteristics:  functional pharyngeal swallow  adequate retraction of base of tongue  adequate hyolaryngeal elevation/excursion  adequate epiglottic inversion  adequate constriction of posterior pharyngeal wall  adequate laryngeal closure  Aspiration/Penetration Scale:   1 (No penetration/aspiration. Contrast does not enter the airway.)    Upper Esophageal Screen:   a limited view. Therefore, unable to rule out an esophageal component of dysphagia  *Distal esophagus not assessed due to limitations of modified barium swallow study. PLAN    Duration/Frequency: No treatment indicated at this time    Dysphagia Outcome and Severity Scale (TIMO)  Dysphagia Outcome Severity Scale: Level 5: Mild dysphagia- Distant supervision. May need one diet consistency restricted  Interpretation of Tool: The Dysphagia Outcome and Severity Scale (TIMO) is a simple, easy-to-use, 7-point scale developed to systematically rate the functional severity of dysphagia based on objective assessment and make recommendations for diet level, independence level, and type of nutrition. Normal(7), Functional(6), Mild(5), Mild-Moderate(4), Moderate(3), Moderate-Severe(2), Severe(1)    Speech Therapy Prognosis  Prognosis: Good  Education:  Patient Education: MBS results/recommendations  Patient Education Response: Verbalizes understanding  Individuals consulted  Consulted and agree with results and recommendations: Patient;Physician    Current Medications:   No current facility-administered medications on file prior to encounter.      Current Outpatient Medications on File Prior to Encounter   Medication Sig Dispense Refill    atorvastatin (LIPITOR) 40 MG tablet TAKE 1 TABLET EVERY DAY 90 tablet 3    lisinopril (PRINIVIL;ZESTRIL) 20 MG tablet TAKE 1 TABLET EVERY DAY 90 tablet 3    Levocetirizine Dihydrochloride (XYZAL ALLERGY 24HR PO) Take by mouth daily      fluticasone (FLONASE) 50 MCG/ACT nasal spray 1 spray by Each Nostril route daily      Multiple Vitamins-Minerals (MULTIVITAMIN ADULTS PO) Take by mouth daily      verapamil (VERELAN) 240 MG extended release capsule Take 1 capsule by mouth nightly 1 every day 90 capsule 3    rivaroxaban (XARELTO) 20 MG TABS tablet Take 1 tablet by mouth daily (with breakfast) 90 tablet 3    donepezil (ARICEPT) 10 MG tablet Take 1 tablet by mouth nightly 90 tablet 3    QUEtiapine (SEROQUEL) 25 MG tablet Take 1 tablet by mouth at bedtime 90 tablet 1    cyanocobalamin 1000 MCG tablet Take 1,000 mcg by mouth daily         PRECAUTIONS/ALLERGIES: Patient has no known allergies.      SAFETY: Safety Devices in place: Yes      Therapy Time  SLP Individual Minutes  Time In: 1300  Time Out: 1  Minutes: [address], [address]  3/6/2023 1:37 PM

## 2023-03-06 NOTE — TELEPHONE ENCOUNTER
Kinsey Rice, daughter came be the office this morning and said that the pt has been admitted to the hospital. Kinsey Rice said that she talked to Dr Yaw Maki and he will complete her forms for her job.

## 2023-03-06 NOTE — FLOWSHEET NOTE
RRT Clinical Rounding Nurse Progress Report      SUBJECTIVE: Patient assessed secondary to RN/provider concern -  .    Vitals:    03/05/23 1925 03/05/23 2245 03/05/23 2329 03/06/23 0405   BP: (!) 143/79  (!) 92/52 (!) 97/56   Pulse: 94 82 82 69   Resp: 18 18 16 18   Temp: 98.1 °F (36.7 °C)  97.9 °F (36.6 °C) 98.6 °F (37 °C)   TempSrc: Oral  Oral Axillary   SpO2: 99% 94% 92% 92%   Weight:       Height:                ASSESSMENT:  Pt has had a good night. Still confused but very pleasant. Sat around 94% on 5 L NC. Primary RN with no concerns. We will discharge patient from Clinical Rounding list but we are available if needed. PLAN:  Will discharge from RRT Clinical Rounding Program per protocol. Please call if needed.     [address] [place] [address]: [facility]: [phone]

## 2023-03-06 NOTE — PROGRESS NOTES
Diana Herrera  Admission Date: 3/2/2023         Daily Progress Note: 3/6/2023    The patient's chart is reviewed and the patient is discussed with the staff. Background: 79 y/o female with fairly advanced dementia, high blood pressure, atrial fibrillation and came in this time around because of a pneumonia on the left side. Was having fever, white count up to 18,000. Since she has been here, has been on antibiotics and not requiring oxygen. Chest x-ray done today shows a moderate size pleural effusion versus worsening pneumonia. Oxygen needs been up to 12 L/min and are still elevated at this time. Patient fortunately did take her Xarelto this morning. She does have a cough but is not reporting significant shortness of breath. Looks a BMP is elevated at about 6.5 K and and currently being diuresed with Lasix. Does have electrolyte abnormalities and being adjusted and replaced at this time. Given worsening oxygen needs and pleural effusion asked to see if we can evaluate patient    Subjective: On 5L NC. Bps a little lower. No acute events overnight. Negative 720 recorded, but additional urine x 8 not measured after lasix. Eating breakfast. No chest pain or pleurisy. No fevers overnight. WBC better.      Current Facility-Administered Medications   Medication Dose Route Frequency    budesonide (PULMICORT) nebulizer suspension 500 mcg  0.5 mg Nebulization BID    albuterol (PROVENTIL) nebulizer solution 2.5 mg  2.5 mg Nebulization Q6H PRN    furosemide (LASIX) injection 20 mg  20 mg IntraVENous BID    verapamil (CALAN SR) extended release tablet 180 mg  180 mg Oral Nightly    [Held by provider] 0.9 % sodium chloride infusion   IntraVENous Continuous    sodium chloride flush 0.9 % injection 5-40 mL  5-40 mL IntraVENous 2 times per day    sodium chloride flush 0.9 % injection 5-40 mL  5-40 mL IntraVENous PRN    0.9 % sodium chloride infusion   IntraVENous PRN    famotidine (PEPCID) 20 mg in sodium chloride (PF) 0.9 % 10 mL injection  20 mg IntraVENous Daily    aluminum & magnesium hydroxide-simethicone (MAALOX) 200-200-20 MG/5ML suspension 30 mL  30 mL Oral Q6H PRN    cefTRIAXone (ROCEPHIN) 1,000 mg in sodium chloride 0.9 % 50 mL IVPB (mini-bag)  1,000 mg IntraVENous Q24H    vitamin B-12 (CYANOCOBALAMIN) tablet 1,000 mcg  1,000 mcg Oral Daily    donepezil (ARICEPT) tablet 10 mg  10 mg Oral Nightly    fluticasone (FLONASE) 50 MCG/ACT nasal spray 1 spray  1 spray Each Nostril Daily PRN    [Held by provider] lisinopril (PRINIVIL;ZESTRIL) tablet 20 mg  20 mg Oral Daily    QUEtiapine (SEROQUEL) tablet 25 mg  25 mg Oral Nightly    [Held by provider] rivaroxaban (XARELTO) tablet 20 mg  20 mg Oral Daily with breakfast    acetaminophen (TYLENOL) tablet 650 mg  650 mg Oral Q4H PRN    Or    acetaminophen (TYLENOL) suppository 650 mg  650 mg Rectal Q4H PRN    ondansetron (ZOFRAN-ODT) disintegrating tablet 4 mg  4 mg Oral Q8H PRN    Or    ondansetron (ZOFRAN) injection 4 mg  4 mg IntraVENous Q6H PRN    polyethylene glycol (GLYCOLAX) packet 17 g  17 g Oral Daily PRN    bisacodyl (DULCOLAX) suppository 10 mg  10 mg Rectal Daily PRN    atorvastatin (LIPITOR) tablet 80 mg  80 mg Oral Nightly    labetalol (NORMODYNE;TRANDATE) injection 10 mg  10 mg IntraVENous Q10 Min PRN    guaiFENesin (MUCINEX) extended release tablet 600 mg  600 mg Oral BID     Review of Systems: Comprehensive ROS negative except in HPI  Objective:   Blood pressure (!) 97/56, pulse 69, temperature 98.6 °F (37 °C), temperature source Axillary, resp. rate 18, height 4' 10\" (1.473 m), weight 98 lb (44.5 kg), SpO2 92 %.    Intake/Output Summary (Last 24 hours) at 3/6/2023 0732  Last data filed at 3/6/2023 0405  Gross per 24 hour   Intake 180 ml   Output 900 ml   Net -720 ml     Physical Exam:   Constitutional:  the patient is well developed and in no acute distress  EENMT:  Sclera clear, pupils equal, oral mucosa moist  Respiratory: symmetric chest rise. Diminished l base  Cardiovascular:  RRR without M,G,R. There is no lower extremity edema. Gastrointestinal: soft and non-tender; with positive bowel sounds. Musculoskeletal: warm without cyanosis. Normal muscle tone. Skin:  no jaundice or rashes, no wounds   Neurologic: symmetric strength, fluent speech  Psychiatric:  calm, appropriate, oriented x 4    Imaging: I performed an independent interpretation of the patient's images. CXR: increased L effusion/infiltrate      LAB:  Recent Labs     03/04/23  0728 03/05/23  0609 03/06/23  0450   WBC 14.0* 10.0 8.9   HGB 11.2* 10.1* 9.8*   HCT 33.5* 29.1* 28.5*    195 243   PROCAL  --  1.24*  --      Recent Labs     03/04/23  0728 03/05/23  0609 03/05/23  1505 03/06/23  0450    139 137 139   K 3.3* 2.9* 2.8* 4.1   * 110 105 110   CO2 21 24 25 25   BUN 10 8 7* 8   CREATININE 0.52* 0.48* 0.73 0.45*   MG 2.1 2.0  2.0  --  2.1   BILITOT 0.7 0.4  --   --    AST 26 28  --   --    ALT 22 25  --   --    ALKPHOS 113 101  --   --      Recent Labs     03/05/23  0609 03/06/23  0450   NTPROBNP 6,482* 3,914*     Recent Labs     03/05/23  0609 03/05/23  1505 03/06/23  0450   GLUCOSE 96 108* 103*      Microbiology:   No results for input(s): CULTURE in the last 72 hours. ECHO: No results found for this or any previous visit. Assessment and Plan:  (Medical Decision Making)   Principal Problem:    Sepsis ([address]  Plan: improved sepsis numbers, WBC, afebrile. Active Problems:    Left lower lobe pneumonia  Plan: On abx. Can follow up imaging after fluid removed. Dementia with behavioral disturbance  Plan: calm currently. Advanced and family is seeking placement for after admission    Acute hypoxemic respiratory failure ([address]  Plan: down to 5L. Looks comfortable breathing    Pleural effusion  Plan: moderate on ultrasound, no stranding.  Xarelto held x 1 day, without acute concerns for empyema would wait one more day and do thoracentesis tomorrow for holding Xarelto 4 doses. Discussed with patient's daughter and son. Essential hypertension  Plan: Bps a little lower today    Paroxysmal atrial fibrillation ([address]  Plan: Xarelto on hold. Ultrasound to assure has enough fluid to drain since we are holding this. It does look adequate and will plan on thoracentesis tomorrow, likely in afternoon. More than 50% of the time documented was spent in face-to-face contact with the patient and in the care of the patient on the floor/unit where the patient is located.     Zachary Esquivel MD

## 2023-03-06 NOTE — PROGRESS NOTES
ACUTE PHYSICAL THERAPY GOALS:   (Developed with and agreed upon by patient and/or caregiver. )    LTG:  (1.)Ms. Jerel Francis will move from supine to sit and sit to supine  in bed with SUPERVISION within 7 treatment day(s). (2.)Ms. Jerel Francis will transfer from bed to chair and chair to bed with SUPERVISION using the least restrictive device within 7 treatment day(s). (3.)Ms. Silva will ambulate with SUPERVISION for 250 feet with the least restrictive device within 7 treatment day(s). ________________________________________________________________________________________________     PHYSICAL THERAPY Daily Note and AM  (Link to Caseload Tracking: PT Visit Days : 4  Acknowledge Orders  Time In/Out  PT Charge Capture  Rehab Caseload Tracker    Hannah Kiran is a 80 y.o. female   PRIMARY DIAGNOSIS: Sepsis ([facility]  Confusion [R41.0]  Generalized weakness [R53.1]  Sepsis ([address] [A41.9]  Pneumonia of left lower lobe due to infectious organism [J18.9]  Cerebrovascular accident (CVA), unspecified mechanism ([address] [I63.9]  Dementia without behavioral disturbance, psychotic disturbance, mood disturbance, or anxiety, unspecified dementia severity, unspecified dementia type ([facility] [F03.90]       Reason for Referral: Generalized Muscle Weakness (M62.81)  Other abnormalities of gait and mobility (R26.89)  Inpatient: Payor: MEDICARE / Plan: MEDICARE PART A AND B / Product Type: *No Product type* /     ASSESSMENT:     REHAB RECOMMENDATIONS:   Recommendation to date pending progress:  Setting:  HHPT versus STR    Equipment:    Rolling Walker     ASSESSMENT:  Ms. Jerel Francis presents with somewhat limited functional mobility from her baseline. She apparently can transfer and ambulate without assist or without assistive device at baseline and has 24/7 sitters. She will benefit from PT to increase her independence with mobility and plans to return home with sitter/family support. She was supine on contact and agreeable to therapy. 3/6: Patient was supine upon contact. On 5L O2. Pleasant and agreeable to therapy. Slight confusion. Sitter present. Performed bed mobility with SBA and transfers with CGA. Ambulated 180' with HHA followed by toileting and hygiene. Needed verbal cues for safety and sequencing. Good improvement in gait distance. SpO2 remained at 95% or above during session. Family is requesting STR at discharge. [address] [facility] [address] [id number] AM-PAC 6 Clicks Basic Mobility Inpatient Short Form  AM-PAC Basic Mobility - Inpatient   How much help is needed turning from your back to your side while in a flat bed without using bedrails?: A Little  How much help is needed moving from lying on your back to sitting on the side of a flat bed without using bedrails?: A Little  How much help is needed moving to and from a bed to a chair?: A Little  How much help is needed standing up from a chair using your arms?: A Little  How much help is needed walking in hospital room?: A Little  How much help is needed climbing 3-5 steps with a railing?: A Little  [facility] Inpatient Mobility Raw Score : 18  AM-PAC Inpatient T-Scale Score : 43.63  Mobility Inpatient CMS 0-100% Score: 46.58  Mobility Inpatient CMS G-Code Modifier : CK    SUBJECTIVE:   Ms. Chivo Liu states, \"I'm having a little trouble waking up. \"     Social/Functional Lives With: Spouse  Type of Home: House  Home Layout: Two level, Able to Live on Main level with bedroom/bathroom  Home Access: Stairs to enter with rails  Entrance Stairs - Number of Steps: 3  Entrance Stairs - Rails: Both  Bathroom Shower/Tub: Walk-in shower  Bathroom Toilet: Standard  Receives Help From: Family  ADL Assistance: Needs assistance  Ambulation Assistance: Independent  Transfer Assistance: Independent    OBJECTIVE:     PAIN: Kerline Strafford / O2: Graves Stall / Suzzane Modest / DRAINS:   Pre Treatment:   Pain Assessment: None - Denies Pain      Post Treatment: 0 Vitals        Oxygen  O2 Device: Nasal cannula  O2 Flow Rate (L/min): 5 L/min   None    RESTRICTIONS/PRECAUTIONS:  Restrictions/Precautions: Fall Risk                 GROSS EVALUATION: Intact Impaired (Comments):   AROM [x]     PROM []    Strength [x]     Balance []   fair   Posture [] N/A   Sensation [x]     Coordination [x]      Tone [x]     Edema []    Activity Tolerance []  Limited by fatigue    []      COGNITION/  PERCEPTION: Intact Impaired (Comments):   Orientation [x]     Vision [x]     Hearing [x]     Cognition  [x]       MOBILITY: I Mod I S SBA CGA Min Mod Max Total  NT x2 Comments:   Bed Mobility    Rolling [] [] [] [] [] [] [] [] [] [] []    Supine to Sit [] [] [] [x] [] [] [] [] [] [] []    Scooting [] [] [] [x] [] [] [] [] [] [] []    Sit to Supine [] [] [] [x] [] [] [] [] [] [] []    Transfers    Sit to Stand [] [] [] [] [x] [] [] [] [] [] []    Bed to Chair [] [] [] [] [] [] [] [] [] [] []    Stand to Sit [] [] [] [] [x] [] [] [] [] [] []     [] [] [] [] [] [] [] [] [] [] []    I=Independent, Mod I=Modified Independent, S=Supervision, SBA=Standby Assistance, CGA=Contact Guard Assistance,   Min=Minimal Assistance, Mod=Moderate Assistance, Max=Maximal Assistance, Total=Total Assistance, NT=Not Tested    GAIT: I Mod I S SBA CGA Min Mod Max Total  NT x2 Comments:   Level of Assistance [] [] [] [] [] [x] [] [] [] []     Distance 180'    DME HHA    Gait Quality Decreased step length, decreased dilip, decreased step clearance, and path deviations    Weightbearing Status Fall risk    Stairs      I=Independent, Mod I=Modified Independent, S=Supervision, SBA=Standby Assistance, CGA=Contact Guard Assistance,   Min=Minimal Assistance, Mod=Moderate Assistance, Max=Maximal Assistance, Total=Total Assistance, NT=Not Tested    PLAN:   FREQUENCY AND DURATION: Daily for duration of hospital stay or until stated goals are met, whichever comes first.    THERAPY PROGNOSIS: Good    PROBLEM LIST:   (Skilled intervention is medically necessary to address:)  Decreased ADL/Functional Activities  Decreased Activity Tolerance  Decreased Balance  Decreased Gait Ability  Decreased Safety Awareness  Decreased Strength  Decreased Transfer Abilities INTERVENTIONS PLANNED:   (Benefits and precautions of physical therapy have been discussed with the patient.)  Therapeutic Activity  Therapeutic Exercise/HEP  Gait Training  Manual Therapy  Education       TREATMENT:       TREATMENT:   Therapeutic Activity (28 Minutes): Therapeutic activity included Rolling, Supine to Sit, Sit to Supine, Scooting, Transfer Training, Ambulation on level ground, Sitting balance , and Standing balance to improve functional Activity tolerance, Balance, Coordination, Mobility, and Strength. TREATMENT GRID:  N/A    AFTER TREATMENT PRECAUTIONS: Bed, Bed/Chair Locked, Call light within reach, Needs within reach, Side rails x2, and sitter at bedside .      INTERDISCIPLINARY COLLABORATION:  RN/ PCT, PT/ PTA, and OT/ [place]    EDUCATION:      TIME IN/OUT:  Time In: 1000  Time Out: [address] [address] [facility]  Minutes: [address], PT

## 2023-03-07 ENCOUNTER — APPOINTMENT (OUTPATIENT)
Dept: GENERAL RADIOLOGY | Age: 83
DRG: 871 | End: 2023-03-07
Payer: MEDICARE

## 2023-03-07 PROBLEM — A41.9 SEPSIS (HCC): Status: RESOLVED | Noted: 2023-03-02 | Resolved: 2023-03-07

## 2023-03-07 LAB
ANION GAP SERPL CALC-SCNC: 6 MMOL/L (ref 2–11)
APPEARANCE FLD: NORMAL
BACTERIA SPEC CULT: NORMAL
BACTERIA SPEC CULT: NORMAL
BASOPHILS # BLD: 0 K/UL (ref 0–0.2)
BASOPHILS NFR BLD: 0 % (ref 0–2)
BODY FLD TYPE: NORMAL
BUN SERPL-MCNC: 13 MG/DL (ref 8–23)
CALCIUM SERPL-MCNC: 8.8 MG/DL (ref 8.3–10.4)
CHLORIDE SERPL-SCNC: 105 MMOL/L (ref 101–110)
CO2 SERPL-SCNC: 26 MMOL/L (ref 21–32)
COLOR FLD: NORMAL
CREAT SERPL-MCNC: 0.41 MG/DL (ref 0.6–1)
DIFFERENTIAL METHOD BLD: ABNORMAL
EOSINOPHIL # BLD: 0.2 K/UL (ref 0–0.8)
EOSINOPHIL NFR BLD: 2 % (ref 0.5–7.8)
ERYTHROCYTE [DISTWIDTH] IN BLOOD BY AUTOMATED COUNT: 12.9 % (ref 11.9–14.6)
GLUCOSE FLD-MCNC: 96 MG/DL
GLUCOSE SERPL-MCNC: 97 MG/DL (ref 65–100)
HCT VFR BLD AUTO: 30.2 % (ref 35.8–46.3)
HGB BLD-MCNC: 10.3 G/DL (ref 11.7–15.4)
IMM GRANULOCYTES # BLD AUTO: 0.1 K/UL (ref 0–0.5)
IMM GRANULOCYTES NFR BLD AUTO: 1 % (ref 0–5)
LDH FLD L TO P-CCNC: 178 U/L
LDH SERPL L TO P-CCNC: 271 U/L (ref 110–210)
LYMPHOCYTES # BLD: 1.5 K/UL (ref 0.5–4.6)
LYMPHOCYTES NFR BLD: 16 % (ref 13–44)
LYMPHOCYTES NFR BRONCH MANUAL: 9 %
MAGNESIUM SERPL-MCNC: 2.2 MG/DL (ref 1.8–2.4)
MCH RBC QN AUTO: 32.1 PG (ref 26.1–32.9)
MCHC RBC AUTO-ENTMCNC: 34.1 G/DL (ref 31.4–35)
MCV RBC AUTO: 94.1 FL (ref 82–102)
MM INDURATION, POC: 0 MM (ref 0–5)
MONOCYTES # BLD: 1 K/UL (ref 0.1–1.3)
MONOCYTES NFR BLD: 11 % (ref 4–12)
NEUTROPHILS NFR BRONCH MANUAL: 91 %
NEUTS SEG # BLD: 6.3 K/UL (ref 1.7–8.2)
NEUTS SEG NFR BLD: 70 % (ref 43–78)
NRBC # BLD: 0 K/UL (ref 0–0.2)
NUC CELL # FLD: 1116 /CU MM
PH FLD: 7
PLATELET # BLD AUTO: 279 K/UL (ref 150–450)
PMV BLD AUTO: 10.7 FL (ref 9.4–12.3)
POTASSIUM SERPL-SCNC: 3.3 MMOL/L (ref 3.5–5.1)
PPD, POC: NEGATIVE
PROT FLD-MCNC: 3 G/DL
PROT SERPL-MCNC: 5.5 G/DL (ref 6.3–8.2)
RBC # BLD AUTO: 3.21 M/UL (ref 4.05–5.2)
RBC # FLD: NORMAL /CU MM
SERVICE CMNT-IMP: NORMAL
SERVICE CMNT-IMP: NORMAL
SODIUM SERPL-SCNC: 137 MMOL/L (ref 133–143)
SPECIMEN SOURCE FLD: NORMAL
WBC # BLD AUTO: 9 K/UL (ref 4.3–11.1)

## 2023-03-07 PROCEDURE — 32555 ASPIRATE PLEURA W/ IMAGING: CPT | Performed by: INTERNAL MEDICINE

## 2023-03-07 PROCEDURE — 85025 COMPLETE CBC W/AUTO DIFF WBC: CPT

## 2023-03-07 PROCEDURE — 6370000000 HC RX 637 (ALT 250 FOR IP): Performed by: INTERNAL MEDICINE

## 2023-03-07 PROCEDURE — 6370000000 HC RX 637 (ALT 250 FOR IP): Performed by: FAMILY MEDICINE

## 2023-03-07 PROCEDURE — 2500000003 HC RX 250 WO HCPCS: Performed by: FAMILY MEDICINE

## 2023-03-07 PROCEDURE — 0W9B3ZZ DRAINAGE OF LEFT PLEURAL CAVITY, PERCUTANEOUS APPROACH: ICD-10-PCS | Performed by: INTERNAL MEDICINE

## 2023-03-07 PROCEDURE — 83735 ASSAY OF MAGNESIUM: CPT

## 2023-03-07 PROCEDURE — 36415 COLL VENOUS BLD VENIPUNCTURE: CPT

## 2023-03-07 PROCEDURE — 84157 ASSAY OF PROTEIN OTHER: CPT

## 2023-03-07 PROCEDURE — 83986 ASSAY PH BODY FLUID NOS: CPT

## 2023-03-07 PROCEDURE — 6360000002 HC RX W HCPCS: Performed by: INTERNAL MEDICINE

## 2023-03-07 PROCEDURE — 87070 CULTURE OTHR SPECIMN AEROBIC: CPT

## 2023-03-07 PROCEDURE — 1100000000 HC RM PRIVATE

## 2023-03-07 PROCEDURE — 94761 N-INVAS EAR/PLS OXIMETRY MLT: CPT

## 2023-03-07 PROCEDURE — 2700000000 HC OXYGEN THERAPY PER DAY

## 2023-03-07 PROCEDURE — 89050 BODY FLUID CELL COUNT: CPT

## 2023-03-07 PROCEDURE — 82945 GLUCOSE OTHER FLUID: CPT

## 2023-03-07 PROCEDURE — 80048 BASIC METABOLIC PNL TOTAL CA: CPT

## 2023-03-07 PROCEDURE — 2580000003 HC RX 258: Performed by: INTERNAL MEDICINE

## 2023-03-07 PROCEDURE — 99232 SBSQ HOSP IP/OBS MODERATE 35: CPT | Performed by: INTERNAL MEDICINE

## 2023-03-07 PROCEDURE — 76604 US EXAM CHEST: CPT | Performed by: INTERNAL MEDICINE

## 2023-03-07 PROCEDURE — 94640 AIRWAY INHALATION TREATMENT: CPT

## 2023-03-07 PROCEDURE — A4216 STERILE WATER/SALINE, 10 ML: HCPCS | Performed by: INTERNAL MEDICINE

## 2023-03-07 PROCEDURE — 84155 ASSAY OF PROTEIN SERUM: CPT

## 2023-03-07 PROCEDURE — 6360000002 HC RX W HCPCS: Performed by: FAMILY MEDICINE

## 2023-03-07 PROCEDURE — 2500000003 HC RX 250 WO HCPCS: Performed by: INTERNAL MEDICINE

## 2023-03-07 PROCEDURE — 97530 THERAPEUTIC ACTIVITIES: CPT

## 2023-03-07 PROCEDURE — 71046 X-RAY EXAM CHEST 2 VIEWS: CPT

## 2023-03-07 PROCEDURE — 94760 N-INVAS EAR/PLS OXIMETRY 1: CPT

## 2023-03-07 PROCEDURE — 83615 LACTATE (LD) (LDH) ENZYME: CPT

## 2023-03-07 RX ORDER — MAGNESIUM SULFATE IN WATER 40 MG/ML
2000 INJECTION, SOLUTION INTRAVENOUS PRN
Status: DISCONTINUED | OUTPATIENT
Start: 2023-03-07 | End: 2023-03-09 | Stop reason: HOSPADM

## 2023-03-07 RX ORDER — BUMETANIDE 0.25 MG/ML
1 INJECTION, SOLUTION INTRAMUSCULAR; INTRAVENOUS 2 TIMES DAILY
Status: COMPLETED | OUTPATIENT
Start: 2023-03-07 | End: 2023-03-09

## 2023-03-07 RX ORDER — POTASSIUM CHLORIDE 7.45 MG/ML
10 INJECTION INTRAVENOUS PRN
Status: DISCONTINUED | OUTPATIENT
Start: 2023-03-07 | End: 2023-03-09 | Stop reason: HOSPADM

## 2023-03-07 RX ORDER — POTASSIUM CHLORIDE 20 MEQ/1
40 TABLET, EXTENDED RELEASE ORAL PRN
Status: DISCONTINUED | OUTPATIENT
Start: 2023-03-07 | End: 2023-03-09 | Stop reason: HOSPADM

## 2023-03-07 RX ADMIN — CYANOCOBALAMIN TAB 1000 MCG 1000 MCG: 1000 TAB at 08:38

## 2023-03-07 RX ADMIN — DONEPEZIL HYDROCHLORIDE 10 MG: 5 TABLET ORAL at 20:55

## 2023-03-07 RX ADMIN — QUETIAPINE FUMARATE 25 MG: 25 TABLET ORAL at 20:54

## 2023-03-07 RX ADMIN — SODIUM CHLORIDE, PRESERVATIVE FREE 10 ML: 5 INJECTION INTRAVENOUS at 21:17

## 2023-03-07 RX ADMIN — GUAIFENESIN 600 MG: 600 TABLET, EXTENDED RELEASE ORAL at 08:38

## 2023-03-07 RX ADMIN — FUROSEMIDE 20 MG: 10 INJECTION, SOLUTION INTRAMUSCULAR; INTRAVENOUS at 08:38

## 2023-03-07 RX ADMIN — BUMETANIDE 1 MG: 0.25 INJECTION INTRAMUSCULAR; INTRAVENOUS at 20:52

## 2023-03-07 RX ADMIN — BUDESONIDE 500 MCG: 0.5 INHALANT RESPIRATORY (INHALATION) at 20:02

## 2023-03-07 RX ADMIN — FAMOTIDINE 20 MG: 10 INJECTION, SOLUTION INTRAVENOUS at 08:38

## 2023-03-07 RX ADMIN — VERAPAMIL HYDROCHLORIDE 180 MG: 180 TABLET, FILM COATED, EXTENDED RELEASE ORAL at 20:54

## 2023-03-07 RX ADMIN — POTASSIUM BICARBONATE 40 MEQ: 782 TABLET, EFFERVESCENT ORAL at 09:43

## 2023-03-07 RX ADMIN — ATORVASTATIN CALCIUM 80 MG: 40 TABLET, FILM COATED ORAL at 20:54

## 2023-03-07 RX ADMIN — CEFTRIAXONE 1000 MG: 1 INJECTION, POWDER, FOR SOLUTION INTRAMUSCULAR; INTRAVENOUS at 17:24

## 2023-03-07 NOTE — PROGRESS NOTES
SPEECH LANGUAGE PATHOLOGY NOTE    Orders received for cognitive evaluation. Recommend considering psych or neuro input as this is beyond SLP scope given history of dementia and acute medical issues.        [address] CCC-SLP

## 2023-03-07 NOTE — PROCEDURES
PROCEDURE:  DIAGNOSTIC THORACENTESIS, THERAPEUTIC THORACENTESIS     PRE-OP DIAGNOSIS:  Left PLEURAL EFFUSION    POST-OP DIAGNOSIS:  Left PLEURAL EFFUSION    VOLUME REMOVED:  400 cc    ANESTHESIA:  LOCAL ANESTHESIA WITH 1% LIDOCAINE 10 CC TOTAL. CHEST ULTRASOUND FINDINGS:    A Turbo-M, Sonosite ultrasound with a 5-16 mHz probe was used to image the chest and localize the pleural effusion on the left chest.    A small anechoic space was seen on the left consistent with an uncomplicated pleural effusion. DESCRIPTION OF PROCEDURE:    After obtaining informed consent and localizing the safest location for thoracentesis, the  9th intercostal space was marked with a blunt, plastic needle cap in the mid scapular line. An [email or web address] AK-0100 Pleral-Seal thoracentesis kit was used to perform the procedure. The skin was cleansed with the supplied chlorhexidine swab and then draped in the usual fashion. Using the previously marked location as a guide, a 22 G 1.5 inch needle was used to inject 1% lidocaine into the skin and subcutaneous tissue, as well as onto the underlying rib and inter-costal muscles. Pleural fluid was aspirated to assure proper location and additional lidocaine was injected into the pleural space prior to removing the anesthesia needle. A 3mm incision was then made with the supplied scalpel in the usual fashion to facilitate the insertion of the thoracentesis needle. The needle with an 8 Romanian thoracentesis catheter was then introduced into the chest through the previously made incision in the usual fashion, the rib localized with the needle, and the catheter then marched over the rib into the pleural space. After aspirating fluid, the thoracentesis catheter was then placed into the chest using the needle itself as a trocar. The needle was then removed and the catheter was attached to the supplied tubing without complication.     400 cc of red tinged fluid fluid was aspirated and sent for analysis. Fluid was sent for the following tests:    LDH  Total Protein  Glucose  Cell count with differential  Routine culture and Gram stain  Cytology    Post procedure US confirmed complete drainage of the effusion and presence of lung sliding, ruling out pneumothorax.  (22513)    EBL:  <9LX    COMPLICATIONS:  none    Otoniel La MD

## 2023-03-07 NOTE — INTERVAL H&P NOTE
Update History & Physical    The patient's History and Physical of March 5, Thoracentesis was reviewed with the patient and I examined the patient. There was no change. The surgical site was confirmed by the patient and me. Plan: The risks, benefits, expected outcome, and alternative to the recommended procedure have been discussed with the patient. Patient understands and wants to proceed with the procedure.      Electronically signed by Supa Jarvis MD on 3/7/2023 at 2:56 PM

## 2023-03-07 NOTE — PROGRESS NOTES
Aracelis Molina  Admission Date: 3/2/2023         Daily Progress Note: 3/7/2023    The patient's chart is reviewed and the patient is discussed with the staff. Background: 79 y/o female with fairly advanced dementia, high blood pressure, atrial fibrillation and came in this time around because of a pneumonia on the left side. Was having fever, white count up to 18,000. Since she has been here, has been on antibiotics and not requiring oxygen. Chest x-ray done today shows a moderate size pleural effusion versus worsening pneumonia. Oxygen needs been up to 12 L/min and are still elevated at this time. Patient fortunately did take her Xarelto this morning. She does have a cough but is not reporting significant shortness of breath. Looks a BMP is elevated at about 6.5 K and and currently being diuresed with Lasix. Does have electrolyte abnormalities and being adjusted and replaced at this time. Given worsening oxygen needs and pleural effusion asked to see if we can evaluate patient    Subjective:     On 2L NC. Feels better. Went outside today with daughter.      Current Facility-Administered Medications   Medication Dose Route Frequency    potassium chloride (KLOR-CON M) extended release tablet 40 mEq  40 mEq Oral PRN    Or    potassium bicarb-citric acid (EFFER-K) effervescent tablet 40 mEq  40 mEq Oral PRN    Or    potassium chloride 10 mEq/100 mL IVPB (Peripheral Line)  10 mEq IntraVENous PRN    magnesium sulfate 2000 mg in 50 mL IVPB premix  2,000 mg IntraVENous PRN    sodium phosphate 10 mmol in sodium chloride 0.9 % 250 mL IVPB  10 mmol IntraVENous PRN    Or    sodium phosphate 15 mmol in sodium chloride 0.9 % 250 mL IVPB  15 mmol IntraVENous PRN    Or    sodium phosphate 20 mmol in sodium chloride 0.9 % 500 mL IVPB  20 mmol IntraVENous PRN    bumetanide (BUMEX) injection 1 mg  1 mg IntraVENous BID    barium sulfate 40 % suspension 15 mL  15 mL Oral ONCE PRN    tuberculin injection 5 Units  5 Units IntraDERmal Once    budesonide (PULMICORT) nebulizer suspension 500 mcg  0.5 mg Nebulization BID    albuterol (PROVENTIL) nebulizer solution 2.5 mg  2.5 mg Nebulization Q6H PRN    verapamil (CALAN SR) extended release tablet 180 mg  180 mg Oral Nightly    [Held by provider] 0.9 % sodium chloride infusion   IntraVENous Continuous    sodium chloride flush 0.9 % injection 5-40 mL  5-40 mL IntraVENous 2 times per day    sodium chloride flush 0.9 % injection 5-40 mL  5-40 mL IntraVENous PRN    0.9 % sodium chloride infusion   IntraVENous PRN    famotidine (PEPCID) 20 mg in sodium chloride (PF) 0.9 % 10 mL injection  20 mg IntraVENous Daily    aluminum & magnesium hydroxide-simethicone (MAALOX) 200-200-20 MG/5ML suspension 30 mL  30 mL Oral Q6H PRN    cefTRIAXone (ROCEPHIN) 1,000 mg in sodium chloride 0.9 % 50 mL IVPB (mini-bag)  1,000 mg IntraVENous Q24H    vitamin B-12 (CYANOCOBALAMIN) tablet 1,000 mcg  1,000 mcg Oral Daily    donepezil (ARICEPT) tablet 10 mg  10 mg Oral Nightly    fluticasone (FLONASE) 50 MCG/ACT nasal spray 1 spray  1 spray Each Nostril Daily PRN    [Held by provider] lisinopril (PRINIVIL;ZESTRIL) tablet 20 mg  20 mg Oral Daily    QUEtiapine (SEROQUEL) tablet 25 mg  25 mg Oral Nightly    [Held by provider] rivaroxaban (XARELTO) tablet 20 mg  20 mg Oral Daily with breakfast    acetaminophen (TYLENOL) tablet 650 mg  650 mg Oral Q4H PRN    Or    acetaminophen (TYLENOL) suppository 650 mg  650 mg Rectal Q4H PRN    ondansetron (ZOFRAN-ODT) disintegrating tablet 4 mg  4 mg Oral Q8H PRN    Or    ondansetron (ZOFRAN) injection 4 mg  4 mg IntraVENous Q6H PRN    polyethylene glycol (GLYCOLAX) packet 17 g  17 g Oral Daily PRN    bisacodyl (DULCOLAX) suppository 10 mg  10 mg Rectal Daily PRN    atorvastatin (LIPITOR) tablet 80 mg  80 mg Oral Nightly    labetalol (NORMODYNE;TRANDATE) injection 10 mg  10 mg IntraVENous Q10 Min PRN     Review of Systems: Comprehensive ROS negative except in HPI  Objective:   Blood pressure 137/88, pulse 70, temperature 98.8 °F (37.1 °C), temperature source Oral, resp. rate 18, height 4' 10\" (1.473 m), weight 98 lb (44.5 kg), SpO2 98 %. No intake or output data in the 24 hours ending 03/07/23 1519    Physical Exam:   Constitutional:  the patient is well developed and in no acute distress  EENMT:  Sclera clear, pupils equal, oral mucosa moist  Respiratory: symmetric chest rise. Diminished l base  Cardiovascular:  RRR without M,G,R. There is no lower extremity edema. Gastrointestinal: soft and non-tender; with positive bowel sounds. Musculoskeletal: warm without cyanosis. Normal muscle tone. Skin:  no jaundice or rashes, no wounds   Neurologic: symmetric strength, fluent speech  Psychiatric:  calm, appropriate, oriented x 4    Imaging: I performed an independent interpretation of the patient's images. CXR: increased L effusion/infiltrate      LAB:  Recent Labs     03/05/23  0609 03/06/23 0450 03/07/23 0455   WBC 10.0 8.9 9.0   HGB 10.1* 9.8* 10.3*   HCT 29.1* 28.5* 30.2*    243 279   PROCAL 1.24*  --   --        Recent Labs     03/05/23  0609 03/05/23  1505 03/06/23 0450 03/07/23 0455    137 139 137   K 2.9* 2.8* 4.1 3.3*    105 110 105   CO2 24 25 25 26   BUN 8 7* 8 13   CREATININE 0.48* 0.73 0.45* 0.41*   MG 2.0  2.0  --  2.1 2.2   BILITOT 0.4  --   --   --    AST 28  --   --   --    ALT 25  --   --   --    ALKPHOS 101  --   --   --        Recent Labs     03/05/23  0609 03/06/23 0450   NTPROBNP 6,482* 3,914*       Recent Labs     03/05/23  1505 03/06/23 0450 03/07/23 0455   GLUCOSE 108* 103* 97        Microbiology:   No results for input(s): CULTURE in the last 72 hours. ECHO: No results found for this or any previous visit. Assessment and Plan:  (Medical Decision Making)   Principal Problem:    Sepsis ([facility] [address]  Plan: improved sepsis numbers, WBC, afebrile. Active Problems:    Left lower lobe pneumonia  Plan: On abx.  Can follow up imaging after fluid removed. Dementia with behavioral disturbance  Plan: calm currently. Advanced and family is seeking placement for after admission    Acute hypoxemic respiratory failure ([address]  Plan: down to 5L. Looks comfortable breathing    Pleural effusion  Plan: moderate on ultrasound, no stranding. Xarelto held x 2 days. Thoracentesis performed with 400cc completely resolved. Can restart Xarelto in AM.     Essential hypertension  Plan: Bps a little lower today    Paroxysmal atrial fibrillation (HCC)  Plan: Xarelto, restart in AM.     We will sign off. Call with questions. Looks improved and likely to SNF/rehab soon. D/C continuous pulse oximetry. More than 50% of the time documented was spent in face-to-face contact with the patient and in the care of the patient on the floor/unit where the patient is located.     Nelly Hernandes MD

## 2023-03-07 NOTE — PROGRESS NOTES
Hospitalist Progress Note   Admit Date:  3/2/2023  4:09 PM   Name:  Alecia Hernandez   Age:  80 y.o. Sex:  female  :  1940   MRN:  [id number]   Room:  [facility]/    Presenting Complaint: Facial Droop     Reason(s) for Admission: Confusion [R41.0]  Generalized weakness [R53.1]  Sepsis ([facility] [address] [A41.9]  Pneumonia of left lower lobe due to infectious organism [J18.9]  Cerebrovascular accident (CVA), unspecified mechanism ([address] [I63.9]  Dementia without behavioral disturbance, psychotic disturbance, mood disturbance, or anxiety, unspecified dementia severity, unspecified dementia type ([facility] [place] [F03.90]     Hospital Course:   82F PMHx paroxysmal atrial fibrillation on Xarelto and verapamil rate control, hypertension and dementia with behavioral abnormalities on Seroquel nocturnally. She had been visiting her  in the hospital with a hernia recently and had fatigue. Family reported that patient was difficult to arouse, weak and could not stand. Noticed increased temp complained of left shoulder plain and family felt possible left-sided weakness and possible left facial droop. Droop not felt to be significant by ER or neurology and initial work-up for code stroke negative. Patient found to have sepsis with fever leukocytosis and a new left lower lobe pneumonia. Neurology felt symptoms likely related to her baseline dementia with acute infectious process. Patient after receiving IV fluids somewhat more alert conversant but alert to person only. Pleasant and cooperative and maintains social graces. Discussed with daughter and son at bedside and patient to be admitted and will honor DO NOT RESUSCITATE status. Patient has had outpatient palliative care consultations.   Patient does have behavioral abnormality with her dementia with nocturnal confusion and will continue her nocturnal Seroquel but family expects she could have aggravation of behavioral abnormalities with change in environment during hospitalization.  Discussed planned antibiotics supportive care and continued home meds including anticoagulation and rate control regarding atrial fibrillation.  No dysuria.  Serum creatinine okay.  Lactic acid pending.  A-fib apparently paroxysmal and EKG was sinus mechanism noted.    Subjective & 24hr Events (03/07/23):   Examining impulsive and paranoid behaviors.   also currently hospitalized.  Case management working on dual placement.  Worked with PT, OT.  Continues to have derangement of electrolytes. Plan discussed with nurse and .    Assessment & Plan:   Sepsis  Resolved    Left lower lobe pneumonia  -Ceftriaxone    Dementia with behavioral disturbance  -SLP cognitive eval  -Donepezil, quetiapine    Dysphagia  -SLP    Acute hypoxemic respiratory failure  -Maintain O2 sat greater than 92  -Pulmonary hygiene  -Bumetanide  -Budesonide    Pleural effusion  -Bumetanide    Essential hypertension  -Hold lisinopril    Paroxysmal atrial fibrillation  -Hold rivaroxaban  -Verapamil      PT/OT evals and PPD needed/ordered?  Yes  Diet:  ADULT DIET; Easy to Chew  VTE prophylaxis: SCD's   Code status: DNR    Hospital Problems:  Principal Problem:    Sepsis (HCC)  Active Problems:    Left lower lobe pneumonia    Dementia with behavioral disturbance    Dysphagia    Acute hypoxemic respiratory failure (HCC)    Pleural effusion    Acute diastolic (congestive) heart failure (HCC)    Essential hypertension    Paroxysmal atrial fibrillation (HCC)  Resolved Problems:    * No resolved hospital problems. *      Objective:   Patient Vitals for the past 24 hrs:   Temp Pulse Resp BP SpO2   03/07/23 0723 99.1 °F (37.3 °C) 63 16 123/69 96 %   03/07/23 0431 98.2 °F (36.8 °C) 65 16 103/62 97 %   03/07/23 0016 98.2 °F (36.8 °C) 66 17 (!) 99/57 98 %   03/06/23 2133 -- 95 18 -- 95 %   03/06/23 2016 99.3 °F (37.4 °C) 91 16 135/83 97 %   03/06/23 1657 -- 78 -- -- 100 %   03/06/23 1640 98.2 °F (36.8 °C) 79 16 138/81 100 %  03/06/23 1132 98.8 °F (37.1 °C) 68 17 126/63 100 %   03/06/23 1020 -- -- -- -- 97 %       Oxygen Therapy  SpO2: 96 %  Pulse Oximeter Device Mode: Continuous  Pulse Oximeter Device Location: Right, Finger (new probe placed on R finger)  O2 Device: Nasal cannula  O2 Flow Rate (L/min): 3 L/min    Estimated body mass index is 20.48 kg/m² as calculated from the following:    Height as of this encounter: 4' 10\" (1.473 m). Weight as of this encounter: 98 lb (44.5 kg). No intake or output data in the 24 hours ending 03/07/23 0923      Blood pressure 123/69, pulse 63, temperature 99.1 °F (37.3 °C), temperature source Oral, resp. rate 16, height 4' 10\" (1.473 m), weight 98 lb (44.5 kg), SpO2 96 %. Physical Exam  Vitals and nursing note reviewed. Constitutional:       General: She is not in acute distress. Appearance: She is ill-appearing. She is not diaphoretic. Eyes:      Extraocular Movements: Extraocular movements intact. Cardiovascular:      Rate and Rhythm: Normal rate. Rhythm irregular. Pulmonary:      Effort: Pulmonary effort is normal. No respiratory distress. Breath sounds: No wheezing or rhonchi. Abdominal:      General: There is no distension. Musculoskeletal:         General: No deformity. Skin:     Coloration: Skin is not jaundiced or pale. Neurological:      General: No focal deficit present. Mental Status: She is alert. Psychiatric:         Mood and Affect: Affect normal. Mood is depressed. Behavior: Behavior is slowed. Cognition and Memory: Cognition is impaired.          I have personally reviewed labs and tests:  Recent Labs:  Recent Results (from the past 48 hour(s))   Basic Metabolic Panel    Collection Time: 03/05/23  3:05 PM   Result Value Ref Range    Sodium 137 133 - 143 mmol/L    Potassium 2.8 (L) 3.5 - 5.1 mmol/L    Chloride 105 101 - 110 mmol/L    CO2 25 21 - 32 mmol/L    Anion Gap 7 2 - 11 mmol/L    Glucose 108 (H) 65 - 100 mg/dL    BUN 7 (L) 8 - 23 MG/DL    Creatinine 0.73 0.6 - 1.0 MG/DL    Est, Glom Filt Rate >60 >60 ml/min/1.73m2    Calcium 8.8 8.3 - 10.4 MG/DL   CBC with Auto Differential    Collection Time: 03/06/23  4:50 AM   Result Value Ref Range    WBC 8.9 4.3 - 11.1 K/uL    RBC 3.04 (L) 4.05 - 5.2 M/uL    Hemoglobin 9.8 (L) 11.7 - 15.4 g/dL    Hematocrit 28.5 (L) 35.8 - 46.3 %    MCV 93.8 82.0 - 102.0 FL    MCH 32.2 26.1 - 32.9 PG    MCHC 34.4 31.4 - 35.0 g/dL    RDW 13.0 11.9 - 14.6 %    Platelets 787 841 - 510 K/uL    MPV 11.1 9.4 - 12.3 FL    nRBC 0.00 0.0 - 0.2 K/uL    Differential Type AUTOMATED      Seg Neutrophils 73 43 - 78 %    Lymphocytes 15 13 - 44 %    Monocytes 11 4.0 - 12.0 %    Eosinophils % 1 0.5 - 7.8 %    Basophils 0 0.0 - 2.0 %    Immature Granulocytes 1 0.0 - 5.0 %    Segs Absolute 6.5 1.7 - 8.2 K/UL    Absolute Lymph # 1.3 0.5 - 4.6 K/UL    Absolute Mono # 1.0 0.1 - 1.3 K/UL    Absolute Eos # 0.1 0.0 - 0.8 K/UL    Basophils Absolute 0.0 0.0 - 0.2 K/UL    Absolute Immature Granulocyte 0.1 0.0 - 0.5 K/UL   Brain Natriuretic Peptide    Collection Time: 03/06/23  4:50 AM   Result Value Ref Range    NT Pro-BNP 3,914 (H) <450 PG/ML   Basic Metabolic Panel w/ Reflex to MG    Collection Time: 03/06/23  4:50 AM   Result Value Ref Range    Sodium 139 133 - 143 mmol/L    Potassium 4.1 3.5 - 5.1 mmol/L    Chloride 110 101 - 110 mmol/L    CO2 25 21 - 32 mmol/L    Anion Gap 4 2 - 11 mmol/L    Glucose 103 (H) 65 - 100 mg/dL    BUN 8 8 - 23 MG/DL    Creatinine 0.45 (L) 0.6 - 1.0 MG/DL    Est, Glom Filt Rate >60 >60 ml/min/1.73m2    Calcium 8.2 (L) 8.3 - 10.4 MG/DL   Magnesium    Collection Time: 03/06/23  4:50 AM   Result Value Ref Range    Magnesium 2.1 1.8 - 2.4 mg/dL   Transthoracic echocardiogram (TTE) complete with contrast, bubble, strain, and 3D PRN    Collection Time: 03/06/23  9:50 AM   Result Value Ref Range    LV EDV A2C 31 mL    LV EDV A4C 43 mL    LV ESV A2C 12 mL    LV ESV A4C 16 mL    IVSd 0.9 0.6 - 0.9 cm    LVIDd 3.4 (A) 3.9 - 5.3 cm    LVIDs 2.3 cm    LVOT Diameter 1.9 cm    LVOT Mean Gradient 3 mmHg    LVOT VTI 21.3 cm    LVOT Peak Velocity 1.2 m/s    LVOT Peak Gradient 5 mmHg    LVPWd 0.9 0.6 - 0.9 cm    LV E' Lateral Velocity 7 cm/s    LV E' Septal Velocity 5 cm/s    LV Ejection Fraction A2C 62 %    LV Ejection Fraction A4C 64 %    EF BP 64 55 - 100 %    LVOT Area 2.8 cm2    LVOT SV 60.4 ml    LA Minor Axis 6.4 cm    LA Major Axis 6.0 cm    LA Area 2C 24.4 cm2    LA Area 4C 22.6 cm2    LA Volume 2C 76 (A) 22 - 52 mL    LA Volume 4C 69 (A) 22 - 52 mL    LA Volume BP 74 (A) 22 - 52 mL    LA Diameter 2.9 cm    AV Mean Velocity 0.9 m/s    AV Mean Gradient 4 mmHg    AV VTI 25.8 cm    AV Peak Velocity 1.3 m/s    AV Peak Gradient 7 mmHg    AV Area by VTI 2.3 cm2    AV Area by Peak Velocity 2.5 cm2    Aortic Root 3.1 cm    Ascending Aorta 3.3 cm    MV E Wave Deceleration Time 264.0 ms    MV A Velocity 0.90 m/s    MV E Velocity 0.70 m/s    PV .0 ms    PV Max Velocity 0.9 m/s    PV Peak Gradient 3 mmHg    RV Basal Dimension 3.2 cm    RV Free Wall Peak S' 15 cm/s    TAPSE 2.1 1.7 cm    TR Max Velocity 2.49 m/s    TR Peak Gradient 25 mmHg    Body Surface Area 1.29 m2    Fractional Shortening 2D 32 28 - 44 %    LV ESV Index A4C 12 mL/m2    LV EDV Index A4C 32 mL/m2    LV ESV Index A2C 9 mL/m2    LV EDV Index A2C 23 mL/m2    LVIDd Index 2.54 cm/m2    LVIDs Index 1.72 cm/m2    LV RWT Ratio 0.53     LV Mass 2D 84.9 67 - 162 g    LV Mass 2D Index 63.3 43 - 95 g/m2    MV E/A 0.78     E/E' Ratio (Averaged) 12.00     E/E' Lateral 10.00     E/E' Septal 14.00     LA Volume Index BP 55 (A) 16 - 34 ml/m2    LVOT Stroke Volume Index 45.0 mL/m2    LA Volume Index 2C 57 (A) 16 - 34 mL/m2    LA Volume Index 4C 51 (A) 16 - 34 mL/m2    LA Size Index 2.16 cm/m2    LA/AO Root Ratio 0.94     Ao Root Index 2.31 cm/m2    Ascending Aorta Index 2.46 cm/m2    AV Velocity Ratio 0.92     LVOT:AV VTI Index 0.83     PETTY/BSA VTI 1.7 cm2/m2    PETTY/BSA Peak Velocity 1.9 cm2/m2    Est. RA Pressure 5 mmHg    RVSP 30 mmHg   CBC with Auto Differential    Collection Time: 03/07/23  4:55 AM   Result Value Ref Range    WBC 9.0 4.3 - 11.1 K/uL    RBC 3.21 (L) 4.05 - 5.2 M/uL    Hemoglobin 10.3 (L) 11.7 - 15.4 g/dL    Hematocrit 30.2 (L) 35.8 - 46.3 %    MCV 94.1 82.0 - 102.0 FL    MCH 32.1 26.1 - 32.9 PG    MCHC 34.1 31.4 - 35.0 g/dL    RDW 12.9 11.9 - 14.6 %    Platelets 279 150 - 450 K/uL    MPV 10.7 9.4 - 12.3 FL    nRBC 0.00 0.0 - 0.2 K/uL    Differential Type AUTOMATED      Seg Neutrophils 70 43 - 78 %    Lymphocytes 16 13 - 44 %    Monocytes 11 4.0 - 12.0 %    Eosinophils % 2 0.5 - 7.8 %    Basophils 0 0.0 - 2.0 %    Immature Granulocytes 1 0.0 - 5.0 %    Segs Absolute 6.3 1.7 - 8.2 K/UL    Absolute Lymph # 1.5 0.5 - 4.6 K/UL    Absolute Mono # 1.0 0.1 - 1.3 K/UL    Absolute Eos # 0.2 0.0 - 0.8 K/UL    Basophils Absolute 0.0 0.0 - 0.2 K/UL    Absolute Immature Granulocyte 0.1 0.0 - 0.5 K/UL   Basic Metabolic Panel w/ Reflex to MG    Collection Time: 03/07/23  4:55 AM   Result Value Ref Range    Sodium 137 133 - 143 mmol/L    Potassium 3.3 (L) 3.5 - 5.1 mmol/L    Chloride 105 101 - 110 mmol/L    CO2 26 21 - 32 mmol/L    Anion Gap 6 2 - 11 mmol/L    Glucose 97 65 - 100 mg/dL    BUN 13 8 - 23 MG/DL    Creatinine 0.41 (L) 0.6 - 1.0 MG/DL    Est, Glom Filt Rate >60 >60 ml/min/1.73m2    Calcium 8.8 8.3 - 10.4 MG/DL   Magnesium    Collection Time: 03/07/23  4:55 AM   Result Value Ref Range    Magnesium 2.2 1.8 - 2.4 mg/dL       I have personally reviewed imaging studies:  Other Studies:  XR CHEST (2 VW)   Final Result   Impression:      Improved aeration of the lungs compared to the prior exam with likely smaller    left-sided pleural fluid collection.       Kaleb Omer M.D.    3/7/2023 3:38:00 AM      FL MODIFIED BARIUM SWALLOW W VIDEO   Final Result   Unremarkable modified barium swallow      Please see speech pathology report for further details.      XR CHEST PORTABLE   Final  Result   Right lung is well-expanded and clear. Infiltrate or consolidation   mid to lower left lung possibly atelectasis and/or pneumonia.  Heart size   appears mildly enlarged but partially obscured due to the left lung opacity.  No   pneumothorax.  No significant right pleural effusion. Left pleural effusion not   excluded. Bilateral calcified breast implants are again noted.         XR CHEST PORTABLE   Final Result   Increase in a large left effusion.  This could obscure underlying pathology such    as pneumonia.  There may be a small right midlung pneumonia, as well       King Dorado M.D.    3/5/2023 1:03:00 AM      XR CHEST PORTABLE   Final Result   Left lower lobe pneumonia         CT HEAD WO CONTRAST   Final Result      1.  No acute cranial abnormality.   2.  Stable senescent changes, as above.             Current Meds:  Current Facility-Administered Medications   Medication Dose Route Frequency    potassium chloride (KLOR-CON M) extended release tablet 40 mEq  40 mEq Oral PRN    Or    potassium bicarb-citric acid (EFFER-K) effervescent tablet 40 mEq  40 mEq Oral PRN    Or    potassium chloride 10 mEq/100 mL IVPB (Peripheral Line)  10 mEq IntraVENous PRN    magnesium sulfate 2000 mg in 50 mL IVPB premix  2,000 mg IntraVENous PRN    sodium phosphate 10 mmol in sodium chloride 0.9 % 250 mL IVPB  10 mmol IntraVENous PRN    Or    sodium phosphate 15 mmol in sodium chloride 0.9 % 250 mL IVPB  15 mmol IntraVENous PRN    Or    sodium phosphate 20 mmol in sodium chloride 0.9 % 500 mL IVPB  20 mmol IntraVENous PRN    barium sulfate 40 % suspension 15 mL  15 mL Oral ONCE PRN    tuberculin injection 5 Units  5 Units IntraDERmal Once    budesonide (PULMICORT) nebulizer suspension 500 mcg  0.5 mg Nebulization BID    albuterol (PROVENTIL) nebulizer solution 2.5 mg  2.5 mg Nebulization Q6H PRN    furosemide (LASIX) injection 20 mg  20 mg IntraVENous BID    verapamil (CALAN SR) extended release tablet 180 mg  180 mg Oral  Nightly    [Held by provider] 0.9 % sodium chloride infusion   IntraVENous Continuous    sodium chloride flush 0.9 % injection 5-40 mL  5-40 mL IntraVENous 2 times per day    sodium chloride flush 0.9 % injection 5-40 mL  5-40 mL IntraVENous PRN    0.9 % sodium chloride infusion   IntraVENous PRN    famotidine (PEPCID) 20 mg in sodium chloride (PF) 0.9 % 10 mL injection  20 mg IntraVENous Daily    aluminum & magnesium hydroxide-simethicone (MAALOX) 200-200-20 MG/5ML suspension 30 mL  30 mL Oral Q6H PRN    cefTRIAXone (ROCEPHIN) 1,000 mg in sodium chloride 0.9 % 50 mL IVPB (mini-bag)  1,000 mg IntraVENous Q24H    vitamin B-12 (CYANOCOBALAMIN) tablet 1,000 mcg  1,000 mcg Oral Daily    donepezil (ARICEPT) tablet 10 mg  10 mg Oral Nightly    fluticasone (FLONASE) 50 MCG/ACT nasal spray 1 spray  1 spray Each Nostril Daily PRN    [Held by provider] lisinopril (PRINIVIL;ZESTRIL) tablet 20 mg  20 mg Oral Daily    QUEtiapine (SEROQUEL) tablet 25 mg  25 mg Oral Nightly    [Held by provider] rivaroxaban (XARELTO) tablet 20 mg  20 mg Oral Daily with breakfast    acetaminophen (TYLENOL) tablet 650 mg  650 mg Oral Q4H PRN    Or    acetaminophen (TYLENOL) suppository 650 mg  650 mg Rectal Q4H PRN    ondansetron (ZOFRAN-ODT) disintegrating tablet 4 mg  4 mg Oral Q8H PRN    Or    ondansetron (ZOFRAN) injection 4 mg  4 mg IntraVENous Q6H PRN    polyethylene glycol (GLYCOLAX) packet 17 g  17 g Oral Daily PRN    bisacodyl (DULCOLAX) suppository 10 mg  10 mg Rectal Daily PRN    atorvastatin (LIPITOR) tablet 80 mg  80 mg Oral Nightly    labetalol (NORMODYNE;TRANDATE) injection 10 mg  10 mg IntraVENous Q10 Min PRN    guaiFENesin (MUCINEX) extended release tablet 600 mg  600 mg Oral BID       Signed:  Javon Alexander MD

## 2023-03-07 NOTE — PROGRESS NOTES
ACUTE PHYSICAL THERAPY GOALS:   (Developed with and agreed upon by patient and/or caregiver. )    LTG:  (1.)Ms. Elaine Groves will move from supine to sit and sit to supine  in bed with SUPERVISION within 7 treatment day(s). (2.)Ms. Elaine Groves will transfer from bed to chair and chair to bed with SUPERVISION using the least restrictive device within 7 treatment day(s). (3.)Ms. Silva will ambulate with SUPERVISION for 250 feet with the least restrictive device within 7 treatment day(s). ________________________________________________________________________________________________     PHYSICAL THERAPY Daily Note and AM  (Link to Caseload Tracking: PT Visit Days : 5  Acknowledge Orders  Time In/Out  PT Charge Capture  Rehab Caseload Tracker    Fabián Dominguez is a 80 y.o. female   PRIMARY DIAGNOSIS: Sepsis ([facility] [address]  Confusion [R41.0]  Generalized weakness [R53.1]  Sepsis ([address] [A41.9]  Pneumonia of left lower lobe due to infectious organism [J18.9]  Cerebrovascular accident (CVA), unspecified mechanism ([address] [I63.9]  Dementia without behavioral disturbance, psychotic disturbance, mood disturbance, or anxiety, unspecified dementia severity, unspecified dementia type ([facility] [address] [F03.90]       Reason for Referral: Generalized Muscle Weakness (M62.81)  Other abnormalities of gait and mobility (R26.89)  Inpatient: Payor: MEDICARE / Plan: MEDICARE PART A AND B / Product Type: *No Product type* /     ASSESSMENT:     REHAB RECOMMENDATIONS:   Recommendation to date pending progress:  Setting:  HHPT versus STR    Equipment:    Rolling Walker     ASSESSMENT:  Ms. Elaine Groves presents with somewhat limited functional mobility from her baseline. She apparently can transfer and ambulate without assist or without assistive device at baseline and has 24/7 sitters. She will benefit from PT to increase her independence with mobility and plans to return home with sitter/family support. She was supine on contact and agreeable to therapy. 3/6: Patient was supine upon contact. On 5L O2. Pleasant and agreeable to therapy. Slight confusion. Sitter present. Performed bed mobility with SBA and transfers with CGA. Ambulated 180' with HHA followed by toileting and hygiene. Needed verbal cues for safety and sequencing. Good improvement in gait distance. SpO2 remained at 95% or above during session. Family is requesting STR at discharge. 3/7--pt supine on arrival, sleeping. Pt awoke and requesting to go to the bathroom. Sitter present, states pt takes steps to bedside commode. Pt on oxygen. Pt performed supine to sit to stand. Pt assisted with steps to bedside commode. Pt performed toileting. Pt assisted with steps to bed. Pt returned supine. PT and sitter dependently scooted pt up in bed. [facility] [place] [address] [address] AM-PAC 6 Clicks Basic Mobility Inpatient Short Form  AM-PAC Basic Mobility - Inpatient   How much help is needed turning from your back to your side while in a flat bed without using bedrails?: A Little  How much help is needed moving from lying on your back to sitting on the side of a flat bed without using bedrails?: A Little  How much help is needed moving to and from a bed to a chair?: A Little  How much help is needed standing up from a chair using your arms?: A Little  How much help is needed walking in hospital room?: A Little  How much help is needed climbing 3-5 steps with a railing?: A Little  [facility] Inpatient Mobility Raw Score : 18  AM-PAC Inpatient T-Scale Score : 43.63  Mobility Inpatient CMS 0-100% Score: 46.58  Mobility Inpatient CMS G-Code Modifier : CK    SUBJECTIVE:   Ms. Dylan Khan states, Andreakecia Cook are good workers. \"     Social/Functional Lives With: Spouse  Type of Home: House  Home Layout: Two level, Able to Live on Main level with bedroom/bathroom  Home Access: Stairs to enter with rails  Entrance Stairs - Number of Steps: 3  Entrance Stairs - Rails: Both  Bathroom Shower/Tub: Walk-in shower  Bathroom Toilet: Standard  Receives Help From: Family  ADL Assistance: Needs assistance  Ambulation Assistance: Independent  Transfer Assistance: Independent    OBJECTIVE:     PAIN: Gaylyn Nam / O2: PRECAUTION / Lavena Dino / DRAINS:   Pre Treatment:          Post Treatment: 0 Vitals        Oxygen      None    RESTRICTIONS/PRECAUTIONS:  Restrictions/Precautions: Fall Risk                 GROSS EVALUATION: Intact Impaired (Comments):   AROM [x]     PROM []    Strength [x]     Balance []   fair   Posture [] N/A   Sensation [x]     Coordination [x]      Tone [x]     Edema []    Activity Tolerance []  Limited by fatigue    []      COGNITION/  PERCEPTION: Intact Impaired (Comments):   Orientation [x]     Vision [x]     Hearing [x]     Cognition  [x]       MOBILITY: I Mod I S SBA CGA Min Mod Max Total  NT x2 Comments:   Bed Mobility    Rolling [] [] [] [] [] [] [] [] [] [] []    Supine to Sit [] [] [] [x] [] [] [] [] [] [] []    Scooting [] [] [] [x] [] [] [] [] [] [] []    Sit to Supine [] [] [] [x] [] [] [] [] [] [] []    Transfers    Sit to Stand [] [] [] [] [x] [] [] [] [] [] []    Bed to Chair [] [] [] [] [] [] [] [] [] [] []    Stand to Sit [] [] [] [] [x] [] [] [] [] [] []     [] [] [] [] [] [] [] [] [] [] []    I=Independent, Mod I=Modified Independent, S=Supervision, SBA=Standby Assistance, CGA=Contact Guard Assistance,   Min=Minimal Assistance, Mod=Moderate Assistance, Max=Maximal Assistance, Total=Total Assistance, NT=Not Tested    GAIT: I Mod I S SBA CGA Min Mod Max Total  NT x2 Comments:   Level of Assistance [] [] [] [] [] [x] [] [] [] []     Distance 10'X2    DME HHA    Gait Quality Decreased step length, decreased dilip, decreased step clearance, and path deviations    Weightbearing Status Fall risk    Stairs      I=Independent, Mod I=Modified Independent, S=Supervision, SBA=Standby Assistance, CGA=Contact Guard Assistance,   Min=Minimal Assistance, Mod=Moderate Assistance, Max=Maximal Assistance, Total=Total Assistance, NT=Not Tested    PLAN:   FREQUENCY AND DURATION: Daily for duration of hospital stay or until stated goals are met, whichever comes first.    THERAPY PROGNOSIS: Good    PROBLEM LIST:   (Skilled intervention is medically necessary to address:)  Decreased ADL/Functional Activities  Decreased Activity Tolerance  Decreased Balance  Decreased Gait Ability  Decreased Safety Awareness  Decreased Strength  Decreased Transfer Abilities INTERVENTIONS PLANNED:   (Benefits and precautions of physical therapy have been discussed with the patient.)  Therapeutic Activity  Therapeutic Exercise/HEP  Gait Training  Manual Therapy  Education       TREATMENT:       TREATMENT:   Therapeutic Activity (25 Minutes): Therapeutic activity included Rolling, Supine to Sit, Sit to Supine, Scooting, Transfer Training, Ambulation on level ground, Sitting balance , and Standing balance to improve functional Activity tolerance, Balance, Coordination, Mobility, and Strength. TREATMENT GRID:  N/A    AFTER TREATMENT PRECAUTIONS: Bed, Bed/Chair Locked, Call light within reach, Needs within reach, Side rails x2, and sitter at bedside .      INTERDISCIPLINARY COLLABORATION:  RN/ PCT, PT/ PTA, and OT/ [place]    EDUCATION: Education Given To: Patient;Caregiver  Education Provided: Role of Therapy;Plan of Care  Education Method: Demonstration;Verbal  Education Outcome: Demonstrated understanding;Continued education needed    TIME IN/OUT:  Time In: 1072  Time Out: [address] [place] [address]  Minutes: [place], PT

## 2023-03-07 NOTE — CARE COORDINATION
YUNIEL following with Ann Villalta this am, referral pending and a decision has not been made. SW attempted to call [place] to discuss, no answer, VM not left as message does not have identifying information and is a male's voice.      Akiko iDop LMSW    [address] [facility]

## 2023-03-08 LAB
ANION GAP SERPL CALC-SCNC: 4 MMOL/L (ref 2–11)
BUN SERPL-MCNC: 12 MG/DL (ref 8–23)
CALCIUM SERPL-MCNC: 8.8 MG/DL (ref 8.3–10.4)
CHLORIDE SERPL-SCNC: 105 MMOL/L (ref 101–110)
CO2 SERPL-SCNC: 29 MMOL/L (ref 21–32)
CREAT SERPL-MCNC: 0.46 MG/DL (ref 0.6–1)
GLUCOSE SERPL-MCNC: 91 MG/DL (ref 65–100)
MAGNESIUM SERPL-MCNC: 2.1 MG/DL (ref 1.8–2.4)
POTASSIUM SERPL-SCNC: 3.4 MMOL/L (ref 3.5–5.1)
SODIUM SERPL-SCNC: 138 MMOL/L (ref 133–143)

## 2023-03-08 PROCEDURE — 6360000002 HC RX W HCPCS: Performed by: INTERNAL MEDICINE

## 2023-03-08 PROCEDURE — 36415 COLL VENOUS BLD VENIPUNCTURE: CPT

## 2023-03-08 PROCEDURE — 83735 ASSAY OF MAGNESIUM: CPT

## 2023-03-08 PROCEDURE — 6370000000 HC RX 637 (ALT 250 FOR IP): Performed by: FAMILY MEDICINE

## 2023-03-08 PROCEDURE — 6360000002 HC RX W HCPCS: Performed by: FAMILY MEDICINE

## 2023-03-08 PROCEDURE — 6370000000 HC RX 637 (ALT 250 FOR IP): Performed by: INTERNAL MEDICINE

## 2023-03-08 PROCEDURE — 97530 THERAPEUTIC ACTIVITIES: CPT

## 2023-03-08 PROCEDURE — 94640 AIRWAY INHALATION TREATMENT: CPT

## 2023-03-08 PROCEDURE — 2580000003 HC RX 258: Performed by: INTERNAL MEDICINE

## 2023-03-08 PROCEDURE — 1100000000 HC RM PRIVATE

## 2023-03-08 PROCEDURE — 2700000000 HC OXYGEN THERAPY PER DAY

## 2023-03-08 PROCEDURE — A4216 STERILE WATER/SALINE, 10 ML: HCPCS | Performed by: INTERNAL MEDICINE

## 2023-03-08 PROCEDURE — 2500000003 HC RX 250 WO HCPCS: Performed by: INTERNAL MEDICINE

## 2023-03-08 PROCEDURE — 80048 BASIC METABOLIC PNL TOTAL CA: CPT

## 2023-03-08 PROCEDURE — 2500000003 HC RX 250 WO HCPCS: Performed by: FAMILY MEDICINE

## 2023-03-08 PROCEDURE — 94760 N-INVAS EAR/PLS OXIMETRY 1: CPT

## 2023-03-08 RX ADMIN — DONEPEZIL HYDROCHLORIDE 10 MG: 5 TABLET ORAL at 20:23

## 2023-03-08 RX ADMIN — POTASSIUM BICARBONATE 40 MEQ: 782 TABLET, EFFERVESCENT ORAL at 08:55

## 2023-03-08 RX ADMIN — CYANOCOBALAMIN TAB 1000 MCG 1000 MCG: 1000 TAB at 08:51

## 2023-03-08 RX ADMIN — ATORVASTATIN CALCIUM 80 MG: 40 TABLET, FILM COATED ORAL at 20:23

## 2023-03-08 RX ADMIN — BUMETANIDE 1 MG: 0.25 INJECTION INTRAMUSCULAR; INTRAVENOUS at 20:22

## 2023-03-08 RX ADMIN — SODIUM CHLORIDE, PRESERVATIVE FREE 10 ML: 5 INJECTION INTRAVENOUS at 20:23

## 2023-03-08 RX ADMIN — CEFTRIAXONE 1000 MG: 1 INJECTION, POWDER, FOR SOLUTION INTRAMUSCULAR; INTRAVENOUS at 17:30

## 2023-03-08 RX ADMIN — BUDESONIDE 500 MCG: 0.5 INHALANT RESPIRATORY (INHALATION) at 20:19

## 2023-03-08 RX ADMIN — VERAPAMIL HYDROCHLORIDE 180 MG: 180 TABLET, FILM COATED, EXTENDED RELEASE ORAL at 20:23

## 2023-03-08 RX ADMIN — FAMOTIDINE 20 MG: 10 INJECTION, SOLUTION INTRAVENOUS at 08:51

## 2023-03-08 RX ADMIN — BUMETANIDE 1 MG: 0.25 INJECTION INTRAMUSCULAR; INTRAVENOUS at 08:51

## 2023-03-08 RX ADMIN — QUETIAPINE FUMARATE 25 MG: 25 TABLET ORAL at 20:23

## 2023-03-08 NOTE — PROGRESS NOTES
Hospitalist Progress Note   Admit Date:  3/2/2023  4:09 PM   Name:  Nahum Orta   Age:  80 y.o. Sex:  female  :  1940   MRN:  [id number]   Room:  [facility]/    Presenting Complaint: Facial Droop     Reason(s) for Admission: Confusion [R41.0]  Generalized weakness [R53.1]  Sepsis ([facility] [address] [A41.9]  Pneumonia of left lower lobe due to infectious organism [J18.9]  Cerebrovascular accident (CVA), unspecified mechanism ([address] [I63.9]  Dementia without behavioral disturbance, psychotic disturbance, mood disturbance, or anxiety, unspecified dementia severity, unspecified dementia type ([facility] [address] [F03.90]     Hospital Course:   82F PMHx paroxysmal atrial fibrillation on Xarelto and verapamil rate control, hypertension and dementia with behavioral abnormalities on Seroquel nocturnally. She had been visiting her  in the hospital with a hernia recently and had fatigue. Family reported that patient was difficult to arouse, weak and could not stand. Noticed increased temp complained of left shoulder plain and family felt possible left-sided weakness and possible left facial droop. Droop not felt to be significant by ER or neurology and initial work-up for code stroke negative. Patient found to have sepsis with fever leukocytosis and a new left lower lobe pneumonia. Neurology felt symptoms likely related to her baseline dementia with acute infectious process. Patient after receiving IV fluids somewhat more alert conversant but alert to person only. Pleasant and cooperative and maintains social graces. Discussed with daughter and son at bedside and patient to be admitted and will honor DO NOT RESUSCITATE status. Patient has had outpatient palliative care consultations.   Patient does have behavioral abnormality with her dementia with nocturnal confusion and will continue her nocturnal Seroquel but family expects she could have aggravation of behavioral abnormalities with change in environment during hospitalization. Discussed planned antibiotics supportive care and continued home meds including anticoagulation and rate control regarding atrial fibrillation. No dysuria. Serum creatinine okay. Lactic acid pending. A-fib apparently paroxysmal and EKG was sinus mechanism noted. Subjective & 24hr Events (03/08/23):  also currently hospitalized. Case management working on dual placement. Worked with PT, OT. Continues to have mild derangement of electrolytes. Plan discussed with family, nurse and . Awaiting insurance approval. Remains on O2. Assessment & Plan:   Sepsis  Resolved    Left lower lobe pneumonia  -Ceftriaxone    Dementia with behavioral disturbance  -Donepezil, quetiapine    Dysphagia  -SLP    Acute hypoxemic respiratory failure  -Maintain O2 sat greater than 92  -Pulmonary hygiene  -Bumetanide  -Budesonide    Pleural effusion  -Bumetanide    Primary hypertension  -restart lisinopril    Paroxysmal atrial fibrillation  -restart rivaroxaban  -Verapamil      PT/OT evals and PPD needed/ordered?   Yes  Diet:  ADULT DIET; Easy to Chew  VTE prophylaxis: SCD's   Code status: DNR    Hospital Problems:  Principal Problem:    Acute hypoxemic respiratory failure (HCC)  Active Problems:    Left lower lobe pneumonia    Dementia with behavioral disturbance    Dysphagia    Pleural effusion    Acute diastolic (congestive) heart failure (HCC)    Essential hypertension    Paroxysmal atrial fibrillation (HCC)  Resolved Problems:    Sepsis ([address]      Objective:   Patient Vitals for the past 24 hrs:   Temp Pulse Resp BP SpO2   03/08/23 0855 -- 73 -- -- 98 %   03/08/23 0750 98.1 °F (36.7 °C) 75 17 118/68 95 %   03/08/23 0407 -- 69 14 108/65 96 %   03/07/23 2357 97.7 °F (36.5 °C) 69 14 (!) 108/56 96 %   03/07/23 2052 -- -- -- 132/62 --   03/07/23 2005 -- 78 16 -- 97 %   03/07/23 1947 99 °F (37.2 °C) 84 14 132/62 97 %   03/07/23 1525 99 °F (37.2 °C) 69 16 135/78 99 %   03/07/23 1143 98.8 °F (37.1 °C) 70 18 137/88 98 %         Oxygen Therapy  SpO2: 98 %  Pulse Oximeter Device Mode: Intermittent  Pulse Oximeter Device Location: Right, Finger  O2 Device: Nasal cannula  O2 Flow Rate (L/min): 2 L/min (Weaned to 1L)    Estimated body mass index is 20.48 kg/m² as calculated from the following:    Height as of this encounter: 4' 10\" (1.473 m). Weight as of this encounter: 98 lb (44.5 kg). Intake/Output Summary (Last 24 hours) at 3/8/2023 0957  Last data filed at 3/7/2023 2052  Gross per 24 hour   Intake --   Output 400 ml   Net -400 ml           Blood pressure 118/68, pulse 73, temperature 98.1 °F (36.7 °C), temperature source Oral, resp. rate 17, height 4' 10\" (1.473 m), weight 98 lb (44.5 kg), SpO2 98 %. Physical Exam  Vitals and nursing note reviewed. Constitutional:       General: She is not in acute distress. Appearance: She is ill-appearing. She is not diaphoretic. Eyes:      Extraocular Movements: Extraocular movements intact. Cardiovascular:      Rate and Rhythm: Normal rate. Rhythm irregular. Pulmonary:      Effort: Pulmonary effort is normal. No respiratory distress. Breath sounds: No wheezing or rhonchi. Abdominal:      General: There is no distension. Musculoskeletal:         General: No deformity. Skin:     Coloration: Skin is not jaundiced or pale. Neurological:      General: No focal deficit present. Mental Status: She is alert. Psychiatric:         Mood and Affect: Affect normal. Mood is depressed. Behavior: Behavior is slowed. Cognition and Memory: Cognition is impaired.          I have personally reviewed labs and tests:  Recent Labs:  Recent Results (from the past 48 hour(s))   CBC with Auto Differential    Collection Time: 03/07/23  4:55 AM   Result Value Ref Range    WBC 9.0 4.3 - 11.1 K/uL    RBC 3.21 (L) 4.05 - 5.2 M/uL    Hemoglobin 10.3 (L) 11.7 - 15.4 g/dL    Hematocrit 30.2 (L) 35.8 - 46.3 %    MCV 94.1 82.0 - 102.0 FL    MCH 32.1 26.1 - 32.9 PG    MCHC 34.1 31.4 - 35.0 g/dL    RDW 12.9 11.9 - 14.6 %    Platelets 210 230 - 609 K/uL    MPV 10.7 9.4 - 12.3 FL    nRBC 0.00 0.0 - 0.2 K/uL    Differential Type AUTOMATED      Seg Neutrophils 70 43 - 78 %    Lymphocytes 16 13 - 44 %    Monocytes 11 4.0 - 12.0 %    Eosinophils % 2 0.5 - 7.8 %    Basophils 0 0.0 - 2.0 %    Immature Granulocytes 1 0.0 - 5.0 %    Segs Absolute 6.3 1.7 - 8.2 K/UL    Absolute Lymph # 1.5 0.5 - 4.6 K/UL    Absolute Mono # 1.0 0.1 - 1.3 K/UL    Absolute Eos # 0.2 0.0 - 0.8 K/UL    Basophils Absolute 0.0 0.0 - 0.2 K/UL    Absolute Immature Granulocyte 0.1 0.0 - 0.5 K/UL   Basic Metabolic Panel w/ Reflex to MG    Collection Time: 03/07/23  4:55 AM   Result Value Ref Range    Sodium 137 133 - 143 mmol/L    Potassium 3.3 (L) 3.5 - 5.1 mmol/L    Chloride 105 101 - 110 mmol/L    CO2 26 21 - 32 mmol/L    Anion Gap 6 2 - 11 mmol/L    Glucose 97 65 - 100 mg/dL    BUN 13 8 - 23 MG/DL    Creatinine 0.41 (L) 0.6 - 1.0 MG/DL    Est, Glom Filt Rate >60 >60 ml/min/1.73m2    Calcium 8.8 8.3 - 10.4 MG/DL   Magnesium    Collection Time: 03/07/23  4:55 AM   Result Value Ref Range    Magnesium 2.2 1.8 - 2.4 mg/dL   Lactate Dehydrogenase    Collection Time: 03/07/23  4:55 AM   Result Value Ref Range     (H) 110 - 210 U/L   Protein, Total    Collection Time: 03/07/23  4:55 AM   Result Value Ref Range    Total Protein 5.5 (L) 6.3 - 8.2 g/dL   Lactate Dehydrogenase, Body Fluid    Collection Time: 03/07/23  3:56 PM   Result Value Ref Range    Fluid Type THORACENTESIS      LD, Fluid 178 U/L   Glucose, Body Fluid    Collection Time: 03/07/23  3:56 PM   Result Value Ref Range    Fluid Type THORACENTESIS      Glucose, Body Fluid 96 MG/DL   pH, Body Fluid    Collection Time: 03/07/23  3:56 PM   Result Value Ref Range    Fluid Type THORACENTESIS      pH, Fluid 7.0     Protein, Body Fluid    Collection Time: 03/07/23  3:56 PM   Result Value Ref Range    Fluid Type THORACENTESIS      Total Protein, Body Fluid 3.0 g/dL   Body fluid cell count    Collection Time: 03/07/23  3:56 PM   Result Value Ref Range    Specimen THORACENTESIS      RBC, Fluid 22,000 /cu mm    WBC, Fluid 1,116 /cu mm    Segmented Neutrophils, BAL 91 %    Lymphocytes, BAL 9 %    Color, Fluid RED      Appearance, Fluid TURBID     Culture, Body Fluid    Collection Time: 03/07/23  3:56 PM    Specimen: Left; Body Fluid   Result Value Ref Range    Special Requests LEFT  PLEURAL EFFUSION        Gram stain PENDING     Culture PENDING    PLEASE READ & DOCUMENT PPD TEST IN 24 HRS    Collection Time: 03/07/23  5:00 PM   Result Value Ref Range    PPD, (POC) Negative Negative    mm Induration 0 0 - 5 mm   Basic Metabolic Panel w/ Reflex to MG    Collection Time: 03/08/23  4:47 AM   Result Value Ref Range    Sodium 138 133 - 143 mmol/L    Potassium 3.4 (L) 3.5 - 5.1 mmol/L    Chloride 105 101 - 110 mmol/L    CO2 29 21 - 32 mmol/L    Anion Gap 4 2 - 11 mmol/L    Glucose 91 65 - 100 mg/dL    BUN 12 8 - 23 MG/DL    Creatinine 0.46 (L) 0.6 - 1.0 MG/DL    Est, Glom Filt Rate >60 >60 ml/min/1.73m2    Calcium 8.8 8.3 - 10.4 MG/DL   Magnesium    Collection Time: 03/08/23  4:47 AM   Result Value Ref Range    Magnesium 2.1 1.8 - 2.4 mg/dL       I have personally reviewed imaging studies:  Other Studies:  XR CHEST (2 VW)   Final Result   Impression:      Improved aeration of the lungs compared to the prior exam with likely smaller    left-sided pleural fluid collection. Merlene Landau, M.D.    3/7/2023 3:38:00 AM      FL MODIFIED BARIUM SWALLOW W VIDEO   Final Result   Unremarkable modified barium swallow      Please see speech pathology report for further details. XR CHEST PORTABLE   Final Result   Right lung is well-expanded and clear. Infiltrate or consolidation   mid to lower left lung possibly atelectasis and/or pneumonia. Heart size   appears mildly enlarged but partially obscured due to the left lung opacity. No   pneumothorax.   No significant right pleural effusion. Left pleural effusion not   excluded. Bilateral calcified breast implants are again noted. XR CHEST PORTABLE   Final Result   Increase in a large left effusion. This could obscure underlying pathology such    as pneumonia. There may be a small right midlung pneumonia, as well       David Harrison M.D.    3/5/2023 1:03:00 AM      XR CHEST PORTABLE   Final Result   Left lower lobe pneumonia         CT HEAD WO CONTRAST   Final Result      1. No acute cranial abnormality. 2.  Stable senescent changes, as above.              Current Meds:  Current Facility-Administered Medications   Medication Dose Route Frequency    potassium chloride (KLOR-CON M) extended release tablet 40 mEq  40 mEq Oral PRN    Or    potassium bicarb-citric acid (EFFER-K) effervescent tablet 40 mEq  40 mEq Oral PRN    Or    potassium chloride 10 mEq/100 mL IVPB (Peripheral Line)  10 mEq IntraVENous PRN    magnesium sulfate 2000 mg in 50 mL IVPB premix  2,000 mg IntraVENous PRN    sodium phosphate 10 mmol in sodium chloride 0.9 % 250 mL IVPB  10 mmol IntraVENous PRN    Or    sodium phosphate 15 mmol in sodium chloride 0.9 % 250 mL IVPB  15 mmol IntraVENous PRN    Or    sodium phosphate 20 mmol in sodium chloride 0.9 % 500 mL IVPB  20 mmol IntraVENous PRN    bumetanide (BUMEX) injection 1 mg  1 mg IntraVENous BID    barium sulfate 40 % suspension 15 mL  15 mL Oral ONCE PRN    budesonide (PULMICORT) nebulizer suspension 500 mcg  0.5 mg Nebulization BID    albuterol (PROVENTIL) nebulizer solution 2.5 mg  2.5 mg Nebulization Q6H PRN    verapamil (CALAN SR) extended release tablet 180 mg  180 mg Oral Nightly    [Held by provider] 0.9 % sodium chloride infusion   IntraVENous Continuous    sodium chloride flush 0.9 % injection 5-40 mL  5-40 mL IntraVENous 2 times per day    sodium chloride flush 0.9 % injection 5-40 mL  5-40 mL IntraVENous PRN    0.9 % sodium chloride infusion   IntraVENous PRN    famotidine (PEPCID) 20 mg in sodium chloride (PF) 0.9 % 10 mL injection  20 mg IntraVENous Daily    aluminum & magnesium hydroxide-simethicone (MAALOX) 200-200-20 MG/5ML suspension 30 mL  30 mL Oral Q6H PRN    cefTRIAXone (ROCEPHIN) 1,000 mg in sodium chloride 0.9 % 50 mL IVPB (mini-bag)  1,000 mg IntraVENous Q24H    vitamin B-12 (CYANOCOBALAMIN) tablet 1,000 mcg  1,000 mcg Oral Daily    donepezil (ARICEPT) tablet 10 mg  10 mg Oral Nightly    fluticasone (FLONASE) 50 MCG/ACT nasal spray 1 spray  1 spray Each Nostril Daily PRN    [Held by provider] lisinopril (PRINIVIL;ZESTRIL) tablet 20 mg  20 mg Oral Daily    QUEtiapine (SEROQUEL) tablet 25 mg  25 mg Oral Nightly    [Held by provider] rivaroxaban (XARELTO) tablet 20 mg  20 mg Oral Daily with breakfast    acetaminophen (TYLENOL) tablet 650 mg  650 mg Oral Q4H PRN    Or    acetaminophen (TYLENOL) suppository 650 mg  650 mg Rectal Q4H PRN    ondansetron (ZOFRAN-ODT) disintegrating tablet 4 mg  4 mg Oral Q8H PRN    Or    ondansetron (ZOFRAN) injection 4 mg  4 mg IntraVENous Q6H PRN    polyethylene glycol (GLYCOLAX) packet 17 g  17 g Oral Daily PRN    bisacodyl (DULCOLAX) suppository 10 mg  10 mg Rectal Daily PRN    atorvastatin (LIPITOR) tablet 80 mg  80 mg Oral Nightly    labetalol (NORMODYNE;TRANDATE) injection 10 mg  10 mg IntraVENous Q10 Min PRN       Signed:  Andrew Elias MD

## 2023-03-08 NOTE — PROGRESS NOTES
ACUTE PHYSICAL THERAPY GOALS:   (Developed with and agreed upon by patient and/or caregiver. )    LTG:  (1.)Ms. Virgie Hernandez will move from supine to sit and sit to supine  in bed with SUPERVISION within 7 treatment day(s). (2.)Ms. Virgie Hernandez will transfer from bed to chair and chair to bed with SUPERVISION using the least restrictive device within 7 treatment day(s). (3.)Ms. Silva will ambulate with SUPERVISION for 250 feet with the least restrictive device within 7 treatment day(s). ________________________________________________________________________________________________     PHYSICAL THERAPY Daily Note and AM  (Link to Caseload Tracking: PT Visit Days : 5  Acknowledge Orders  Time In/Out  PT Charge Capture  Rehab Caseload Tracker    Raz Vargas is a 80 y.o. female   PRIMARY DIAGNOSIS: Acute hypoxemic respiratory failure ([facility] [address]  Confusion [R41.0]  Generalized weakness [R53.1]  Sepsis ([facility] [address] [A41.9]  Pneumonia of left lower lobe due to infectious organism [J18.9]  Cerebrovascular accident (CVA), unspecified mechanism ([address] [I63.9]  Dementia without behavioral disturbance, psychotic disturbance, mood disturbance, or anxiety, unspecified dementia severity, unspecified dementia type ([facility] [address] [F03.90]       Reason for Referral: Generalized Muscle Weakness (M62.81)  Other abnormalities of gait and mobility (R26.89)  Inpatient: Payor: MEDICARE / Plan: MEDICARE PART A AND B / Product Type: *No Product type* /     ASSESSMENT:     REHAB RECOMMENDATIONS:   Recommendation to date pending progress:  Setting:  HHPT versus STR    Equipment:    Rolling Walker     ASSESSMENT:  Ms. Virgie Hernandez presents with somewhat limited functional mobility from her baseline. She apparently can transfer and ambulate without assist or without assistive device at baseline and has 24/7 sitters. She will benefit from PT to increase her independence with mobility and plans to return home with sitter/family support.  She was supine on contact and agreeable to therapy. 3/6: Patient was supine upon contact. On 5L O2. Pleasant and agreeable to therapy. Slight confusion. Sitter present. Performed bed mobility with SBA and transfers with CGA. Ambulated 180' with HHA followed by toileting and hygiene. Needed verbal cues for safety and sequencing. Good improvement in gait distance. SpO2 remained at 95% or above during session. Family is requesting STR at discharge. 3/7--pt supine on arrival, sleeping. Pt awoke and requesting to go to the bathroom. Sitter present, states pt takes steps to bedside commode. Pt on oxygen. Pt performed supine to sit to stand. Pt assisted with steps to bedside commode. Pt performed toileting. Pt assisted with steps to bed. Pt returned supine. PT and sitter dependently scooted pt up in bed.  3/8--pt supine on arrival. Pt performed supine to sit. Pt assisted sit to stand. Pt took steps to bedside commode. Pt doffed brief. Pt performed toileting. Pt donned brief. Pt performed sit to stand. Pt ambulated in room. Pt returned supine. Pt supine with needs in reach. [facility] 6 Clicks Basic Mobility Inpatient Short Form  AM-PAC Basic Mobility - Inpatient   How much help is needed turning from your back to your side while in a flat bed without using bedrails?: A Little  How much help is needed moving from lying on your back to sitting on the side of a flat bed without using bedrails?: A Little  How much help is needed moving to and from a bed to a chair?: A Little  How much help is needed standing up from a chair using your arms?: A Little  How much help is needed walking in hospital room?: A Little  How much help is needed climbing 3-5 steps with a railing?: A Little  AM-[facility] Inpatient Mobility Raw Score : 18  AM-PAC Inpatient T-Scale Score : 43.63  Mobility Inpatient CMS 0-100% Score: 46.58  Mobility Inpatient CMS G-Code Modifier : CK    SUBJECTIVE:   Ms. Amara Tracy states, \"thank you. \" Social/Functional Lives With: Spouse  Type of Home: House  Home Layout: Two level, Able to Live on Main level with bedroom/bathroom  Home Access: Stairs to enter with rails  Entrance Stairs - Number of Steps: 3  Entrance Stairs - Rails: Both  Bathroom Shower/Tub: Walk-in shower  Bathroom Toilet: Standard  Receives Help From: Family  ADL Assistance: Needs assistance  Ambulation Assistance: Independent  Transfer Assistance: Independent    OBJECTIVE:     PAIN: Arvil Octave / O2: Donato Bryan / Ilsa Shore / Nicole Grand:   Pre Treatment:          Post Treatment: 0 Vitals        Oxygen  O2 Flow Rate (L/min): 1 L/min   None    RESTRICTIONS/PRECAUTIONS:  Restrictions/Precautions: Fall Risk                 GROSS EVALUATION: Intact Impaired (Comments):   AROM [x]     PROM []    Strength [x]     Balance []   fair   Posture [] N/A   Sensation [x]     Coordination [x]      Tone [x]     Edema []    Activity Tolerance []  Limited by fatigue    []      COGNITION/  PERCEPTION: Intact Impaired (Comments):   Orientation [x]     Vision [x]     Hearing [x]     Cognition  [x]       MOBILITY: I Mod I S SBA CGA Min Mod Max Total  NT x2 Comments:   Bed Mobility    Rolling [] [] [] [] [] [] [] [] [] [] []    Supine to Sit [] [] [] [x] [] [] [] [] [] [] []    Scooting [] [] [] [x] [] [] [] [] [] [] []    Sit to Supine [] [] [] [x] [] [] [] [] [] [] []    Transfers    Sit to Stand [] [] [] [] [x] [] [] [] [] [] []    Bed to Chair [] [] [] [] [] [] [] [] [] [] []    Stand to Sit [] [] [] [] [x] [] [] [] [] [] []     [] [] [] [] [] [] [] [] [] [] []    I=Independent, Mod I=Modified Independent, S=Supervision, SBA=Standby Assistance, CGA=Contact Guard Assistance,   Min=Minimal Assistance, Mod=Moderate Assistance, Max=Maximal Assistance, Total=Total Assistance, NT=Not Tested    GAIT: I Mod I S SBA CGA Min Mod Max Total  NT x2 Comments:   Level of Assistance [] [] [] [] [x] [] [] [] [] []  Pt declined going out of room, stating \"you don't look like this (wearing brief). \" Pt states she does not want to go out of her room due to not dressed how she would like to be to go out of her room. Distance 85 feet in room    DME HHA    Gait Quality Decreased step length, decreased dilip, decreased step clearance, and path deviations    Weightbearing Status Fall risk    Stairs      I=Independent, Mod I=Modified Independent, S=Supervision, SBA=Standby Assistance, CGA=Contact Guard Assistance,   Min=Minimal Assistance, Mod=Moderate Assistance, Max=Maximal Assistance, Total=Total Assistance, NT=Not Tested    PLAN:   FREQUENCY AND DURATION: Daily for duration of hospital stay or until stated goals are met, whichever comes first.    THERAPY PROGNOSIS: Good    PROBLEM LIST:   (Skilled intervention is medically necessary to address:)  Decreased ADL/Functional Activities  Decreased Activity Tolerance  Decreased Balance  Decreased Gait Ability  Decreased Safety Awareness  Decreased Strength  Decreased Transfer Abilities INTERVENTIONS PLANNED:   (Benefits and precautions of physical therapy have been discussed with the patient.)  Therapeutic Activity  Therapeutic Exercise/HEP  Gait Training  Manual Therapy  Education       TREATMENT:       TREATMENT:   Therapeutic Activity (25 Minutes): Therapeutic activity included Rolling, Supine to Sit, Sit to Supine, Scooting, Transfer Training, Ambulation on level ground, Sitting balance , and Standing balance to improve functional Activity tolerance, Balance, Coordination, Mobility, and Strength. TREATMENT GRID:  N/A    AFTER TREATMENT PRECAUTIONS: Bed, Bed/Chair Locked, Call light within reach, Needs within reach, Side rails x2, and sitter at bedside .      INTERDISCIPLINARY COLLABORATION:  RN/ PCT, PT/ PTA, and OT/ [place]    EDUCATION: Education Given To: Patient;Caregiver  Education Provided: Role of Therapy;Plan of Care;Transfer Training  Education Method: Demonstration;Verbal  Education Outcome: Verbalized understanding;Demonstrated understanding    TIME IN/OUT:  Time In: 0930  Time Out: 3194  Minutes: [place], PT

## 2023-03-08 NOTE — CARE COORDINATION
Interdisciplinary team meeting with attending, CM, nursing, PT and nutritional services for plan of care Patient is medically stable for d/c today. CM spoke with liaison Odalis Gurrola at [facility] and they do not have a bed today. CM notified that patient also has bed Friday for [facility] rehab. CM left message for daughter Rita Carranza to provide update of bed offers. CM following.

## 2023-03-09 ENCOUNTER — CARE COORDINATION (OUTPATIENT)
Dept: CARE COORDINATION | Facility: CLINIC | Age: 83
End: 2023-03-09

## 2023-03-09 VITALS
DIASTOLIC BLOOD PRESSURE: 62 MMHG | RESPIRATION RATE: 17 BRPM | OXYGEN SATURATION: 96 % | HEART RATE: 70 BPM | TEMPERATURE: 97.7 F | SYSTOLIC BLOOD PRESSURE: 111 MMHG | WEIGHT: 98 LBS | HEIGHT: 58 IN | BODY MASS INDEX: 20.57 KG/M2

## 2023-03-09 PROBLEM — J90 PLEURAL EFFUSION: Status: RESOLVED | Noted: 2023-03-06 | Resolved: 2023-03-09

## 2023-03-09 PROBLEM — J96.01 ACUTE HYPOXEMIC RESPIRATORY FAILURE (HCC): Status: RESOLVED | Noted: 2023-03-05 | Resolved: 2023-03-09

## 2023-03-09 PROBLEM — J18.9 LEFT LOWER LOBE PNEUMONIA: Status: RESOLVED | Noted: 2023-03-02 | Resolved: 2023-03-09

## 2023-03-09 PROBLEM — I10 ESSENTIAL HYPERTENSION: Chronic | Status: ACTIVE | Noted: 2019-02-04

## 2023-03-09 LAB
MM INDURATION, POC: 0 MM (ref 0–5)
PPD, POC: NEGATIVE
SARS-COV-2 RDRP RESP QL NAA+PROBE: NOT DETECTED
SOURCE: NORMAL

## 2023-03-09 PROCEDURE — 87635 SARS-COV-2 COVID-19 AMP PRB: CPT

## 2023-03-09 PROCEDURE — 97535 SELF CARE MNGMENT TRAINING: CPT

## 2023-03-09 PROCEDURE — 2500000003 HC RX 250 WO HCPCS: Performed by: INTERNAL MEDICINE

## 2023-03-09 PROCEDURE — 97530 THERAPEUTIC ACTIVITIES: CPT

## 2023-03-09 PROCEDURE — 2500000003 HC RX 250 WO HCPCS: Performed by: FAMILY MEDICINE

## 2023-03-09 PROCEDURE — A4216 STERILE WATER/SALINE, 10 ML: HCPCS | Performed by: INTERNAL MEDICINE

## 2023-03-09 PROCEDURE — 2580000003 HC RX 258: Performed by: INTERNAL MEDICINE

## 2023-03-09 PROCEDURE — 6370000000 HC RX 637 (ALT 250 FOR IP): Performed by: INTERNAL MEDICINE

## 2023-03-09 RX ORDER — CEFDINIR 300 MG/1
300 CAPSULE ORAL 2 TIMES DAILY
Qty: 10 CAPSULE | Refills: 0 | Status: SHIPPED | OUTPATIENT
Start: 2023-03-09 | End: 2023-03-14

## 2023-03-09 RX ORDER — ATORVASTATIN CALCIUM 80 MG/1
80 TABLET, FILM COATED ORAL NIGHTLY
Qty: 30 TABLET | Refills: 3 | Status: SHIPPED | OUTPATIENT
Start: 2023-03-09

## 2023-03-09 RX ADMIN — LISINOPRIL 20 MG: 20 TABLET ORAL at 09:02

## 2023-03-09 RX ADMIN — FAMOTIDINE 20 MG: 10 INJECTION, SOLUTION INTRAVENOUS at 09:02

## 2023-03-09 RX ADMIN — RIVAROXABAN 20 MG: 20 TABLET, FILM COATED ORAL at 09:02

## 2023-03-09 RX ADMIN — SODIUM CHLORIDE, PRESERVATIVE FREE 10 ML: 5 INJECTION INTRAVENOUS at 09:03

## 2023-03-09 RX ADMIN — CYANOCOBALAMIN TAB 1000 MCG 1000 MCG: 1000 TAB at 09:02

## 2023-03-09 RX ADMIN — BUMETANIDE 1 MG: 0.25 INJECTION INTRAMUSCULAR; INTRAVENOUS at 09:39

## 2023-03-09 ASSESSMENT — PAIN SCALES - GENERAL: PAINLEVEL_OUTOF10: 0

## 2023-03-09 NOTE — CARE COORDINATION
Pt is for discharge today to [address] [place] [address]  as planned. Transport via [employer] around 145pm.  Packet prepared to go with pt to facility. Spoke with daughter by phone with update on anticipated transport time. She is on the way to [facility] to do admission paperwork. 03/09/23 1234   Service Assessment   Patient's Healthcare Decision Maker is: Named in [address] History   Lives With Spouse   Type of [facility] Discharge   Transition of Care Consult (CM Consult) Discharge Planning;SNF   Partner SNF Yes   [address] (SNF)  ([address] [place] [address])   [address] Provided? No   Mode of Transport at Discharge Other (see comment)  ([employer] with DNR)   Confirm Follow Up Transport Other (see comment)  (stretcher transport)   Condition of Participation: Discharge Planning   The Plan for Transition of Care is related to the following treatment goals: Pt will need SNF rehab placement for care. The Patient and/or Patient Representative was provided with a Choice of Provider? Patient Representative   Name of the Patient Representative who was provided with the Choice of Provider and agrees with the Discharge Plan?  daughter   The Patient and/Or Patient Representative agree with the Discharge Plan? Yes   Freedom of Choice list was provided with basic dialogue that supports the patient's individualized plan of care/goals, treatment preferences, and shares the quality data associated with the providers?   Yes

## 2023-03-09 NOTE — CARE COORDINATION
Patient is currently enrolled in Post-Acute Episode:   Start day 3/9/2023  Risk for Admission or ED Visit: low  Please see patient outreach encounters for further details. For questions contact:     GREG Lockett, RN, CCM, ACM-RN/Post Acute Care Manager  [facility] Team - [place], [address] [facility]   Mobile: [email or web address]

## 2023-03-09 NOTE — PROGRESS NOTES
Discharged per MD orders. IV removed, tip intact. PPD read and documented. Patient has all personal belongings with her. Daughter is aware of discharge and in agreement with discharge plan. Telephone report called to Kimber Aleman at [facility] [phone]. Addendum 1639-Rapid COVID negative. Results printed and placed in discharge packet.

## 2023-03-09 NOTE — DISCHARGE SUMMARY
Hospitalist Discharge Summary   Admit Date:  3/2/2023  4:09 PM   DC Note date: 3/9/2023  Name:  Rosi Francis   Age:  80 y.o. Sex:  female  :  1940   MRN:  [id number]   Room:  [facility]  PCP:  Falguni Faye MD    Presenting Complaint: Facial Droop     Initial Admission Diagnosis: Confusion [R41.0]  Generalized weakness [R53.1]  Sepsis ([address] [A41.9]  Pneumonia of left lower lobe due to infectious organism [J18.9]  Cerebrovascular accident (CVA), unspecified mechanism ([address] [I63.9]  Dementia without behavioral disturbance, psychotic disturbance, mood disturbance, or anxiety, unspecified dementia severity, unspecified dementia type ([address] [F03.90]     Problem List for this Hospitalization (present on admission):    Principal Problem (Resolved):    Acute hypoxemic respiratory failure ([address]  Active Problems:    Dementia with behavioral disturbance    Dysphagia    Acute diastolic (congestive) heart failure ([address]    Primary hypertension    Paroxysmal atrial fibrillation ([address]  Resolved Problems:    Sepsis ([address]    Left lower lobe pneumonia    Pleural effusion      Hospital Course:  82F PMHx paroxysmal atrial fibrillation on Xarelto and verapamil rate control, hypertension and dementia with behavioral abnormalities on Seroquel nocturnally. She had been visiting her  in the hospital with a hernia recently and had fatigue. Family reported that patient was difficult to arouse, weak and could not stand. Noticed increased temp complained of left shoulder plain and family felt possible left-sided weakness and possible left facial droop. Droop not felt to be significant by ER or neurology and initial work-up for code stroke negative. Patient found to have sepsis with fever leukocytosis and a new left lower lobe pneumonia. Neurology felt symptoms likely related to her baseline dementia with acute infectious process.   Patient after receiving IV fluids somewhat more alert conversant but alert to person only.  Pleasant and cooperative. Discussed with daughter and son at bedside and patient was admitted and confirmed DO NOT RESUSCITATE status. Patient has had outpatient palliative care consultations. Patient had behavioral abnormality with her dementia with nocturnal confusion and will continue her nocturnal Seroquel but family expects she could have aggravation of behavioral abnormalities with change in environment during hospitalization. Placed on empiric antibiotics for pneumonia. Was on O2, but able to wean to room air 3/8. Worked with PT, OT. Was determined to be appropriate for discharge 3/9. Disposition: Short term reahb  Diet: ADULT DIET; Easy to Chew  Code Status: DNR    Follow Ups:   Contact information for follow-up providers     Jayson Barillas MD. Schedule an appointment as soon as possible for a visit   in 1 week(s). Specialty: Family Medicine  Why: Transition of Care Management  Contact information:  [address]  [facility] [phone]                   Contact information for after-discharge care     Discharge Destination     [address] [place] [address]) . Service: Skilled Nursing  Contact information:  [address] [facility] [address] [facility] [id number]  [phone]                           Time spent in patient discharge and coordination 47 minutes. Follow up labs/diagnostics (ultimately defer to outpatient provider): Completion of antibiotics  Medication changes noted below  Diastolic dysfunction as detailed below (consider transition to beta antagonist as tolerated)    Plan was discussed with patient, care giver, nurse, . All questions answered. Patient was stable at time of discharge. Instructions given to call a physician or return if any concerns.     Current Discharge Medication List        START taking these medications    Details   cefdinir (OMNICEF) 300 MG capsule Take 1 capsule by mouth 2 times daily for 5 days  Qty: 10 capsule, Refills: 0 CONTINUE these medications which have CHANGED    Details   atorvastatin (LIPITOR) 80 MG tablet Take 1 tablet by mouth nightly  Qty: 30 tablet, Refills: 3           CONTINUE these medications which have NOT CHANGED    Details   lisinopril (PRINIVIL;ZESTRIL) 20 MG tablet TAKE 1 TABLET EVERY DAY  Qty: 90 tablet, Refills: 3      Levocetirizine Dihydrochloride (XYZAL ALLERGY 24HR PO) Take by mouth daily      fluticasone (FLONASE) 50 MCG/ACT nasal spray 1 spray by Each Nostril route daily      Multiple Vitamins-Minerals (MULTIVITAMIN ADULTS PO) Take by mouth daily      verapamil (VERELAN) 240 MG extended release capsule Take 1 capsule by mouth nightly 1 every day  Qty: 90 capsule, Refills: 3      rivaroxaban (XARELTO) 20 MG TABS tablet Take 1 tablet by mouth daily (with breakfast)  Qty: 90 tablet, Refills: 3      donepezil (ARICEPT) 10 MG tablet Take 1 tablet by mouth nightly  Qty: 90 tablet, Refills: 3    Associated Diagnoses: Dementia without behavioral disturbance, unspecified dementia type      QUEtiapine (SEROQUEL) 25 MG tablet Take 1 tablet by mouth at bedtime  Qty: 90 tablet, Refills: 1    Associated Diagnoses: Dementia without behavioral disturbance, unspecified dementia type      cyanocobalamin 1000 MCG tablet Take 1,000 mcg by mouth daily             Some medications may have been reported old/obsolete and marked \"stop taking\" by the system; in reality pt was already off these meds; defer to outpatient or prescribing providers.     Procedures done this admission:  * No surgery found *    Consults this admission:  IP CONSULT TO CASE MANAGEMENT  IP CONSULT TO PHYSICAL MEDICINE REHAB  [facility]  IP CONSULT TO PULMONOLOGY    Echocardiogram results:  03/02/23    TRANSTHORACIC ECHOCARDIOGRAM (TTE) COMPLETE (CONTRAST/BUBBLE/3D PRN) 03/06/2023  1:02 PM, 03/06/2023 12:00 AM (Final)    Interpretation Summary    Left Ventricle: Normal left ventricular systolic function with a visually estimated EF of 55 - 60%. Left ventricle size is normal. Normal wall thickness. Normal wall motion. Abnormal diastolic function.    Mitral Valve: Mild regurgitation.    Tricuspid Valve: The estimated RVSP is 30 mmHg.    Left Atrium: Left atrium is mildly dilated.    Right Atrium: Right atrium is mildly dilated.    Aorta: Ao root diameter is 3.1 cm. Ao ascending diameter is 3.3 cm.    Technical qualifiers: Color flow Doppler was performed and pulse wave and/or continuous wave Doppler was performed.    Signed by: Kavon Eid MD on 3/6/2023  1:02 PM, Signed by: Unknown Provider Result on 3/6/2023 12:00 AM      Diagnostic Imaging/Tests:   XR CHEST (2 VW)    Result Date: 3/7/2023  Impression: Improved aeration of the lungs compared to the prior exam with likely smaller left-sided pleural fluid collection.  Kaleb Omer M.D. 3/7/2023 3:38:00 AM    CT HEAD WO CONTRAST    Result Date: 3/2/2023  1.  No acute cranial abnormality. 2.  Stable senescent changes, as above.     XR CHEST PORTABLE    Result Date: 3/6/2023  Right lung is well-expanded and clear. Infiltrate or consolidation mid to lower left lung possibly atelectasis and/or pneumonia.  Heart size appears mildly enlarged but partially obscured due to the left lung opacity.  No pneumothorax.  No significant right pleural effusion. Left pleural effusion not excluded. Bilateral calcified breast implants are again noted.     XR CHEST PORTABLE    Result Date: 3/5/2023  Increase in a large left effusion.  This could obscure underlying pathology such  as pneumonia.  There may be a small right midlung pneumonia, as well  King Dorado M.D. 3/5/2023 1:03:00 AM    XR CHEST PORTABLE    Result Date: 3/2/2023  Left lower lobe pneumonia     FL MODIFIED BARIUM SWALLOW W VIDEO    Result Date: 3/6/2023  Unremarkable modified barium swallow Please see speech pathology report for further details.       Labs: Results:       BMP, Mg, Phos Recent Labs     03/07/23  0455 03/08/23  0447    138  K 3.3* 3.4*    105   CO2 26 29   ANIONGAP 6 4   BUN 13 12   CREATININE 0.41* 0.46*   LABGLOM >60 >60   CALCIUM 8.8 8.8   GLUCOSE 97 91   MG 2.2 2.1      CBC Recent Labs     03/07/23  0455   WBC 9.0   RBC 3.21*   HGB 10.3*   HCT 30.2*   MCV 94.1   MCH 32.1   MCHC 34.1   RDW 12.9      MPV 10.7   NRBC 0.00   SEGS 70   LYMPHOPCT 16   EOSRELPCT 2   MONOPCT 11   BASOPCT 0   IMMGRAN 1   SEGSABS 6.3   LYMPHSABS 1.5   EOSABS 0.2   MONOSABS 1.0   BASOSABS 0.0   ABSIMMGRAN 0.1      LFT Recent Labs     03/07/23  0455   PROT 5.5*      Cardiac  Lab Results   Component Value Date/Time    NTPROBNP 3,914 03/06/2023 04:50 AM    NTPROBNP 6,482 03/05/2023 06:09 AM    TROPHS 18.5 03/02/2023 04:23 PM      Coags Lab Results   Component Value Date/Time    PROTIME 17.1 03/02/2023 04:25 PM    PROTIME 16.4 03/02/2023 04:23 PM    INR 1.5 03/02/2023 04:25 PM    INR 1.3 03/02/2023 04:23 PM      A1c Lab Results   Component Value Date/Time    LABA1C 5.7 03/03/2023 06:19 AM     03/03/2023 06:19 AM      Lipids Lab Results   Component Value Date/Time    CHOL 111 03/03/2023 06:19 AM    LDLCALC 41.6 03/03/2023 06:19 AM    LABVLDL 14.4 03/03/2023 06:19 AM    HDL 55 03/03/2023 06:19 AM    CHOLHDLRATIO 2.0 03/03/2023 06:19 AM    TRIG 72 03/03/2023 06:19 AM      Thyroid  Lab Results   Component Value Date/Time    UHQ6BPK 3.490 12/08/2022 08:50 AM        Most Recent UA Lab Results   Component Value Date/Time    COLORU YELLOW/STRAW 03/02/2023 05:31 PM    APPEARANCE CLEAR 03/02/2023 05:31 PM    SPECGRAV 1.028 03/02/2023 05:31 PM    LABPH 6.0 03/02/2023 05:31 PM    PROTEINU 30 03/02/2023 05:31 PM    GLUCOSEU Negative 03/02/2023 05:31 PM    KETUA Negative 03/02/2023 05:31 PM    BILIRUBINUR Negative 03/02/2023 05:31 PM    BILIRUBINUR Negative 09/14/2021 08:24 AM    BLOODU Negative 03/02/2023 05:31 PM    UROBILINOGEN 1.0 03/02/2023 05:31 PM    NITRU Negative 03/02/2023 05:31 PM    LEUKOCYTESUR Negative 03/02/2023 05:31 PM    WBCUA 0-3 03/02/2023 05:31 PM    RBCUA 0-3 03/02/2023 05:31 PM    EPITHUA 0-3 03/02/2023 05:31 PM    BACTERIA 0 03/02/2023 05:31 PM        Recent Labs     03/07/23  1556 03/02/23  1811 03/02/23  1753 03/02/23  1731   CULTURE NO GROWTH 1 DAY NO GROWTH 5 DAYS NO GROWTH 5 DAYS NO GROWTH 2 DAYS       All Labs from Last 24 Hrs:  No results found for this or any previous visit (from the past 24 hour(s)). No Known Allergies  Immunization History   Administered Date(s) Administered    COVID-19, MODERNA BLUE border, Primary or Immunocompromised, (age 12y+), IM, 100 mcg/0.5mL 01/11/2021, 02/11/2021    PPD Test 03/06/2023    Pneumococcal Conjugate 13-valent (Papi Card) 11/20/2014    Zoster Recombinant (Shingrix) 07/31/2019, 11/19/2019       Recent Vital Data:  Patient Vitals for the past 24 hrs:   Temp Pulse Resp BP SpO2   03/09/23 0738 98.1 °F (36.7 °C) 75 16 124/66 91 %   03/09/23 0408 -- 70 14 106/64 94 %   03/08/23 2020 -- 65 16 -- 96 %   03/08/23 1946 98.4 °F (36.9 °C) 88 16 136/79 94 %   03/08/23 1546 97.5 °F (36.4 °C) (!) 112 17 (!) 140/82 94 %   03/08/23 1445 -- -- -- -- 95 %   03/08/23 1208 97.3 °F (36.3 °C) 74 17 118/66 98 %       Oxygen Therapy  SpO2: 91 %  Pulse Oximeter Device Mode: Intermittent  Pulse Oximeter Device Location: Left, Finger  O2 Device: None (Room air)  FiO2 : 21 %  O2 Flow Rate (L/min): 1 L/min    Estimated body mass index is 20.48 kg/m² as calculated from the following:    Height as of this encounter: 4' 10\" (1.473 m). Weight as of this encounter: 98 lb (44.5 kg). Intake/Output Summary (Last 24 hours) at 3/9/2023 1138  Last data filed at 3/8/2023 1802  Gross per 24 hour   Intake --   Output 50 ml   Net -50 ml       Physical Exam  Vitals and nursing note reviewed. Constitutional:       General: She is not in acute distress. Appearance: She is not ill-appearing or diaphoretic. Eyes:      Extraocular Movements: Extraocular movements intact. Cardiovascular:      Rate and Rhythm: Normal rate. Rhythm irregular. Pulmonary:      Effort: Pulmonary effort is normal. No respiratory distress. Breath sounds: No wheezing or rhonchi. Abdominal:      General: There is no distension. Musculoskeletal:         General: No deformity. Skin:     Coloration: Skin is not jaundiced or pale. Neurological:      General: No focal deficit present. Mental Status: She is alert. Psychiatric:         Mood and Affect: Affect normal. Mood is depressed. Behavior: Behavior is slowed. Cognition and Memory: Cognition is impaired.        Signed:  Catrachita Couch MD

## 2023-03-09 NOTE — DISCHARGE INSTRUCTIONS
DISCHARGE SUMMARY from Nurse    PATIENT INSTRUCTIONS:    *  Please give a list of your current medications to your Primary Care Provider. *  Please update this list whenever your medications are discontinued, doses are      changed, or new medications (including over-the-counter products) are added. *  Please carry medication information at all times in case of emergency situations. These are general instructions for a healthy lifestyle:    No smoking/ No tobacco products/ Avoid exposure to second hand smoke  Surgeon General's Warning:  Quitting smoking now greatly reduces serious risk to your health. Obesity, smoking, and sedentary lifestyle greatly increases your risk for illness    A healthy diet, regular physical exercise & weight monitoring are important for maintaining a healthy lifestyle    You may be retaining fluid if you have a history of heart failure or if you experience any of the following symptoms:  Weight gain of 3 pounds or more overnight or 5 pounds in a week, increased swelling in our hands or feet or shortness of breath while lying flat in bed. Please call your doctor as soon as you notice any of these symptoms; do not wait until your next office visit. The discharge information has been reviewed with the caregiver. The caregiver verbalized understanding. Discharge medications reviewed with the caregiver and appropriate educational materials and side effects teaching were provided. ___________________________________________________________________________________________________________________________________     Learning About Respiratory Failure  What is respiratory failure? Respiratory failure happens when a person's lungs can't get enough oxygen to the blood. This is a severe problem that may need to be treated in intensive care. Many organs such as the eyes, the brain, and the heart depend on a steady supply of oxygen they get from the blood.  The doctor will try to get enough oxygen to those organs to keep them healthy. Many things can cause lung failure. They include pneumonia and other serious infections. The doctor will look for the cause of the problem and then treat it if possible. How is it treated? To help your lungs get enough oxygen, your doctor may use a few devices. These vary in how much oxygen they give and how they help you breathe. They are:  A nasal cannula (say \"JOE-alondrah-verona\"). This is a thin tube with two prongs that fit just inside your nose. Or you may get a face mask. A special face mask that delivers more oxygen. There are different kinds. A face mask with a bag on one end is called a non-rebreather mask. A high-flow nasal cannula. It can warm and wet the oxygen it delivers, so getting high amounts of oxygen feels better. A face mask that gives you oxygen through a bilevel positive airway pressure (BPAP) machine. You may also hear this called BiPAP. It uses different air pressures when you breathe in and out. A ventilator that helps you breathe or that breathes for you. It controls how much air and oxygen flow into your lungs. This machine requires a breathing tube in your windpipe. It can be uncomfortable, so you may get medicine to help you relax or sleep. You also will get fluid through an intravenous (IV) tube. You will get regular tests to see how much oxygen is in your blood. Tests also can show how well the lungs are working. These tests help your doctor adjust the machines and the oxygen supply. The doctor will watch you closely. Current as of: March 9, 2022               Content Version: 13.5  © 2006-2022 [employer]. Care instructions adapted under license by [facility]Transfer [facility] ([facility]). If you have questions about a medical condition or this instruction, always ask your healthcare professional. [address] any warranty or liability for your use of this information.

## 2023-03-09 NOTE — PROGRESS NOTES
ACUTE OCCUPATIONAL THERAPY GOALS:   (Developed with and agreed upon by patient and/or caregiver.)  1. Patient will perform grooming with SPV and adaptive equipment as needed. PROGRESSING 3/9/23   2. Patient will perform upper body dressing with SPV and adaptive equipment as needed. 3. Patient will perform lower body dressing with SPV and adaptive equipment as needed. PROGRESSING 3/9/23   4. Patient will perform bathing with MIN A and adaptive equipment as needed. 5. Patient will perform toileting and toilet transfer with SBA and adaptive equipment as needed. PROGRESSING 3/9/23   6. Patient will perform ADL functional mobility and tranfers in room with SPV and adaptive equipment as needed. PROGRESSING 3/9/23   7. Patient/family to demonstrate knowledge of home safety and DME recommendations.      Timeframe: 7 visits    OCCUPATIONAL THERAPY: Daily Note AM   OT Visit Days: 2   Time In/Out  OT Charge Capture  Rehab Caseload Tracker  OT Orders    Hannah Kiran is a 80 y.o. female   PRIMARY DIAGNOSIS: Acute hypoxemic respiratory failure ([address]  Confusion [R41.0]  Generalized weakness [R53.1]  Sepsis ([address] [A41.9]  Pneumonia of left lower lobe due to infectious organism [J18.9]  Cerebrovascular accident (CVA), unspecified mechanism ([address] [I63.9]  Dementia without behavioral disturbance, psychotic disturbance, mood disturbance, or anxiety, unspecified dementia severity, unspecified dementia type ([address] [F03.90]       Inpatient: Payor: MEDICARE / Plan: MEDICARE PART A AND B / Product Type: *No Product type* /     ASSESSMENT:     REHAB RECOMMENDATIONS: CURRENT LEVEL OF FUNCTION:  (Most Recently Demonstrated)   Recommendation to date pending progress:  Setting:  Short-term Rehab vs [facility]    Equipment:    To Be Determined Bathing:  Not Tested  Dressing:  Minimal Assist  Feeding/Grooming:  Contact Guard Assist  Toileting:  Minimal Assist  Functional Mobility:  Minimal Assist-Contact Guard Assist ASSESSMENT:  Ms. Jarred Lemus presents supine in bed and agreeable to therapy session. She performed bed mobility, LB dressing, household mobility, toileting, grooming and to recliner with assistance as charted below. Pt continues with confusion and certainly poor short term memory. Continue per OT POC.        SUBJECTIVE:     Ms. Jarred Lemus states she will get up     Social/Functional Lives With: Spouse  Type of Home: House  Home Layout: Two level, Able to Live on Main level with bedroom/bathroom  Home Access: Stairs to enter with rails  Entrance Stairs - Number of Steps: 3  Entrance Stairs - Rails: Both  Bathroom Shower/Tub: Walk-in shower  Bathroom Toilet: Standard  Receives Help From: Family  ADL Assistance: Needs assistance  Ambulation Assistance: Independent  Transfer Assistance: Independent    OBJECTIVE:     Nereyda Pratt / Mich Singh / Miguel Angel Ball:  O2 via NC    RESTRICTIONS/PRECAUTIONS:  Restrictions/Precautions  Restrictions/Precautions: Fall Risk    PAIN: Erla Maria Teresa / O2:   Pre Treatment:    0/10      Post Treatment: no c/o pain during session Vitals          Oxygen        MOBILITY: I Mod I S SBA CGA Min Mod Max Total  NT x2 Comments:   Bed Mobility    Rolling [] [] [] [] [] [] [] [] [] [] []    Supine to Sit [] [] [] [x] [] [] [] [] [] [] []    Scooting [] [] [] [x] [] [] [] [] [] [] []    Sit to Supine [] [] [] [x] [] [] [] [] [] [] []    Transfers    Sit to Stand [] [] [] [] [x] [] [] [] [] [] []    Bed to Chair [] [] [] [] [] [] [] [] [] [] []    Stand to Sit [] [] [] [] [x] [] [] [] [] [] []    Tub/Shower [] [] [] [] [] [] [] [] [] [] []     Toilet [] [] [] [] [x] [x] [] [] [] [] []      [] [] [] [] [] [] [] [] [] [] []    I=Independent, Mod I=Modified Independent, S=Supervision/Setup, SBA=Standby Assistance, CGA=Contact Guard Assistance, Min=Minimal Assistance, Mod=Moderate Assistance, Max=Maximal Assistance, Total=Total Assistance, NT=Not Tested    ACTIVITIES OF DAILY LIVING: I Mod I S SBA CGA Min Mod Max Total NT Comments   BASIC ADLs:              Upper Body Bathing [] [] [] [] [] [] [] [] [] []    Lower Body Bathing [] [] [] [] [] [] [] [] [] []    Toileting [] [] [] [] [] [x] [] [] [] []    Upper Body Dressing [] [] [] [] [] [] [] [] [] []    Lower Body Dressing [] [] [] [] [x] [] [] [] [] []    Feeding [] [] [] [] [] [] [] [] [] []    Grooming [] [] [] [] [x] [] [] [] [] []    Personal Device Care [] [] [] [] [] [] [] [] [] []    Functional Mobility [] [] [] [] [] [x] [] [] [] []    I=Independent, Mod I=Modified Independent, S=Supervision/Setup, SBA=Standby Assistance, CGA=Contact Guard Assistance, Min=Minimal Assistance, Mod=Moderate Assistance, Max=Maximal Assistance, Total=Total Assistance, NT=Not Tested    BALANCE: Good Fair+ Fair Fair- Poor NT Comments   Sitting Static [x] [] [] [] [] []    Sitting Dynamic [x] [x] [] [] [] []              Standing Static [] [x] [x] [] [] []    Standing Dynamic [] [] [x] [] [] []        PLAN:     FREQUENCY/DURATION   OT Plan of Care: 3 times/week for duration of hospital stay or until stated goals are met, whichever comes first.    TREATMENT:     TREATMENT:   Co-Treatment PT/OT necessary due to patient's decreased overall endurance/tolerance levels, as well as need for high level skilled assistance to complete functional transfers/mobility and functional tasks  Self Care (25 minutes): Patient participated in toileting, lower body dressing, and grooming ADLs in unsupported sitting and standing with minimal verbal, manual, and tactile cueing to increase independence, increase activity tolerance, and increase safety awareness. Patient also participated in functional mobility and functional transfer training to increase independence, increase activity tolerance, and increase safety awareness.     TREATMENT GRID:  N/A    AFTER TREATMENT PRECAUTIONS: Bed, Bed/Chair Locked, Call light within reach, Needs within reach, RN notified, and sitter at bedside    INTERDISCIPLINARY COLLABORATION:  RN/ PCT  and PT/ PTA    EDUCATION:       TOTAL TREATMENT DURATION AND TIME:  Time In: 1120  Time Out: [facility]  Minutes: PETRONA Villarreal

## 2023-03-09 NOTE — PROGRESS NOTES
ACUTE PHYSICAL THERAPY GOALS:   (Developed with and agreed upon by patient and/or caregiver. )    LTG:  (1.)Ms. Dylan Khan will move from supine to sit and sit to supine  in bed with SUPERVISION within 7 treatment day(s). (2.)Ms. Dylan Khan will transfer from bed to chair and chair to bed with SUPERVISION using the least restrictive device within 7 treatment day(s). (3.)Ms. Silva will ambulate with SUPERVISION for 250 feet with the least restrictive device within 7 treatment day(s). ________________________________________________________________________________________________     PHYSICAL THERAPY Daily Note and AM  (Link to Caseload Tracking: PT Visit Days : 1  Acknowledge Orders  Time In/Out  PT Charge Capture  Rehab Caseload Tracker    Gavin Abernathy is a 80 y.o. female   PRIMARY DIAGNOSIS: Acute hypoxemic respiratory failure ([address]  Confusion [R41.0]  Generalized weakness [R53.1]  Sepsis ([address] [A41.9]  Pneumonia of left lower lobe due to infectious organism [J18.9]  Cerebrovascular accident (CVA), unspecified mechanism ([address] [I63.9]  Dementia without behavioral disturbance, psychotic disturbance, mood disturbance, or anxiety, unspecified dementia severity, unspecified dementia type ([address] [F03.90]       Reason for Referral: Generalized Muscle Weakness (M62.81)  Other abnormalities of gait and mobility (R26.89)  Inpatient: Payor: MEDICARE / Plan: MEDICARE PART A AND B / Product Type: *No Product type* /     ASSESSMENT:     REHAB RECOMMENDATIONS:   Recommendation to date pending progress:  Setting:  Short-term Rehab    Equipment:    Rolling Walker     ASSESSMENT:  Ms. Dylan Khan presents with somewhat limited functional mobility from her baseline. She apparently can transfer and ambulate without assist or without assistive device at baseline and has 24/7 sitters. She will benefit from PT to increase her independence with mobility and plans to return home with sitter/family support.  She was supine on contact and agreeable to therapy.   3/9- patient going to STR today. Supine on contact and agreeable to therapy. She transferred supine to sit with SBA and then stood with CGA and ambulated 15 ft to and from the bathroom with HHA. Worked on standing balance during ADL with OT. Returned and transferred into the recliner due to her back hurting and needing to spend time OOB. Left up with sitter present.      [facility]™ “6 Clicks” Basic Mobility Inpatient Short Form  AM-PAC Basic Mobility - Inpatient   How much help is needed turning from your back to your side while in a flat bed without using bedrails?: A Little  How much help is needed moving from lying on your back to sitting on the side of a flat bed without using bedrails?: A Little  How much help is needed moving to and from a bed to a chair?: A Little  How much help is needed standing up from a chair using your arms?: A Little  How much help is needed walking in hospital room?: A Little  How much help is needed climbing 3-5 steps with a railing?: A Little  [facility] Inpatient Mobility Raw Score : 18  AM-PAC Inpatient T-Scale Score : 43.63  Mobility Inpatient CMS 0-100% Score: 46.58  Mobility Inpatient CMS G-Code Modifier : CK    SUBJECTIVE:   Ms. Silva was agreeable to therapy and pleasantly confused.     Social/Functional Lives With: Spouse  Type of Home: House  Home Layout: Two level, Able to Live on Main level with bedroom/bathroom  Home Access: Stairs to enter with rails  Entrance Stairs - Number of Steps: 3  Entrance Stairs - Rails: Both  Bathroom Shower/Tub: Walk-in shower  Bathroom Toilet: Standard  Receives Help From: Family  ADL Assistance: Needs assistance  Ambulation Assistance: Independent  Transfer Assistance: Independent    OBJECTIVE:     PAIN: VITALS / O2: PRECAUTION / LINES / DRAINS:   Pre Treatment:          Post Treatment: 0 Vitals        Oxygen      None    RESTRICTIONS/PRECAUTIONS:  Restrictions/Precautions: Fall Risk          GROSS EVALUATION: Intact Impaired (Comments):   AROM [x]     PROM []    Strength [x]     Balance []   fair   Posture [] N/A   Sensation [x]     Coordination [x]      Tone [x]     Edema []    Activity Tolerance []      []      COGNITION/  PERCEPTION: Intact Impaired (Comments):   Orientation [x]     Vision [x]     Hearing [x]     Cognition  [x]       MOBILITY: I Mod I S SBA CGA Min Mod Max Total  NT x2 Comments:   Bed Mobility    Rolling [] [] [] [] [] [] [] [] [] [] []    Supine to Sit [] [] [] [x] [] [] [] [] [] [] []    Scooting [] [] [] [x] [] [] [] [] [] [] []    Sit to Supine [] [] [] [x] [] [] [] [] [] [] []    Transfers    Sit to Stand [] [] [] [] [x] [] [] [] [] [] []    Bed to Chair [] [] [] [] [x] [] [] [] [] [] []    Stand to Sit [] [] [] [] [x] [] [] [] [] [] []     [] [] [] [] [] [] [] [] [] [] []    I=Independent, Mod I=Modified Independent, S=Supervision, SBA=Standby Assistance, CGA=Contact Guard Assistance,   Min=Minimal Assistance, Mod=Moderate Assistance, Max=Maximal Assistance, Total=Total Assistance, NT=Not Tested    GAIT: I Mod I S SBA CGA Min Mod Max Total  NT x2 Comments:   Level of Assistance [] [] [] [] [x] [] [] [] [] []  Staying in room for gait    Distance 30    DME HHA    Gait Quality Decreased step length, decreased dilip, decreased step clearance, and path deviations    Weightbearing Status Fall risk    Stairs      I=Independent, Mod I=Modified Independent, S=Supervision, SBA=Standby Assistance, CGA=Contact Guard Assistance,   Min=Minimal Assistance, Mod=Moderate Assistance, Max=Maximal Assistance, Total=Total Assistance, NT=Not Tested    PLAN:   FREQUENCY AND DURATION: Daily for duration of hospital stay or until stated goals are met, whichever comes first.    THERAPY PROGNOSIS: Good    PROBLEM LIST:   (Skilled intervention is medically necessary to address:)  Decreased ADL/Functional Activities  Decreased Activity Tolerance  Decreased Balance  Decreased Gait Ability  Decreased Safety Awareness  Decreased Strength  Decreased Transfer Abilities INTERVENTIONS PLANNED:   (Benefits and precautions of physical therapy have been discussed with the patient.)  Therapeutic Activity  Therapeutic Exercise/HEP  Gait Training  Manual Therapy  Education       TREATMENT:       TREATMENT:   Therapeutic Activity (25 Minutes): Therapeutic activity included Rolling, Supine to Sit, Scooting, Transfer Training, Ambulation on level ground, Sitting balance , and Standing balance to improve functional Activity tolerance, Balance, Coordination, Mobility, and Strength. TREATMENT GRID:  N/A    AFTER TREATMENT PRECAUTIONS: Bed/Chair Locked, Call light within reach, Chair, Needs within reach, and sitter at bedside .      INTERDISCIPLINARY COLLABORATION:  RN/ PCT, PT/ PTA, and OT/ [place]    EDUCATION:      TIME IN/OUT:  Time In: 1115  Time Out: [address]  Minutes: [address] PT

## 2023-03-10 ENCOUNTER — CARE COORDINATION (OUTPATIENT)
Dept: CARE COORDINATION | Facility: CLINIC | Age: 83
End: 2023-03-10

## 2023-03-10 LAB
BACTERIA SPEC CULT: NORMAL
GRAM STN SPEC: NORMAL
GRAM STN SPEC: NORMAL
SERVICE CMNT-IMP: NORMAL

## 2023-03-10 NOTE — CARE COORDINATION
No COURTNEY call indicated at this time due to patient being discharged to a SNF preferred provider network ([facility]) for STR. SNF RN Coordinator currently following.

## 2023-03-16 ENCOUNTER — CARE COORDINATION (OUTPATIENT)
Dept: CARE COORDINATION | Facility: CLINIC | Age: 83
End: 2023-03-16

## 2023-03-16 NOTE — CARE COORDINATION
Post Acute Facility Update    Care Transitions Post Acute Facility Update    Care Transitions Interventions  Post Acute Facility Update  Reported Nursing Issues: received Prevnar 20 vaccine on 3/14. No adverse reactions at this time. V/S stable. Adult reg.  Diet - easy to chew   ADLs: Moderate Assistance   Bed Mobility: Contact Guard Assist - Hands on patient for balance   Transfer Assistance: Contact Guard Assist - Hands on patient for balance   Ambulation Assistance: Contact Guard Assist - Hands on patient for balance   How far (in feet) is the patient ambulating?: 150   Does patient use an assistive device?: No   Barriers to Discharge: Bed offer at [address] [facility] [address] at [address] [facility]   Anticipated date for discharge: 3/23/23    Anticipated discharge services: [facility] at discharge and [facility]              Next IDT Planned Review: 3/22/23    Future Appointments   Date Time Provider Gerald Sun   5/26/2023 11:45 AM Sarita Montoya MD [facility] AM[facility]   8/1/2023  1:30 PM DO ZEHRA Alves [facility] AMB         Emmett Draper RN

## 2023-03-21 ENCOUNTER — TELEPHONE (OUTPATIENT)
Dept: NEUROLOGY | Age: 83
End: 2023-03-21

## 2023-03-21 NOTE — TELEPHONE ENCOUNTER
[facility] needs [facility] needs additional information. I printed the hospital discharge and last ov. Can you please look over this so we can get Dr Laura Mckeon to sign next week? Paper work in your folder.

## 2023-03-22 ENCOUNTER — CARE COORDINATION (OUTPATIENT)
Dept: CARE COORDINATION | Facility: CLINIC | Age: 83
End: 2023-03-22

## 2023-03-22 NOTE — CARE COORDINATION
Post Acute Facility Update    Care Transitions Post Acute Facility Update    Care Transitions Interventions  Post Acute Facility Update  Reported Nursing Issues: No Nursing concerns. V/S remain stable, no issues. ADLs: Moderate Assistance (Comment: UE MIN A)   Bed Mobility: Minimal Assistance   Transfer Assistance: Minimal Assistance   Ambulation Assistance: [address] on patient for balance   How far (in feet) is the patient ambulating?: 150   Does patient use an assistive device?:  (Comment: HHA)   Barriers to Discharge: [facility], Care Team Friday. 3/31 Room will be available for patient and spouse.      Anticipated date for discharge: 3/31/23    Anticipated discharge services: [facility] and [facility] services              Next IDT Planned Review: 3/29/23    Future Appointments   Date Time Provider Gerald Sun   5/26/2023 11:45 AM Kristi Barnes MD [facility]   8/1/2023  1:30 PM DO ZEHRA Goetz [facility] AMB           Sandor Watters, RN

## 2023-03-30 ENCOUNTER — CARE COORDINATION (OUTPATIENT)
Dept: CARE COORDINATION | Facility: CLINIC | Age: 83
End: 2023-03-30

## 2023-03-30 NOTE — CARE COORDINATION
Post Acute Facility Update    Care Transitions Post Acute Facility Update    Care Transitions Interventions  Post Acute Facility Update  Reported Nursing Issues: ReContinues on Warfarin and Labs for PT/INR draws. [facility]. Moving in on April 2nd. No nursing concerns. V/S remain stable. ADLs: Minimal Assistance   Bed Mobility: Contact Guard Assist - Hands on patient for balance   Transfer Assistance: Contact Guard Assist - Hands on patient for balance   Ambulation Assistance: Contact Guard Assist - Hands on patient for balance   How far (in feet) is the patient ambulating?: 175   Does patient use an assistive device?: No   Barriers to Discharge: N/A   Anticipated date for discharge: 4/2/23    Anticipated discharge services: [facility]. Moving in on April 2nd  Interim [facility] services              Next IDT Planned Review: 4/3/23 [facility] Appointments   Date Time Provider Gerald Sun   5/26/2023 11:45 AM MD ARGENTINA LermaDG [facility] AMB   8/1/2023  1:30 PM Angel Farooq DO [facility] AM[facility]     [facility]. Moving in on April 2nd. Interim  services.     Aida Faith RN

## 2023-04-04 ENCOUNTER — CARE COORDINATION (OUTPATIENT)
Dept: CARE COORDINATION | Facility: CLINIC | Age: 83
End: 2023-04-04

## 2023-04-04 NOTE — CARE COORDINATION
Care Transitions Outreach Attempt    Call within 2 business days of discharge: Yes   Attempted to reach patient for transitions of care follow up. Unable to reach patient. Patient: Katharina Barreto Patient : 1940 MRN: [id number]    Last Discharge  Street       Date Complaint Diagnosis Description Type Department Provider    3/2/23 Facial Droop Pleural effusion . .. ED to Hosp-Admission (Discharged) (ADMITTED) Irma Tyson MD; Edna Piña. .. Was this an external facility discharge?  Yes, 4/3/23  Discharge Facility: [facility]    Noted following upcoming appointments from discharge chart review:   [facility] follow up appointment(s):   Future Appointments   Date Time Provider Gerald Sun   2023 11:45 AM Fam Mckeon MD [facility] AM[facility]   2023  1:30 PM Malaika Cervantes DO [facility] AMB

## 2023-04-05 ENCOUNTER — CARE COORDINATION (OUTPATIENT)
Dept: CARE COORDINATION | Facility: CLINIC | Age: 83
End: 2023-04-05

## 2023-04-05 NOTE — CARE COORDINATION
COURTNEY #2 outreach. Patient answered and stated she was at a doctors appointment with her  and would call me back.

## 2023-05-26 ENCOUNTER — OFFICE VISIT (OUTPATIENT)
Age: 83
End: 2023-05-26
Payer: MEDICARE

## 2023-05-26 VITALS
DIASTOLIC BLOOD PRESSURE: 64 MMHG | WEIGHT: 87 LBS | SYSTOLIC BLOOD PRESSURE: 110 MMHG | HEART RATE: 60 BPM | BODY MASS INDEX: 18.26 KG/M2 | HEIGHT: 58 IN

## 2023-05-26 DIAGNOSIS — I10 PRIMARY HYPERTENSION: Chronic | ICD-10-CM

## 2023-05-26 DIAGNOSIS — E78.2 MIXED HYPERLIPIDEMIA: ICD-10-CM

## 2023-05-26 DIAGNOSIS — G30.9 ALZHEIMER'S DISEASE, UNSPECIFIED (CODE) (HCC): ICD-10-CM

## 2023-05-26 DIAGNOSIS — I48.0 PAROXYSMAL ATRIAL FIBRILLATION (HCC): Primary | ICD-10-CM

## 2023-05-26 PROBLEM — I50.31 ACUTE DIASTOLIC (CONGESTIVE) HEART FAILURE (HCC): Status: RESOLVED | Noted: 2023-03-06 | Resolved: 2023-05-26

## 2023-05-26 PROCEDURE — G8419 CALC BMI OUT NRM PARAM NOF/U: HCPCS | Performed by: INTERNAL MEDICINE

## 2023-05-26 PROCEDURE — G8427 DOCREV CUR MEDS BY ELIG CLIN: HCPCS | Performed by: INTERNAL MEDICINE

## 2023-05-26 PROCEDURE — 3078F DIAST BP <80 MM HG: CPT | Performed by: INTERNAL MEDICINE

## 2023-05-26 PROCEDURE — 3074F SYST BP LT 130 MM HG: CPT | Performed by: INTERNAL MEDICINE

## 2023-05-26 PROCEDURE — 1090F PRES/ABSN URINE INCON ASSESS: CPT | Performed by: INTERNAL MEDICINE

## 2023-05-26 PROCEDURE — G8400 PT W/DXA NO RESULTS DOC: HCPCS | Performed by: INTERNAL MEDICINE

## 2023-05-26 PROCEDURE — 99214 OFFICE O/P EST MOD 30 MIN: CPT | Performed by: INTERNAL MEDICINE

## 2023-05-26 PROCEDURE — 1036F TOBACCO NON-USER: CPT | Performed by: INTERNAL MEDICINE

## 2023-05-26 PROCEDURE — 1123F ACP DISCUSS/DSCN MKR DOCD: CPT | Performed by: INTERNAL MEDICINE

## 2023-05-26 RX ORDER — MULTIVIT-MIN/IRON/FOLIC ACID/K 18-600-40
CAPSULE ORAL
COMMUNITY

## 2023-05-26 ASSESSMENT — ENCOUNTER SYMPTOMS: SHORTNESS OF BREATH: 0

## 2023-05-26 NOTE — PROGRESS NOTES
3    QUEtiapine (SEROQUEL) 25 MG tablet, Take 1 tablet by mouth at bedtime, Disp: 90 tablet, Rfl: 1    cyanocobalamin 1000 MCG tablet, Take 1 tablet by mouth daily, Disp: , Rfl:     Levocetirizine Dihydrochloride (XYZAL ALLERGY 24HR PO), Take by mouth daily (Patient not taking: Reported on 5/26/2023), Disp: , Rfl:      No Known Allergies     Patient Active Problem List    Diagnosis Date Noted    Alzheimer's disease, unspecified 05/26/2023     Priority: High    Dementia with behavioral disturbance ([facility] 03/02/2023     Priority: Medium    Dysphagia 03/02/2023     Priority: Medium    Dizziness 06/19/2020     Priority: Low     1. Pre-syncope 3/3/20:  Negative ER evaluation. Mixed hyperlipidemia 03/01/2019     Priority: Low    Primary hypertension 02/04/2019     Priority: Low    Paroxysmal atrial fibrillation ([address] 02/04/2019     Priority: Low     1. Documented on 2/4/19 at PCP office on EKG. Started on Xarelto and   Toprol increased. 2.  Echo (2/22/19): EF 60-65%. Severe LAE. Moderate JOHNATHON. Moderate TR.    RVSP 38. Depression 09/26/2018     Priority: Low    Moderate major depression ([facility] 09/26/2018     Priority: Low        Social History     Tobacco Use    Smoking status: Never    Smokeless tobacco: Never   Substance Use Topics    Alcohol use: Never       ROS:    Review of Systems   Constitutional: Negative for malaise/fatigue. Cardiovascular:  Negative for chest pain. Respiratory:  Negative for shortness of breath. Musculoskeletal:  Positive for arthritis. Neurological:  Negative for focal weakness. Psychiatric/Behavioral:  Positive for memory loss. Negative for depression.           PHYSICAL EXAM:  Wt Readings from Last 3 Encounters:   05/26/23 87 lb (39.5 kg)   03/04/23 98 lb (44.5 kg)   12/16/22 88 lb (39.9 kg)     BP Readings from Last 3 Encounters:   05/26/23 110/64   03/09/23 111/62   12/16/22 (!) 140/80     Pulse Readings from Last 3 Encounters:   05/26/23 60   03/09/23 70

## 2023-05-31 ENCOUNTER — APPOINTMENT (OUTPATIENT)
Dept: GENERAL RADIOLOGY | Age: 83
End: 2023-05-31
Payer: MEDICARE

## 2023-05-31 ENCOUNTER — HOSPITAL ENCOUNTER (EMERGENCY)
Age: 83
Discharge: HOME | End: 2023-05-31
Attending: EMERGENCY MEDICINE
Payer: MEDICARE

## 2023-05-31 VITALS
DIASTOLIC BLOOD PRESSURE: 69 MMHG | TEMPERATURE: 97.5 F | SYSTOLIC BLOOD PRESSURE: 136 MMHG | OXYGEN SATURATION: 96 % | HEART RATE: 72 BPM | BODY MASS INDEX: 18.26 KG/M2 | RESPIRATION RATE: 17 BRPM | WEIGHT: 87 LBS | HEIGHT: 58 IN

## 2023-05-31 DIAGNOSIS — J18.9 PNEUMONIA OF RIGHT MIDDLE LOBE DUE TO INFECTIOUS ORGANISM: Primary | ICD-10-CM

## 2023-05-31 PROCEDURE — 71046 X-RAY EXAM CHEST 2 VIEWS: CPT

## 2023-05-31 PROCEDURE — 99284 EMERGENCY DEPT VISIT MOD MDM: CPT

## 2023-05-31 RX ORDER — LEVOFLOXACIN 500 MG/1
500 TABLET, FILM COATED ORAL DAILY
Qty: 10 TABLET | Refills: 0 | Status: SHIPPED | OUTPATIENT
Start: 2023-05-31 | End: 2023-06-10

## 2023-05-31 ASSESSMENT — ENCOUNTER SYMPTOMS: FACIAL SWELLING: 1

## 2023-05-31 ASSESSMENT — PAIN - FUNCTIONAL ASSESSMENT: PAIN_FUNCTIONAL_ASSESSMENT: NONE - DENIES PAIN

## 2023-05-31 NOTE — ED NOTES
Report and pt care transferred to [facility], [address] [facility] [address] [place] [address], RN  05/31/23 [id number]

## 2023-05-31 NOTE — ED PROVIDER NOTES
Emergency Department Provider Note       PCP: None None   Age: 80 y.o. Sex: female     DISPOSITION Decision To Discharge 05/31/2023 02:37:46 PM       ICD-10-CM    1. Pneumonia of right middle lobe due to infectious organism  J18.9           Medical Decision Making     Complexity of Problems Addressed:  1 or more acute illnesses that pose a threat to life or bodily function. Data Reviewed and Analyzed:  Category 1:   I independently ordered and reviewed each unique test.     The patients assessment required an independent historian: Daughter. The reason they were needed is dementia. Category 2:   I interpreted the X-rays right lobe infiltrate. EKG  Rate of 53  Atrial fibrillation  No ST elevation    Category 3: Discussion of management or test interpretation. DDX:    Conjunctivitis, orbital cellulitis, periorbital cellulitis, globe rupture, corneal abrasion, iritis, uveitis,     Pneumonia, sinusitis, aspiration,        Risk of Complications and/or Morbidity of Patient Management:  Prescription drug management performed. Shared medical decision making was utilized in creating the patients health plan today. ED Course as of 05/31/23 1440   Wed May 31, 2023   1414 XR CHEST (2 VW)  IMPRESSION:  1.  New right middle lobe infiltrate. 2.  Resolution of prior left pleural effusion. [AC]   9938 Right middle lobe infiltrate present. Patient's vital signs are stable I feel as though she would do well with oral antibiotics. Case was discussed with her family and they are agreeable with this plan as well. [FV]   1712 I talked to the patient her  and daughter and they are agreeable to just placing the patient on antibiotics for the right lobe infiltrate to treat what we suspected aspiration pneumonia. Return criteria discussed with the family.  [KH]      ED Course User Index  [KH] Abdulkadir Salinas, DO       History      Shruthi Martinez is a 80 y.o. female who presents to the Emergency Department with

## 2023-05-31 NOTE — ED TRIAGE NOTES
Pt arrives via EMS w/ cc of swelling in R eye. Pt states that she took mucinex this morning and noticed the swelling. Pt complaining of \"feeling sick\". No other complaints at this time. Denies chest pain, N/V/D and Abd painHx of dementia.  Pt A&O x 2

## 2023-05-31 NOTE — DISCHARGE INSTRUCTIONS
Take the antibiotic once daily as prescribed. Make sure she is staying well-hydrated and not eating at least 1-1/2 to 2 hours before going to bed or laying down. If she starts developing new or worsening symptoms she may need to be reevaluated quickly. Please bring her back to the ER if this occurs.

## 2023-05-31 NOTE — ED NOTES
I have reviewed discharge instructions with the patient. The patient verbalized understanding. Patient left ED via Discharge Method: ambulatory to Home with family. Opportunity for questions and clarification provided. Patient given 1 scripts. To continue your aftercare when you leave the hospital, you may receive an automated call from our care team to check in on how you are doing. This is a free service and part of our promise to provide the best care and service to meet your aftercare needs.  If you have questions, or wish to unsubscribe from this service please call [phone]. Thank you for Choosing our [facility] Emergency Department.          Ag Ford RN  05/31/23 [id number]

## 2023-06-05 ENCOUNTER — TELEPHONE (OUTPATIENT)
Age: 83
End: 2023-06-05

## 2023-06-05 DIAGNOSIS — J18.9 PNEUMONIA OF RIGHT MIDDLE LOBE DUE TO INFECTIOUS ORGANISM: ICD-10-CM

## 2023-06-05 DIAGNOSIS — R60.9 EDEMA, UNSPECIFIED TYPE: ICD-10-CM

## 2023-06-05 DIAGNOSIS — R60.9 EDEMA, UNSPECIFIED TYPE: Primary | ICD-10-CM

## 2023-06-05 LAB
ALBUMIN SERPL-MCNC: 2.7 G/DL (ref 3.2–4.6)
ALBUMIN/GLOB SERPL: 0.9 (ref 0.4–1.6)
ALP SERPL-CCNC: 135 U/L (ref 50–136)
ALT SERPL-CCNC: 49 U/L (ref 12–65)
ANION GAP SERPL CALC-SCNC: 7 MMOL/L (ref 2–11)
AST SERPL-CCNC: 51 U/L (ref 15–37)
BASOPHILS # BLD: 0 K/UL (ref 0–0.2)
BASOPHILS NFR BLD: 0 % (ref 0–2)
BILIRUB DIRECT SERPL-MCNC: 0.2 MG/DL
BILIRUB SERPL-MCNC: 0.3 MG/DL (ref 0.2–1.1)
BUN SERPL-MCNC: 12 MG/DL (ref 8–23)
CALCIUM SERPL-MCNC: 8.9 MG/DL (ref 8.3–10.4)
CHLORIDE SERPL-SCNC: 105 MMOL/L (ref 101–110)
CO2 SERPL-SCNC: 26 MMOL/L (ref 21–32)
CREAT SERPL-MCNC: 0.7 MG/DL (ref 0.6–1)
DIFFERENTIAL METHOD BLD: ABNORMAL
EOSINOPHIL # BLD: 0.1 K/UL (ref 0–0.8)
EOSINOPHIL NFR BLD: 1 % (ref 0.5–7.8)
ERYTHROCYTE [DISTWIDTH] IN BLOOD BY AUTOMATED COUNT: 13.6 % (ref 11.9–14.6)
GLOBULIN SER CALC-MCNC: 3.1 G/DL (ref 2.8–4.5)
GLUCOSE SERPL-MCNC: 93 MG/DL (ref 65–100)
HCT VFR BLD AUTO: 32.4 % (ref 35.8–46.3)
HGB BLD-MCNC: 10.6 G/DL (ref 11.7–15.4)
IMM GRANULOCYTES # BLD AUTO: 0.1 K/UL (ref 0–0.5)
IMM GRANULOCYTES NFR BLD AUTO: 1 % (ref 0–5)
LACTATE SERPL-SCNC: 1.4 MMOL/L (ref 0.4–2)
LYMPHOCYTES # BLD: 1.9 K/UL (ref 0.5–4.6)
LYMPHOCYTES NFR BLD: 15 % (ref 13–44)
MCH RBC QN AUTO: 32.6 PG (ref 26.1–32.9)
MCHC RBC AUTO-ENTMCNC: 32.7 G/DL (ref 31.4–35)
MCV RBC AUTO: 99.7 FL (ref 82–102)
MONOCYTES # BLD: 1.2 K/UL (ref 0.1–1.3)
MONOCYTES NFR BLD: 10 % (ref 4–12)
NEUTS SEG # BLD: 9.2 K/UL (ref 1.7–8.2)
NEUTS SEG NFR BLD: 73 % (ref 43–78)
NRBC # BLD: 0 K/UL (ref 0–0.2)
NT PRO BNP: 1369 PG/ML
PLATELET # BLD AUTO: 312 K/UL (ref 150–450)
PMV BLD AUTO: 10.6 FL (ref 9.4–12.3)
POTASSIUM SERPL-SCNC: 3.7 MMOL/L (ref 3.5–5.1)
PROT SERPL-MCNC: 5.8 G/DL (ref 6.3–8.2)
RBC # BLD AUTO: 3.25 M/UL (ref 4.05–5.2)
SODIUM SERPL-SCNC: 138 MMOL/L (ref 133–143)
WBC # BLD AUTO: 12.7 K/UL (ref 4.3–11.1)

## 2023-06-05 RX ORDER — FUROSEMIDE 40 MG/1
40 TABLET ORAL DAILY
Qty: 5 TABLET | Refills: 3 | Status: SHIPPED | OUTPATIENT
Start: 2023-06-05 | End: 2023-06-06

## 2023-06-05 RX ORDER — POTASSIUM CHLORIDE 20 MEQ/1
20 TABLET, EXTENDED RELEASE ORAL 2 TIMES DAILY
Qty: 4 TABLET | Refills: 3 | Status: SHIPPED | OUTPATIENT
Start: 2023-06-05 | End: 2023-06-06

## 2023-06-06 ENCOUNTER — CLINICAL DOCUMENTATION (OUTPATIENT)
Age: 83
End: 2023-06-06

## 2023-06-06 RX ORDER — FUROSEMIDE 40 MG/1
40 TABLET ORAL DAILY
Qty: 5 TABLET | Refills: 3 | Status: SHIPPED | OUTPATIENT
Start: 2023-06-06

## 2023-06-06 RX ORDER — POTASSIUM CHLORIDE 20 MEQ/1
20 TABLET, EXTENDED RELEASE ORAL 2 TIMES DAILY
Qty: 4 TABLET | Refills: 3 | Status: SHIPPED | OUTPATIENT
Start: 2023-06-06

## 2023-06-08 ENCOUNTER — TELEPHONE (OUTPATIENT)
Dept: PULMONOLOGY | Age: 83
End: 2023-06-08

## 2023-06-08 DIAGNOSIS — R13.10 DYSPHAGIA, UNSPECIFIED TYPE: Primary | ICD-10-CM

## 2023-06-08 NOTE — TELEPHONE ENCOUNTER
Having aspiration and now pna. Having worsening dementia.  Will get MBS and spoke with speech and will see tomorrow at 2:45 PM    Tasia Ford MD

## 2023-06-09 ENCOUNTER — HOSPITAL ENCOUNTER (OUTPATIENT)
Dept: GENERAL RADIOLOGY | Age: 83
End: 2023-06-09
Attending: INTERNAL MEDICINE
Payer: MEDICARE

## 2023-06-09 DIAGNOSIS — R13.10 DYSPHAGIA, UNSPECIFIED TYPE: ICD-10-CM

## 2023-06-09 PROCEDURE — 92611 MOTION FLUOROSCOPY/SWALLOW: CPT

## 2023-06-09 PROCEDURE — 2500000003 HC RX 250 WO HCPCS: Performed by: INTERNAL MEDICINE

## 2023-06-09 PROCEDURE — 74230 X-RAY XM SWLNG FUNCJ C+: CPT

## 2023-06-09 RX ADMIN — BARIUM SULFATE 15 ML: 400 PASTE ORAL at 14:54

## 2023-06-09 RX ADMIN — BARIUM SULFATE 45 ML: 0.81 POWDER, FOR SUSPENSION ORAL at 14:54

## 2023-06-09 NOTE — THERAPY EVALUATION
lateral plane. Oral Motor Assessment  Labial: No impairment  Lingual: No impairment  Oral Hygiene: adequate  Vocal quality: WFL  Volitional cough: present  Volitional swallow: present     PO trials  Patient was presented with trials of thin liquids, pudding, mixed consistency, and cracker. Oral phase:  reduced posterior propulsion skills  piecemeal deglutition    Pharyngeal phase:  Swallows of thin liquids were timely. There was intermittent and flash laryngeal penetration observed during the swallow from serial straw sips that was cleared spontaneously during the swallow. No aspiration was observed. No pharyngeal residue after swallow. Swallows of pudding were piecemealed. No laryngeal penetration or aspiration was observed. No pharyngeal residue after swallow. Swallows of mixed consistencies were timely and piecemealed. No laryngeal penetration or aspiration was observed. No pharyngeal residue after swallow. Swallows of cracker were delayed, triggered at valleculae, and with pooling in valleculae for 4 seconds prior to swallow initiation. No laryngeal penetration or aspiration was observed. No pharyngeal residue after swallow. Pharyngeal characteristics:  functional pharyngeal swallow  Aspiration/Penetration Scale: Thin liquids: 2 (Penetration/no residue. Contrast enters the larynx, remains above the folds/cords, and is cleared.)  Mildly thick liquids: Not assessed. Moderately thick liquids: Not assessed. Puddin (No penetration/aspiration. Contrast does not enter the airway.)  Mixed consistency: 1 (No penetration/aspiration. Contrast does not enter the airway.)  Cracker: 1 (No penetration/aspiration. Contrast does not enter the airway.)    Upper Esophageal Screen:   adequate and timely clearance of all boluses through cervical esophagus  *Distal esophagus not assessed due to limitations of modified barium swallow study.      National Outcomes Measure:   SWALLOWING: Level 5:  Swallowing is safe

## 2023-06-18 NOTE — PLAN OF CARE
Problem: Discharge Planning  Goal: Discharge to home or other facility with appropriate resources  Outcome: Progressing     Problem: Pain  Goal: Verbalizes/displays adequate comfort level or baseline comfort level  Outcome: Progressing     Problem: Safety - Adult  Goal: Free from fall injury  Outcome: Progressing complains of pain/discomfort

## 2023-08-01 ENCOUNTER — OFFICE VISIT (OUTPATIENT)
Dept: NEUROLOGY | Age: 83
End: 2023-08-01
Payer: MEDICARE

## 2023-08-01 VITALS
WEIGHT: 92.4 LBS | OXYGEN SATURATION: 96 % | SYSTOLIC BLOOD PRESSURE: 136 MMHG | HEART RATE: 67 BPM | BODY MASS INDEX: 19.39 KG/M2 | HEIGHT: 58 IN | DIASTOLIC BLOOD PRESSURE: 74 MMHG

## 2023-08-01 DIAGNOSIS — G30.9 ALZHEIMER DISEASE (HCC): Primary | ICD-10-CM

## 2023-08-01 DIAGNOSIS — F02.80 ALZHEIMER DISEASE (HCC): Primary | ICD-10-CM

## 2023-08-01 PROBLEM — R42 DIZZINESS: Status: RESOLVED | Noted: 2020-06-19 | Resolved: 2023-08-01

## 2023-08-01 PROCEDURE — G8400 PT W/DXA NO RESULTS DOC: HCPCS | Performed by: NURSE PRACTITIONER

## 2023-08-01 PROCEDURE — G8420 CALC BMI NORM PARAMETERS: HCPCS | Performed by: NURSE PRACTITIONER

## 2023-08-01 PROCEDURE — 3074F SYST BP LT 130 MM HG: CPT | Performed by: NURSE PRACTITIONER

## 2023-08-01 PROCEDURE — 3078F DIAST BP <80 MM HG: CPT | Performed by: NURSE PRACTITIONER

## 2023-08-01 PROCEDURE — 1123F ACP DISCUSS/DSCN MKR DOCD: CPT | Performed by: NURSE PRACTITIONER

## 2023-08-01 PROCEDURE — G8427 DOCREV CUR MEDS BY ELIG CLIN: HCPCS | Performed by: NURSE PRACTITIONER

## 2023-08-01 PROCEDURE — 99214 OFFICE O/P EST MOD 30 MIN: CPT | Performed by: NURSE PRACTITIONER

## 2023-08-01 PROCEDURE — 1036F TOBACCO NON-USER: CPT | Performed by: NURSE PRACTITIONER

## 2023-08-01 PROCEDURE — 1090F PRES/ABSN URINE INCON ASSESS: CPT | Performed by: NURSE PRACTITIONER

## 2023-08-01 RX ORDER — MIRTAZAPINE 7.5 MG/1
7.5 TABLET, FILM COATED ORAL NIGHTLY
COMMUNITY

## 2023-08-01 RX ORDER — DONEPEZIL HYDROCHLORIDE 5 MG/1
5 TABLET, FILM COATED ORAL NIGHTLY
COMMUNITY

## 2023-08-01 RX ORDER — MEMANTINE HYDROCHLORIDE 10 MG/1
10 TABLET ORAL 2 TIMES DAILY
COMMUNITY
Start: 2023-07-25

## 2023-08-01 RX ORDER — LISINOPRIL 5 MG/1
5 TABLET ORAL DAILY
COMMUNITY

## 2023-08-01 ASSESSMENT — PATIENT HEALTH QUESTIONNAIRE - PHQ9
SUM OF ALL RESPONSES TO PHQ9 QUESTIONS 1 & 2: 0
SUM OF ALL RESPONSES TO PHQ QUESTIONS 1-9: 0
1. LITTLE INTEREST OR PLEASURE IN DOING THINGS: 0
SUM OF ALL RESPONSES TO PHQ QUESTIONS 1-9: 0
2. FEELING DOWN, DEPRESSED OR HOPELESS: 0

## 2023-08-01 NOTE — PROGRESS NOTES
Sister 78        pancreatic    Hypertension Father        Social History     Socioeconomic History    Marital status:    Tobacco Use    Smoking status: Never    Smokeless tobacco: Never   Vaping Use    Vaping Use: Never used   Substance and Sexual Activity    Alcohol use: Never    Drug use: Never    Sexual activity: Not Currently     Social Determinants of Health     Physical Activity: Sufficiently Active    Days of Exercise per Week: 5 days    Minutes of Exercise per Session: 40 min         Current Outpatient Medications:     furosemide (LASIX) 40 MG tablet, Take 1 tablet by mouth daily Take for 2 days and reassess the edema in her feet. If not improved, take for 3 additional days. , Disp: 5 tablet, Rfl: 3    potassium chloride (KLOR-CON M) 20 MEQ extended release tablet, Take 1 tablet by mouth 2 times daily Take 2 days with lasix and then reassess. If swelling continues, take 3 more days of lasix with potassium. , Disp: 4 tablet, Rfl: 3    Cholecalciferol (VITAMIN D) 50 MCG (2000 UT) CAPS capsule, Take by mouth, Disp: , Rfl:     atorvastatin (LIPITOR) 80 MG tablet, Take 1 tablet by mouth nightly, Disp: 30 tablet, Rfl: 3    lisinopril (PRINIVIL;ZESTRIL) 20 MG tablet, TAKE 1 TABLET EVERY DAY, Disp: 90 tablet, Rfl: 3    Levocetirizine Dihydrochloride (XYZAL ALLERGY 24HR PO), Take by mouth daily (Patient not taking: Reported on 5/26/2023), Disp: , Rfl:     fluticasone (FLONASE) 50 MCG/ACT nasal spray, 1 spray by Each Nostril route daily, Disp: , Rfl:     Multiple Vitamins-Minerals (MULTIVITAMIN ADULTS PO), Take by mouth daily, Disp: , Rfl:     verapamil (VERELAN) 240 MG extended release capsule, Take 1 capsule by mouth nightly 1 every day, Disp: 90 capsule, Rfl: 3    rivaroxaban (XARELTO) 20 MG TABS tablet, Take 1 tablet by mouth daily (with breakfast), Disp: 90 tablet, Rfl: 3    donepezil (ARICEPT) 10 MG tablet, Take 1 tablet by mouth nightly, Disp: 90 tablet, Rfl: 3    QUEtiapine (SEROQUEL) 25 MG tablet, Take 1

## 2023-08-02 ASSESSMENT — ENCOUNTER SYMPTOMS
ALLERGIC/IMMUNOLOGIC NEGATIVE: 1
GASTROINTESTINAL NEGATIVE: 1
RESPIRATORY NEGATIVE: 1
EYES NEGATIVE: 1

## 2023-12-15 ENCOUNTER — OFFICE VISIT (OUTPATIENT)
Age: 83
End: 2023-12-15
Payer: MEDICARE

## 2023-12-15 VITALS
WEIGHT: 91 LBS | SYSTOLIC BLOOD PRESSURE: 136 MMHG | DIASTOLIC BLOOD PRESSURE: 80 MMHG | BODY MASS INDEX: 19.1 KG/M2 | HEIGHT: 58 IN | HEART RATE: 64 BPM

## 2023-12-15 DIAGNOSIS — G30.9 ALZHEIMER'S DISEASE, UNSPECIFIED (CODE) (HCC): Primary | ICD-10-CM

## 2023-12-15 DIAGNOSIS — I10 PRIMARY HYPERTENSION: Chronic | ICD-10-CM

## 2023-12-15 DIAGNOSIS — E78.2 MIXED HYPERLIPIDEMIA: ICD-10-CM

## 2023-12-15 DIAGNOSIS — I48.0 PAROXYSMAL ATRIAL FIBRILLATION (HCC): ICD-10-CM

## 2023-12-15 PROCEDURE — 1036F TOBACCO NON-USER: CPT | Performed by: INTERNAL MEDICINE

## 2023-12-15 PROCEDURE — 99214 OFFICE O/P EST MOD 30 MIN: CPT | Performed by: INTERNAL MEDICINE

## 2023-12-15 PROCEDURE — G8400 PT W/DXA NO RESULTS DOC: HCPCS | Performed by: INTERNAL MEDICINE

## 2023-12-15 PROCEDURE — 1123F ACP DISCUSS/DSCN MKR DOCD: CPT | Performed by: INTERNAL MEDICINE

## 2023-12-15 PROCEDURE — G8427 DOCREV CUR MEDS BY ELIG CLIN: HCPCS | Performed by: INTERNAL MEDICINE

## 2023-12-15 PROCEDURE — G8484 FLU IMMUNIZE NO ADMIN: HCPCS | Performed by: INTERNAL MEDICINE

## 2023-12-15 PROCEDURE — 3079F DIAST BP 80-89 MM HG: CPT | Performed by: INTERNAL MEDICINE

## 2023-12-15 PROCEDURE — G8420 CALC BMI NORM PARAMETERS: HCPCS | Performed by: INTERNAL MEDICINE

## 2023-12-15 PROCEDURE — 1090F PRES/ABSN URINE INCON ASSESS: CPT | Performed by: INTERNAL MEDICINE

## 2023-12-15 PROCEDURE — 3075F SYST BP GE 130 - 139MM HG: CPT | Performed by: INTERNAL MEDICINE

## 2023-12-15 NOTE — PROGRESS NOTES
[address] [place]  [address] [facility], [facility], [address]  PHONE: [address] [facility] [place] Stanley  1940      SUBJECTIVE:   Elizabeth Gonzalez is a 80 y.o. female seen for a follow up visit regarding the following:     Chief Complaint   Patient presents with    Atrial Fibrillation    Hypertension       HPI:    Patient presents for follow-up. She has no complaints. Appears that her memory impairment/dementia is progressing. Her  states her memory has been extremely poor. They are now living in an assisted living facility. She denies any palpitations or tachycardia. No pain. No chest pain. Has a very flat affect. Blood pressure appears to be controlled. Blood in her stool or melena. Compliant with Xarelto. Past Medical History, Past Surgical History, Family history, Social History, and Medications were all reviewed with the patient today and updated as necessary.            Current Outpatient Medications:     memantine (NAMENDA) 10 MG tablet, Take 1 tablet by mouth 2 times daily, Disp: , Rfl:     donepezil (ARICEPT) 5 MG tablet, Take 1 tablet by mouth nightly, Disp: , Rfl:     lisinopril (PRINIVIL;ZESTRIL) 5 MG tablet, Take 1 tablet by mouth daily, Disp: , Rfl:     mirtazapine (REMERON) 7.5 MG tablet, Take 1 tablet by mouth nightly, Disp: , Rfl:     Cholecalciferol (VITAMIN D) 50 MCG (2000 UT) CAPS capsule, Take by mouth, Disp: , Rfl:     atorvastatin (LIPITOR) 80 MG tablet, Take 1 tablet by mouth nightly, Disp: 30 tablet, Rfl: 3    Multiple Vitamins-Minerals (MULTIVITAMIN ADULTS PO), Take by mouth daily, Disp: , Rfl:     verapamil (VERELAN) 240 MG extended release capsule, Take 1 capsule by mouth nightly 1 every day, Disp: 90 capsule, Rfl: 3    rivaroxaban (XARELTO) 20 MG TABS tablet, Take 1 tablet by mouth daily (with breakfast), Disp: 90 tablet, Rfl: 3    QUEtiapine (SEROQUEL) 25 MG tablet, Take 1 tablet by mouth at bedtime, Disp: 90 tablet, Rfl: 1

## 2023-12-27 RX ORDER — LEVOFLOXACIN 500 MG/1
500 TABLET, FILM COATED ORAL DAILY
Qty: 10 TABLET | Refills: 0 | Status: SHIPPED | OUTPATIENT
Start: 2023-12-27 | End: 2024-01-06

## 2024-05-30 NOTE — TELEPHONE ENCOUNTER
Prescription sent to pharmacy on file//aleisha  Requested Prescriptions     Signed Prescriptions Disp Refills    rivaroxaban (XARELTO) 20 MG TABS tablet 90 tablet 3     Sig: Take 1 tablet by mouth daily (with breakfast)     Authorizing Provider: KARYN VILLAR     Ordering User: MINDY GATES

## 2024-06-19 ENCOUNTER — OFFICE VISIT (OUTPATIENT)
Age: 84
End: 2024-06-19
Payer: MEDICARE

## 2024-06-19 VITALS
DIASTOLIC BLOOD PRESSURE: 82 MMHG | BODY MASS INDEX: 19.1 KG/M2 | HEART RATE: 79 BPM | WEIGHT: 91 LBS | SYSTOLIC BLOOD PRESSURE: 120 MMHG | HEIGHT: 58 IN

## 2024-06-19 DIAGNOSIS — G30.9 ALZHEIMER'S DISEASE, UNSPECIFIED (CODE) (HCC): ICD-10-CM

## 2024-06-19 DIAGNOSIS — I48.0 PAROXYSMAL ATRIAL FIBRILLATION (HCC): Primary | ICD-10-CM

## 2024-06-19 DIAGNOSIS — E78.2 MIXED HYPERLIPIDEMIA: ICD-10-CM

## 2024-06-19 DIAGNOSIS — I10 PRIMARY HYPERTENSION: Chronic | ICD-10-CM

## 2024-06-19 PROCEDURE — 93000 ELECTROCARDIOGRAM COMPLETE: CPT | Performed by: INTERNAL MEDICINE

## 2024-06-19 PROCEDURE — 1036F TOBACCO NON-USER: CPT | Performed by: INTERNAL MEDICINE

## 2024-06-19 PROCEDURE — 99214 OFFICE O/P EST MOD 30 MIN: CPT | Performed by: INTERNAL MEDICINE

## 2024-06-19 PROCEDURE — G8400 PT W/DXA NO RESULTS DOC: HCPCS | Performed by: INTERNAL MEDICINE

## 2024-06-19 PROCEDURE — G8427 DOCREV CUR MEDS BY ELIG CLIN: HCPCS | Performed by: INTERNAL MEDICINE

## 2024-06-19 PROCEDURE — 1090F PRES/ABSN URINE INCON ASSESS: CPT | Performed by: INTERNAL MEDICINE

## 2024-06-19 PROCEDURE — 1123F ACP DISCUSS/DSCN MKR DOCD: CPT | Performed by: INTERNAL MEDICINE

## 2024-06-19 PROCEDURE — 3074F SYST BP LT 130 MM HG: CPT | Performed by: INTERNAL MEDICINE

## 2024-06-19 PROCEDURE — G8420 CALC BMI NORM PARAMETERS: HCPCS | Performed by: INTERNAL MEDICINE

## 2024-06-19 PROCEDURE — 3079F DIAST BP 80-89 MM HG: CPT | Performed by: INTERNAL MEDICINE

## 2024-06-19 ASSESSMENT — ENCOUNTER SYMPTOMS: SHORTNESS OF BREATH: 0

## 2024-06-19 NOTE — PROGRESS NOTES
[facility] CARDIOLOGY, [address], [address]  [place] [address]  PHONE: [phone]    Seema Sivla  1940      SUBJECTIVE:   Seema Silva is a 83 y.o. female seen for a follow up visit regarding the following:     Chief Complaint   Patient presents with    Atrial Fibrillation    Other     Questions about Xarelto       HPI:    Patient presents for follow-up.  She has had progression of her Alzheimer's dementia.  She has had worsening debility and felt to be very high risk for falls.  Family rightfully concerned about ongoing anticoagulation given her frailty.        Atrial Fibrillation CHADSVASC2 Score Stroke Risk:   83 y.o. > 75 - 2    female Female - 1   CHF HX: No - 0   HTN HX: Yes - 1   Stroke/TIA/Thromboembolism No - 0   Vascular Disease HX: No - 0   Diabetes Mellitus No - 0   CHADSVASC 2 Score 4      Annual Stroke Risk 4.8%- moderate-high          Past Medical History, Past Surgical History, Family history, Social History, and Medications were all reviewed with the patient today and updated as necessary.           Current Outpatient Medications:     memantine (NAMENDA) 10 MG tablet, Take 1 tablet by mouth 2 times daily, Disp: , Rfl:     lisinopril (PRINIVIL;ZESTRIL) 5 MG tablet, Take 1 tablet by mouth daily, Disp: , Rfl:     mirtazapine (REMERON) 7.5 MG tablet, Take 1 tablet by mouth nightly, Disp: , Rfl:     Cholecalciferol (VITAMIN D) 50 MCG (2000 UT) CAPS capsule, Take by mouth, Disp: , Rfl:     atorvastatin (LIPITOR) 80 MG tablet, Take 1 tablet by mouth nightly, Disp: 30 tablet, Rfl: 3    Multiple Vitamins-Minerals (MULTIVITAMIN ADULTS PO), Take by mouth daily, Disp: , Rfl:     verapamil (VERELAN) 240 MG extended release capsule, Take 1 capsule by mouth nightly 1 every day, Disp: 90 capsule, Rfl: 3    QUEtiapine (SEROQUEL) 25 MG tablet, Take 1 tablet by mouth at bedtime, Disp: 90 tablet, Rfl: 1    donepezil (ARICEPT) 5 MG tablet, Take 1 tablet by mouth nightly

## 2024-07-24 ENCOUNTER — OFFICE VISIT (OUTPATIENT)
Dept: NEUROLOGY | Age: 84
End: 2024-07-24
Payer: MEDICARE

## 2024-07-24 DIAGNOSIS — F02.80 ALZHEIMER DISEASE (HCC): Primary | ICD-10-CM

## 2024-07-24 DIAGNOSIS — G30.9 ALZHEIMER DISEASE (HCC): Primary | ICD-10-CM

## 2024-07-24 PROCEDURE — 1090F PRES/ABSN URINE INCON ASSESS: CPT | Performed by: NURSE PRACTITIONER

## 2024-07-24 PROCEDURE — 1036F TOBACCO NON-USER: CPT | Performed by: NURSE PRACTITIONER

## 2024-07-24 PROCEDURE — 1123F ACP DISCUSS/DSCN MKR DOCD: CPT | Performed by: NURSE PRACTITIONER

## 2024-07-24 PROCEDURE — G8427 DOCREV CUR MEDS BY ELIG CLIN: HCPCS | Performed by: NURSE PRACTITIONER

## 2024-07-24 PROCEDURE — G8400 PT W/DXA NO RESULTS DOC: HCPCS | Performed by: NURSE PRACTITIONER

## 2024-07-24 PROCEDURE — 3074F SYST BP LT 130 MM HG: CPT | Performed by: NURSE PRACTITIONER

## 2024-07-24 PROCEDURE — 99214 OFFICE O/P EST MOD 30 MIN: CPT | Performed by: NURSE PRACTITIONER

## 2024-07-24 PROCEDURE — G8419 CALC BMI OUT NRM PARAM NOF/U: HCPCS | Performed by: NURSE PRACTITIONER

## 2024-07-24 PROCEDURE — 3078F DIAST BP <80 MM HG: CPT | Performed by: NURSE PRACTITIONER

## 2024-07-24 RX ORDER — QUETIAPINE FUMARATE 25 MG/1
25 TABLET, FILM COATED ORAL NIGHTLY
Qty: 90 TABLET | Refills: 3 | Status: SHIPPED | OUTPATIENT
Start: 2024-07-24

## 2024-07-24 RX ORDER — MEMANTINE HYDROCHLORIDE 10 MG/1
10 TABLET ORAL 2 TIMES DAILY
Qty: 180 TABLET | Refills: 1 | Status: CANCELLED | OUTPATIENT
Start: 2024-07-24

## 2024-07-24 RX ORDER — MIRTAZAPINE 7.5 MG/1
7.5 TABLET, FILM COATED ORAL NIGHTLY
Qty: 90 TABLET | Refills: 1 | Status: SHIPPED | OUTPATIENT
Start: 2024-07-24

## 2024-07-24 RX ORDER — MIRTAZAPINE 7.5 MG/1
7.5 TABLET, FILM COATED ORAL NIGHTLY
Qty: 90 TABLET | Refills: 1 | Status: CANCELLED | OUTPATIENT
Start: 2024-07-24

## 2024-07-24 RX ORDER — QUETIAPINE FUMARATE 25 MG/1
25 TABLET, FILM COATED ORAL NIGHTLY
Qty: 90 TABLET | Refills: 1 | Status: CANCELLED | OUTPATIENT
Start: 2024-07-24

## 2024-07-24 RX ORDER — VERAPAMIL HYDROCHLORIDE 240 MG/1
240 TABLET, FILM COATED, EXTENDED RELEASE ORAL NIGHTLY
COMMUNITY
Start: 2024-05-21

## 2024-07-24 RX ORDER — MEMANTINE HYDROCHLORIDE 10 MG/1
10 TABLET ORAL 2 TIMES DAILY
Qty: 180 TABLET | Refills: 3 | Status: SHIPPED | OUTPATIENT
Start: 2024-07-24

## 2024-07-24 ASSESSMENT — ENCOUNTER SYMPTOMS
ALLERGIC/IMMUNOLOGIC NEGATIVE: 1
EYES NEGATIVE: 1
GASTROINTESTINAL NEGATIVE: 1
RESPIRATORY NEGATIVE: 1

## 2024-07-25 VITALS
RESPIRATION RATE: 16 BRPM | DIASTOLIC BLOOD PRESSURE: 64 MMHG | OXYGEN SATURATION: 97 % | BODY MASS INDEX: 18.47 KG/M2 | HEART RATE: 85 BPM | WEIGHT: 88 LBS | HEIGHT: 58 IN | SYSTOLIC BLOOD PRESSURE: 95 MMHG

## 2024-07-31 ENCOUNTER — TELEPHONE (OUTPATIENT)
Dept: NEUROLOGY | Age: 84
End: 2024-07-31

## 2024-08-20 NOTE — ED TRIAGE NOTES
Pt w/c to triage with c/o left side facial droop since waking at mary 1200, fatigue, inability to stand or walk. Pt reports history of a-fib and is taking Xarelto. Pt denies history of stroke. MD notified of pt condition.
PROVIDER:[TOKEN:[[id number]],FOLLOWUP:[7-10 Days]]

## 2024-10-12 RX ORDER — LEVOFLOXACIN 500 MG/1
500 TABLET, FILM COATED ORAL DAILY
Qty: 7 TABLET | Refills: 0 | Status: SHIPPED | OUTPATIENT
Start: 2024-10-12 | End: 2024-10-19

## 2024-10-18 ENCOUNTER — HOSPITAL ENCOUNTER (EMERGENCY)
Age: 84
Discharge: NURSING, REHAB OR LONG-TERM CARE | End: 2024-10-19
Attending: EMERGENCY MEDICINE
Payer: MEDICARE

## 2024-10-18 ENCOUNTER — APPOINTMENT (OUTPATIENT)
Dept: CT IMAGING | Age: 84
End: 2024-10-18
Payer: MEDICARE

## 2024-10-18 ENCOUNTER — APPOINTMENT (OUTPATIENT)
Dept: GENERAL RADIOLOGY | Age: 84
End: 2024-10-18
Payer: MEDICARE

## 2024-10-18 DIAGNOSIS — R53.1 GENERAL WEAKNESS: ICD-10-CM

## 2024-10-18 DIAGNOSIS — F03.B0 MODERATE DEMENTIA WITHOUT BEHAVIORAL DISTURBANCE, PSYCHOTIC DISTURBANCE, MOOD DISTURBANCE, OR ANXIETY, UNSPECIFIED DEMENTIA TYPE (HCC): Primary | ICD-10-CM

## 2024-10-18 LAB
ALBUMIN SERPL-MCNC: 2.9 G/DL (ref 3.2–4.6)
ALBUMIN/GLOB SERPL: 0.9 (ref 1–1.9)
ALP SERPL-CCNC: 115 U/L (ref 35–104)
ALT SERPL-CCNC: 17 U/L (ref 8–45)
ANION GAP SERPL CALC-SCNC: 14 MMOL/L (ref 9–18)
APPEARANCE UR: CLEAR
AST SERPL-CCNC: 37 U/L (ref 15–37)
BACTERIA URNS QL MICRO: NEGATIVE /HPF
BASOPHILS # BLD: 0 K/UL (ref 0–0.2)
BASOPHILS NFR BLD: 0 % (ref 0–2)
BILIRUB SERPL-MCNC: 0.5 MG/DL (ref 0–1.2)
BILIRUB UR QL: NEGATIVE
BUN SERPL-MCNC: 22 MG/DL (ref 8–23)
CALCIUM SERPL-MCNC: 9.1 MG/DL (ref 8.8–10.2)
CASTS URNS QL MICRO: 0 /LPF
CHLORIDE SERPL-SCNC: 106 MMOL/L (ref 98–107)
CO2 SERPL-SCNC: 21 MMOL/L (ref 20–28)
COLOR UR: ABNORMAL
CREAT SERPL-MCNC: 0.9 MG/DL (ref 0.6–1.1)
CRYSTALS URNS QL MICRO: 0 /LPF
DIFFERENTIAL METHOD BLD: ABNORMAL
EOSINOPHIL # BLD: 0.1 K/UL (ref 0–0.8)
EOSINOPHIL NFR BLD: 0 % (ref 0.5–7.8)
EPI CELLS #/AREA URNS HPF: ABNORMAL /HPF
ERYTHROCYTE [DISTWIDTH] IN BLOOD BY AUTOMATED COUNT: 13.9 % (ref 11.9–14.6)
GLOBULIN SER CALC-MCNC: 3.2 G/DL (ref 2.3–3.5)
GLUCOSE SERPL-MCNC: 111 MG/DL (ref 70–99)
GLUCOSE UR STRIP.AUTO-MCNC: NEGATIVE MG/DL
HCT VFR BLD AUTO: 33.6 % (ref 35.8–46.3)
HGB BLD-MCNC: 11.1 G/DL (ref 11.7–15.4)
HGB UR QL STRIP: NEGATIVE
HYALINE CASTS URNS QL MICRO: ABNORMAL /LPF
IMM GRANULOCYTES # BLD AUTO: 0.1 K/UL (ref 0–0.5)
IMM GRANULOCYTES NFR BLD AUTO: 1 % (ref 0–5)
KETONES UR QL STRIP.AUTO: NEGATIVE MG/DL
LACTATE SERPL-SCNC: 0.9 MMOL/L (ref 0.5–2)
LEUKOCYTE ESTERASE UR QL STRIP.AUTO: NEGATIVE
LIPASE SERPL-CCNC: 74 U/L (ref 13–60)
LYMPHOCYTES # BLD: 1.3 K/UL (ref 0.5–4.6)
LYMPHOCYTES NFR BLD: 11 % (ref 13–44)
MCH RBC QN AUTO: 32 PG (ref 26.1–32.9)
MCHC RBC AUTO-ENTMCNC: 33 G/DL (ref 31.4–35)
MCV RBC AUTO: 96.8 FL (ref 82–102)
MONOCYTES # BLD: 1.3 K/UL (ref 0.1–1.3)
MONOCYTES NFR BLD: 11 % (ref 4–12)
MUCOUS THREADS URNS QL MICRO: 0 /LPF
NEUTS SEG # BLD: 9.1 K/UL (ref 1.7–8.2)
NEUTS SEG NFR BLD: 77 % (ref 43–78)
NITRITE UR QL STRIP.AUTO: NEGATIVE
NRBC # BLD: 0 K/UL (ref 0–0.2)
PH UR STRIP: 6.5 (ref 5–9)
PLATELET # BLD AUTO: 222 K/UL (ref 150–450)
PMV BLD AUTO: 11 FL (ref 9.4–12.3)
POTASSIUM SERPL-SCNC: 3.5 MMOL/L (ref 3.5–5.1)
PROCALCITONIN SERPL-MCNC: 0.11 NG/ML (ref 0–0.1)
PROT SERPL-MCNC: 6.2 G/DL (ref 6.3–8.2)
PROT UR STRIP-MCNC: 30 MG/DL
RBC # BLD AUTO: 3.47 M/UL (ref 4.05–5.2)
RBC #/AREA URNS HPF: ABNORMAL /HPF
SODIUM SERPL-SCNC: 141 MMOL/L (ref 136–145)
SP GR UR REFRACTOMETRY: 1.02 (ref 1–1.02)
TROPONIN T SERPL HS-MCNC: 28 NG/L (ref 0–14)
URINE CULTURE IF INDICATED: ABNORMAL
UROBILINOGEN UR QL STRIP.AUTO: 1 EU/DL (ref 0.2–1)
WBC # BLD AUTO: 11.8 K/UL (ref 4.3–11.1)
WBC URNS QL MICRO: ABNORMAL /HPF

## 2024-10-18 PROCEDURE — 83605 ASSAY OF LACTIC ACID: CPT

## 2024-10-18 PROCEDURE — 84484 ASSAY OF TROPONIN QUANT: CPT

## 2024-10-18 PROCEDURE — 93005 ELECTROCARDIOGRAM TRACING: CPT | Performed by: EMERGENCY MEDICINE

## 2024-10-18 PROCEDURE — 87635 SARS-COV-2 COVID-19 AMP PRB: CPT

## 2024-10-18 PROCEDURE — 84145 PROCALCITONIN (PCT): CPT

## 2024-10-18 PROCEDURE — 80053 COMPREHEN METABOLIC PANEL: CPT

## 2024-10-18 PROCEDURE — 2580000003 HC RX 258: Performed by: EMERGENCY MEDICINE

## 2024-10-18 PROCEDURE — 70450 CT HEAD/BRAIN W/O DYE: CPT

## 2024-10-18 PROCEDURE — 85025 COMPLETE CBC W/AUTO DIFF WBC: CPT

## 2024-10-18 PROCEDURE — 99285 EMERGENCY DEPT VISIT HI MDM: CPT

## 2024-10-18 PROCEDURE — 87040 BLOOD CULTURE FOR BACTERIA: CPT

## 2024-10-18 PROCEDURE — 87502 INFLUENZA DNA AMP PROBE: CPT

## 2024-10-18 PROCEDURE — 71045 X-RAY EXAM CHEST 1 VIEW: CPT

## 2024-10-18 PROCEDURE — 87634 RSV DNA/RNA AMP PROBE: CPT

## 2024-10-18 PROCEDURE — 83690 ASSAY OF LIPASE: CPT

## 2024-10-18 PROCEDURE — 81001 URINALYSIS AUTO W/SCOPE: CPT

## 2024-10-18 RX ORDER — 0.9 % SODIUM CHLORIDE 0.9 %
1000 INTRAVENOUS SOLUTION INTRAVENOUS
Status: COMPLETED | OUTPATIENT
Start: 2024-10-18 | End: 2024-10-19

## 2024-10-18 RX ADMIN — SODIUM CHLORIDE 1000 ML: 9 INJECTION, SOLUTION INTRAVENOUS at 23:19

## 2024-10-18 ASSESSMENT — PAIN - FUNCTIONAL ASSESSMENT: PAIN_FUNCTIONAL_ASSESSMENT: NONE - DENIES PAIN

## 2024-10-19 VITALS
HEART RATE: 67 BPM | WEIGHT: 84.44 LBS | HEIGHT: 58 IN | TEMPERATURE: 98.5 F | RESPIRATION RATE: 13 BRPM | OXYGEN SATURATION: 96 % | SYSTOLIC BLOOD PRESSURE: 91 MMHG | BODY MASS INDEX: 17.72 KG/M2 | DIASTOLIC BLOOD PRESSURE: 60 MMHG

## 2024-10-19 LAB
EKG ATRIAL RATE: 76 BPM
EKG DIAGNOSIS: NORMAL
EKG P AXIS: 0 DEGREES
EKG P-R INTERVAL: 182 MS
EKG Q-T INTERVAL: 605 MS
EKG QRS DURATION: 76 MS
EKG QTC CALCULATION (BAZETT): 681 MS
EKG R AXIS: 59 DEGREES
EKG T AXIS: 259 DEGREES
EKG VENTRICULAR RATE: 76 BPM
FLUAV RNA SPEC QL NAA+PROBE: NOT DETECTED
FLUBV RNA SPEC QL NAA+PROBE: NOT DETECTED
RSV RNA NPH QL NAA+PROBE: NOT DETECTED
SARS-COV-2 RDRP RESP QL NAA+PROBE: NOT DETECTED
SOURCE: NORMAL
SOURCE: NORMAL

## 2024-10-19 PROCEDURE — 93010 ELECTROCARDIOGRAM REPORT: CPT | Performed by: INTERNAL MEDICINE

## 2024-10-19 NOTE — ED TRIAGE NOTES
Pt from [facility] [address] [place] [address] room 105    Pt for facility for having acute onset of weakness and altered loc for the past few hours per  who is her primary care taker

## 2024-10-19 NOTE — DISCHARGE INSTRUCTIONS
Family will obtain home health and physical therapy at the assisted living facility. Call back to ED if you need assistance setting that up during the week.

## 2024-10-19 NOTE — ED PROVIDER NOTES
Emergency Department Provider Note       PCP: None, None   Age: 83 y.o.   Sex: female     DISPOSITION Decision To Discharge 10/19/2024 12:24:42 AM  Condition at Disposition: Data Unavailable       ICD-10-CM    1. Moderate dementia without behavioral disturbance, psychotic disturbance, mood disturbance, or anxiety, unspecified dementia type (HCC)  F03.B0       2. General weakness  R53.1           Medical Decision Making     Patient with dementia and generalized weakness.  Progressively worsening over the past month.  Patient and  live in assisted living facility.  He has been using a wheelchair to get her around recently.  Had some increased weakness transitioning to the bed this evening.  Brought in by EMS.  There is no acute on lab work urinalysis or viral swabs.  No signs of infection.  Family states they will be able to get home health and physical therapy involved.  Will discharge patient back to facility with return if needed.     1 or more acute illnesses that pose a threat to life or bodily function.   Patient was discharged risks and benefits of hospitalization were considered.  Shared medical decision making was utilized in creating the patients health plan today.  I independently ordered and reviewed each unique test.    I reviewed external records: provider visit note from outside specialist.   The patients assessment required an independent historian: .  The reason they were needed is important historical information not provided by the patient.  ED cardiac monitoring rhythm strip was ordered and interpreted:  sinus rhythm, no evidence of arrhythmia  ST Segments:Nonspecific ST segments - NO STEMI   Rate: 76  I interpreted the X-rays no infiltrate.  I interpreted the CT Scan no hemorrhage.  ED provider's independent EKG interpretation normal sinus rhythm rate 76.  Nonspecific ST changes.            History     Patient with dementia and paroxysmal atrial fibrillation.  EMS was called out

## 2024-10-20 LAB
BACTERIA SPEC CULT: NORMAL
SERVICE CMNT-IMP: NORMAL

## 2024-10-23 LAB
BACTERIA SPEC CULT: NORMAL
SERVICE CMNT-IMP: NORMAL

## 2024-11-01 ENCOUNTER — TELEPHONE (OUTPATIENT)
Age: 84
End: 2024-11-01

## 2024-11-01 NOTE — TELEPHONE ENCOUNTER
Please cancel mom’s appointment with Dr Chaney in Dec. she passed away on Sunday. Please thank him for the years of care he provided her. He always made her feel very special. I am forever grateful.  Nahed Castellanos